# Patient Record
Sex: MALE | Race: WHITE | Employment: OTHER | URBAN - METROPOLITAN AREA
[De-identification: names, ages, dates, MRNs, and addresses within clinical notes are randomized per-mention and may not be internally consistent; named-entity substitution may affect disease eponyms.]

---

## 2020-04-21 ENCOUNTER — TRANSFERRED RECORDS (OUTPATIENT)
Dept: HEALTH INFORMATION MANAGEMENT | Facility: CLINIC | Age: 70
End: 2020-04-21

## 2020-04-21 LAB — EJECTION FRACTION: 10 %

## 2020-04-28 ENCOUNTER — TRANSFERRED RECORDS (OUTPATIENT)
Dept: HEALTH INFORMATION MANAGEMENT | Facility: CLINIC | Age: 70
End: 2020-04-28

## 2020-05-05 ENCOUNTER — HOSPITAL ENCOUNTER (INPATIENT)
Facility: CLINIC | Age: 70
LOS: 14 days | Discharge: HOME HEALTH | DRG: 234 | End: 2020-05-19
Attending: THORACIC SURGERY (CARDIOTHORACIC VASCULAR SURGERY) | Admitting: INTERNAL MEDICINE
Payer: MEDICARE

## 2020-05-05 DIAGNOSIS — I25.10 CAD (CORONARY ARTERY DISEASE): ICD-10-CM

## 2020-05-05 DIAGNOSIS — I25.10 CORONARY ARTERY DISEASE INVOLVING NATIVE CORONARY ARTERY OF NATIVE HEART WITHOUT ANGINA PECTORIS: ICD-10-CM

## 2020-05-05 DIAGNOSIS — Z95.1 S/P THREE VESSEL CORONARY ARTERY BYPASS: Primary | ICD-10-CM

## 2020-05-05 DIAGNOSIS — I25.10 3-VESSEL CORONARY ARTERY DISEASE: ICD-10-CM

## 2020-05-05 LAB
ALBUMIN SERPL-MCNC: 2.9 G/DL (ref 3.4–5)
ALP SERPL-CCNC: 40 U/L (ref 40–150)
ALT SERPL W P-5'-P-CCNC: 27 U/L (ref 0–70)
ANION GAP SERPL CALCULATED.3IONS-SCNC: 5 MMOL/L (ref 3–14)
AST SERPL W P-5'-P-CCNC: 19 U/L (ref 0–45)
BASOPHILS # BLD AUTO: 0 10E9/L (ref 0–0.2)
BASOPHILS NFR BLD AUTO: 0.3 %
BILIRUB SERPL-MCNC: 0.6 MG/DL (ref 0.2–1.3)
BUN SERPL-MCNC: 15 MG/DL (ref 7–30)
CALCIUM SERPL-MCNC: 9.3 MG/DL (ref 8.5–10.1)
CHLORIDE SERPL-SCNC: 102 MMOL/L (ref 94–109)
CO2 SERPL-SCNC: 31 MMOL/L (ref 20–32)
CREAT SERPL-MCNC: 0.82 MG/DL (ref 0.66–1.25)
DIFFERENTIAL METHOD BLD: ABNORMAL
EOSINOPHIL # BLD AUTO: 0.8 10E9/L (ref 0–0.7)
EOSINOPHIL NFR BLD AUTO: 8.5 %
ERYTHROCYTE [DISTWIDTH] IN BLOOD BY AUTOMATED COUNT: 14.2 % (ref 10–15)
ERYTHROCYTE [DISTWIDTH] IN BLOOD BY AUTOMATED COUNT: 14.3 % (ref 10–15)
GFR SERPL CREATININE-BSD FRML MDRD: >90 ML/MIN/{1.73_M2}
GLUCOSE SERPL-MCNC: 121 MG/DL (ref 70–99)
HBA1C MFR BLD: 5.8 % (ref 0–5.6)
HCT VFR BLD AUTO: 42 % (ref 40–53)
HCT VFR BLD AUTO: 43.4 % (ref 40–53)
HGB BLD-MCNC: 12.8 G/DL (ref 13.3–17.7)
HGB BLD-MCNC: 12.8 G/DL (ref 13.3–17.7)
IMM GRANULOCYTES # BLD: 0 10E9/L (ref 0–0.4)
IMM GRANULOCYTES NFR BLD: 0.4 %
LYMPHOCYTES # BLD AUTO: 1.5 10E9/L (ref 0.8–5.3)
LYMPHOCYTES NFR BLD AUTO: 15.7 %
MAGNESIUM SERPL-MCNC: 1.9 MG/DL (ref 1.6–2.3)
MCH RBC QN AUTO: 28.5 PG (ref 26.5–33)
MCH RBC QN AUTO: 29.1 PG (ref 26.5–33)
MCHC RBC AUTO-ENTMCNC: 29.5 G/DL (ref 31.5–36.5)
MCHC RBC AUTO-ENTMCNC: 30.5 G/DL (ref 31.5–36.5)
MCV RBC AUTO: 96 FL (ref 78–100)
MCV RBC AUTO: 97 FL (ref 78–100)
MONOCYTES # BLD AUTO: 1 10E9/L (ref 0–1.3)
MONOCYTES NFR BLD AUTO: 10.6 %
NEUTROPHILS # BLD AUTO: 6.2 10E9/L (ref 1.6–8.3)
NEUTROPHILS NFR BLD AUTO: 64.5 %
NRBC # BLD AUTO: 0 10*3/UL
NRBC BLD AUTO-RTO: 0 /100
PLATELET # BLD AUTO: 244 10E9/L (ref 150–450)
PLATELET # BLD AUTO: 247 10E9/L (ref 150–450)
POTASSIUM SERPL-SCNC: 4.8 MMOL/L (ref 3.4–5.3)
PROT SERPL-MCNC: 7.2 G/DL (ref 6.8–8.8)
RBC # BLD AUTO: 4.4 10E12/L (ref 4.4–5.9)
RBC # BLD AUTO: 4.49 10E12/L (ref 4.4–5.9)
SODIUM SERPL-SCNC: 138 MMOL/L (ref 133–144)
WBC # BLD AUTO: 9.1 10E9/L (ref 4–11)
WBC # BLD AUTO: 9.6 10E9/L (ref 4–11)

## 2020-05-05 PROCEDURE — 25000128 H RX IP 250 OP 636: Performed by: STUDENT IN AN ORGANIZED HEALTH CARE EDUCATION/TRAINING PROGRAM

## 2020-05-05 PROCEDURE — 21400000 ZZH R&B CCU UMMC

## 2020-05-05 PROCEDURE — 36415 COLL VENOUS BLD VENIPUNCTURE: CPT | Performed by: STUDENT IN AN ORGANIZED HEALTH CARE EDUCATION/TRAINING PROGRAM

## 2020-05-05 PROCEDURE — 83036 HEMOGLOBIN GLYCOSYLATED A1C: CPT | Performed by: STUDENT IN AN ORGANIZED HEALTH CARE EDUCATION/TRAINING PROGRAM

## 2020-05-05 PROCEDURE — 99222 1ST HOSP IP/OBS MODERATE 55: CPT | Mod: AI | Performed by: INTERNAL MEDICINE

## 2020-05-05 PROCEDURE — 80053 COMPREHEN METABOLIC PANEL: CPT | Performed by: STUDENT IN AN ORGANIZED HEALTH CARE EDUCATION/TRAINING PROGRAM

## 2020-05-05 PROCEDURE — 85027 COMPLETE CBC AUTOMATED: CPT | Performed by: STUDENT IN AN ORGANIZED HEALTH CARE EDUCATION/TRAINING PROGRAM

## 2020-05-05 PROCEDURE — 83735 ASSAY OF MAGNESIUM: CPT | Performed by: STUDENT IN AN ORGANIZED HEALTH CARE EDUCATION/TRAINING PROGRAM

## 2020-05-05 PROCEDURE — 85025 COMPLETE CBC W/AUTO DIFF WBC: CPT | Performed by: STUDENT IN AN ORGANIZED HEALTH CARE EDUCATION/TRAINING PROGRAM

## 2020-05-05 PROCEDURE — 87040 BLOOD CULTURE FOR BACTERIA: CPT | Performed by: STUDENT IN AN ORGANIZED HEALTH CARE EDUCATION/TRAINING PROGRAM

## 2020-05-05 PROCEDURE — 25000132 ZZH RX MED GY IP 250 OP 250 PS 637: Mod: GY | Performed by: STUDENT IN AN ORGANIZED HEALTH CARE EDUCATION/TRAINING PROGRAM

## 2020-05-05 PROCEDURE — 80061 LIPID PANEL: CPT | Performed by: STUDENT IN AN ORGANIZED HEALTH CARE EDUCATION/TRAINING PROGRAM

## 2020-05-05 RX ORDER — ALUMINA, MAGNESIA, AND SIMETHICONE 2400; 2400; 240 MG/30ML; MG/30ML; MG/30ML
30 SUSPENSION ORAL EVERY 4 HOURS PRN
Status: DISCONTINUED | OUTPATIENT
Start: 2020-05-05 | End: 2020-05-19 | Stop reason: HOSPADM

## 2020-05-05 RX ORDER — NITROGLYCERIN 0.4 MG/1
0.4 TABLET SUBLINGUAL EVERY 5 MIN PRN
Status: DISCONTINUED | OUTPATIENT
Start: 2020-05-05 | End: 2020-05-19 | Stop reason: HOSPADM

## 2020-05-05 RX ORDER — ACETAMINOPHEN 650 MG/1
650 SUPPOSITORY RECTAL EVERY 4 HOURS PRN
Status: DISCONTINUED | OUTPATIENT
Start: 2020-05-05 | End: 2020-05-05

## 2020-05-05 RX ORDER — DIGOXIN 125 MCG
125 TABLET ORAL DAILY
Status: DISCONTINUED | OUTPATIENT
Start: 2020-05-06 | End: 2020-05-19 | Stop reason: HOSPADM

## 2020-05-05 RX ORDER — LIDOCAINE 40 MG/G
CREAM TOPICAL
Status: DISCONTINUED | OUTPATIENT
Start: 2020-05-05 | End: 2020-05-19

## 2020-05-05 RX ORDER — ACETAMINOPHEN 325 MG/1
650 TABLET ORAL EVERY 4 HOURS PRN
Status: DISCONTINUED | OUTPATIENT
Start: 2020-05-05 | End: 2020-05-08

## 2020-05-05 RX ORDER — ASPIRIN 81 MG/1
81 TABLET, CHEWABLE ORAL DAILY
Status: DISCONTINUED | OUTPATIENT
Start: 2020-05-06 | End: 2020-05-08

## 2020-05-05 RX ORDER — LOSARTAN POTASSIUM 100 MG/1
100 TABLET ORAL DAILY
Status: DISCONTINUED | OUTPATIENT
Start: 2020-05-06 | End: 2020-05-08

## 2020-05-05 RX ORDER — PANTOPRAZOLE SODIUM 40 MG/1
40 TABLET, DELAYED RELEASE ORAL
Status: DISCONTINUED | OUTPATIENT
Start: 2020-05-06 | End: 2020-05-08

## 2020-05-05 RX ORDER — ACETAMINOPHEN 325 MG/1
325 TABLET ORAL EVERY 4 HOURS PRN
Status: DISCONTINUED | OUTPATIENT
Start: 2020-05-05 | End: 2020-05-05

## 2020-05-05 RX ORDER — HEPARIN SODIUM 10000 [USP'U]/100ML
0-3500 INJECTION, SOLUTION INTRAVENOUS CONTINUOUS
Status: DISCONTINUED | OUTPATIENT
Start: 2020-05-05 | End: 2020-05-09 | Stop reason: CLARIF

## 2020-05-05 RX ORDER — SPIRONOLACTONE 25 MG/1
25 TABLET ORAL DAILY
Status: DISCONTINUED | OUTPATIENT
Start: 2020-05-06 | End: 2020-05-08

## 2020-05-05 RX ORDER — ASPIRIN 81 MG/1
324 TABLET, CHEWABLE ORAL ONCE
Status: DISCONTINUED | OUTPATIENT
Start: 2020-05-05 | End: 2020-05-05

## 2020-05-05 RX ORDER — ATORVASTATIN CALCIUM 40 MG/1
40 TABLET, FILM COATED ORAL EVERY EVENING
Status: DISCONTINUED | OUTPATIENT
Start: 2020-05-06 | End: 2020-05-08

## 2020-05-05 RX ORDER — CARVEDILOL 3.12 MG/1
6.25 TABLET ORAL 2 TIMES DAILY WITH MEALS
Status: DISCONTINUED | OUTPATIENT
Start: 2020-05-05 | End: 2020-05-08

## 2020-05-05 RX ADMIN — HEPARIN SODIUM 1200 UNITS/HR: 10000 INJECTION, SOLUTION INTRAVENOUS at 21:24

## 2020-05-05 RX ADMIN — CARVEDILOL 6.25 MG: 6.25 TABLET, FILM COATED ORAL at 20:27

## 2020-05-05 ASSESSMENT — ACTIVITIES OF DAILY LIVING (ADL)
FALL_HISTORY_WITHIN_LAST_SIX_MONTHS: NO
DRESS: 0-->INDEPENDENT
SWALLOWING: 0-->SWALLOWS FOODS/LIQUIDS WITHOUT DIFFICULTY
COGNITION: 0 - NO COGNITION ISSUES REPORTED
TOILETING: 0-->INDEPENDENT
ADLS_ACUITY_SCORE: 10
TRANSFERRING: 0-->INDEPENDENT
BATHING: 0-->INDEPENDENT
AMBULATION: 0-->INDEPENDENT
RETIRED_EATING: 0-->INDEPENDENT
RETIRED_COMMUNICATION: 0-->UNDERSTANDS/COMMUNICATES WITHOUT DIFFICULTY

## 2020-05-05 ASSESSMENT — MIFFLIN-ST. JEOR: SCORE: 2076.71

## 2020-05-05 NOTE — LETTER
Transition Communication Hand-off for Care Transitions to Next Level of Care Provider    Name: Rudi Schilling  : 1950  MRN #: [id number]  Primary Care Provider: Physician No Ref-Primary     Primary Clinic: No address on file     Reason for Hospitalization:  Multi vessel CAD, may need CABG  CAD (coronary artery disease)  CAD (coronary artery disease)  Admit Date/Time: 2020  4:45 PM  Discharge Date: 2020  Payor Source: Payor: MEDICARE / Plan: MEDICARE / Product Type: Medicare   Readmission Assessment Measure (MANUELA) Risk Score/category: average.   Reason for Communication Hand-off Referral: Multiple providers/specialties  Discharge Plan:  Discharge Needs Assessment:  Needs      Most Recent Value   Equipment Currently Used at Home  none   Other Resources  Other (see comment)   Other  -- [phone] [facility]]      Follow-up specialty is recommended: Yes    Follow-up plan:    Future Appointments   Date Time Provider Department Center   2020  2:00 PM [facility]       Any outstanding tests or procedures:        Referrals     Future Labs/Procedures    Cardiac Rehab Referral     Process Instructions:    Advance to Wellness and Exercise for Life (WEL) Program or to another maintenance exercise program upon completion of phase 2 cardiac rehab.    Weight mgt program is only offered at [facility].    *This therapy referral will be filtered to a centralized scheduling office at [facility] and the patient will receive a call to schedule an appointment at a [facility] location most convenient for them. *        Comments:    If you have not heard from the scheduling office within 2 business days, please call [phone] for all locations, with the exception of [place], please call [phone] and [place], please call [phone].    Please be aware that coverage of these services is subject to the terms and limitations of your health insurance plan.   Call member services at your health plan with any benefit or coverage questions.      Home Care OT Referral for Hospital Discharge     Comments:    [facility]   Phone: [phone]  Fax: [phone]      OT to eval and treat    Your provider has ordered home care - occupational therapy. If you have not been contacted within 2 days of your discharge please call the department phone number listed on the top of this document.    Home Care PT Referral for Hospital Discharge     Comments:    [facility]   Phone: [phone]   Fax: [phone]     Temporary Address: [place] [address]    For PT eval and treat for deconditioning, strengthening and endurance.       Your provider has ordered home care - physical therapy. If you have not been contacted within 2 days of your discharge please call the department phone number listed on the top of this document.    ______________________________________________________________  Comments: Documentation of Face to Face and Certification for Home Health Services     I certify that patient: Rudi Schilling is under my care and that I, or a nurse practitioner or physician's assistant working with me, had a face-to-face encounter that meets the physician face-to-face encounter requirements with this patient on: 5/15/2020.     This encounter with the patient was in whole, or in part, for the following medical condition, which is the primary reason for home health care: S/P CABG.     I certify that, based on my findings, the following services are medically necessary home health services: Occupational Therapy and Physical Therapy.     My clinical findings support the need for the above services because: Occupational Therapy Services are needed to assess and treat cognitive ability and address ADL safety due to impairment and Physical Therapy Services are needed to assess and treat the following functional impairments: deconditioning,  strengthening and endurance.     Further, I certify that my clinical findings support that this patient is homebound (i.e. absences from home require considerable and taxing effort and are for medical reasons or [facility] services or infrequently or of short duration when for other reasons) because: Requires assistance of another person or specialized equipment to access medical services because patient: Is unable to exit home safely on own.     Based on the above findings. I certify that this patient is confined to the home and needs intermittent skilled nursing care, physical therapy and/or speech therapy.  The patient is under my care, and I have initiated the establishment of the plan of care.  This patient will be followed by a physician who will periodically review the plan of care.   Physician/Provider to provide follow up care: No Ref-Primary, Physician     Attending hospital physician (the Medicare certified ERNESTO provider): Yobany Holloway MD   Physician Signature: See electronic signature associated with these discharge orders.   Date: 5/15/2020          Supplies     Future Labs/Procedures    Walker Order     Process Instructions:    By signing this order, the Authorizing Provider is attesting that they have completed a face-to-face evaluation on the patient to determine their need for this equipment in the last 60 days. A new face-to-face evaluation is required each time  A new prescription for on eo f the specified items is ordered.   If an additional provider completed the evaluation, please indicate their name below.     **As of 2018, an order requisition and face sheet will print for all DME orders. Please give printed order and face sheet to patient if not obtaining product from [facility] DME closet.     Comments:    DME Documentation:   Describe the reason for need to support medical necessity: gait instability.     I, the undersigned, certify that the above prescribed supplies are  medically necessary for this patient and is both reasonable and necessary in reference to accepted standards of medical and necessary in reference to accepted standards of medical practice in the treatment of this patient's condition and is not prescribed as a convenience.          Key Recommendations:  Post hospitalization follow up.     CHANDAN DE LA PAZ RN BSN  Care Coordinator Unit [address] [place] [address]  Phone: [phone]

## 2020-05-05 NOTE — Clinical Note
dry, intact, no bleeding and no hematoma. 7fr sheath RIJ sutured in place swan -harmony @62 cm, 9fr sheath RFA sutured in place. IABP placed in 9fr sheath

## 2020-05-06 ENCOUNTER — APPOINTMENT (OUTPATIENT)
Dept: CT IMAGING | Facility: CLINIC | Age: 70
DRG: 234 | End: 2020-05-06
Attending: PHYSICIAN ASSISTANT
Payer: MEDICARE

## 2020-05-06 ENCOUNTER — APPOINTMENT (OUTPATIENT)
Dept: ULTRASOUND IMAGING | Facility: CLINIC | Age: 70
DRG: 234 | End: 2020-05-06
Attending: PHYSICIAN ASSISTANT
Payer: MEDICARE

## 2020-05-06 ENCOUNTER — APPOINTMENT (OUTPATIENT)
Dept: CARDIOLOGY | Facility: CLINIC | Age: 70
DRG: 234 | End: 2020-05-06
Attending: STUDENT IN AN ORGANIZED HEALTH CARE EDUCATION/TRAINING PROGRAM
Payer: MEDICARE

## 2020-05-06 ENCOUNTER — PREP FOR PROCEDURE (OUTPATIENT)
Dept: CARDIOLOGY | Facility: CLINIC | Age: 70
End: 2020-05-06

## 2020-05-06 DIAGNOSIS — I25.10 CAD (CORONARY ARTERY DISEASE): Primary | ICD-10-CM

## 2020-05-06 LAB
ALBUMIN SERPL-MCNC: 2.8 G/DL (ref 3.4–5)
ALBUMIN UR-MCNC: NEGATIVE MG/DL
ALP SERPL-CCNC: 41 U/L (ref 40–150)
ALT SERPL W P-5'-P-CCNC: 28 U/L (ref 0–70)
ANION GAP SERPL CALCULATED.3IONS-SCNC: 3 MMOL/L (ref 3–14)
APPEARANCE UR: CLEAR
AST SERPL W P-5'-P-CCNC: 19 U/L (ref 0–45)
BILIRUB DIRECT SERPL-MCNC: 0.2 MG/DL (ref 0–0.2)
BILIRUB SERPL-MCNC: 0.6 MG/DL (ref 0.2–1.3)
BILIRUB UR QL STRIP: NEGATIVE
BUN SERPL-MCNC: 16 MG/DL (ref 7–30)
CALCIUM SERPL-MCNC: 9.3 MG/DL (ref 8.5–10.1)
CHLORIDE SERPL-SCNC: 103 MMOL/L (ref 94–109)
CHOLEST SERPL-MCNC: 133 MG/DL
CO2 SERPL-SCNC: 31 MMOL/L (ref 20–32)
COLOR UR AUTO: YELLOW
CREAT SERPL-MCNC: 1 MG/DL (ref 0.66–1.25)
ERYTHROCYTE [DISTWIDTH] IN BLOOD BY AUTOMATED COUNT: 14.3 % (ref 10–15)
GFR SERPL CREATININE-BSD FRML MDRD: 77 ML/MIN/{1.73_M2}
GLUCOSE SERPL-MCNC: 108 MG/DL (ref 70–99)
GLUCOSE UR STRIP-MCNC: NEGATIVE MG/DL
HCT VFR BLD AUTO: 41.5 % (ref 40–53)
HDLC SERPL-MCNC: 44 MG/DL
HGB BLD-MCNC: 12.2 G/DL (ref 13.3–17.7)
HGB UR QL STRIP: ABNORMAL
INR PPP: 1.13 (ref 0.86–1.14)
KETONES UR STRIP-MCNC: NEGATIVE MG/DL
LDLC SERPL CALC-MCNC: 72 MG/DL
LEUKOCYTE ESTERASE UR QL STRIP: ABNORMAL
LMWH PPP CHRO-ACNC: 0.45 IU/ML
LMWH PPP CHRO-ACNC: <0.1 IU/ML
LMWH PPP CHRO-ACNC: <0.1 IU/ML
MAGNESIUM SERPL-MCNC: 2.1 MG/DL (ref 1.6–2.3)
MCH RBC QN AUTO: 28.7 PG (ref 26.5–33)
MCHC RBC AUTO-ENTMCNC: 29.4 G/DL (ref 31.5–36.5)
MCV RBC AUTO: 98 FL (ref 78–100)
NITRATE UR QL: NEGATIVE
NONHDLC SERPL-MCNC: 89 MG/DL
PH UR STRIP: 6.5 PH (ref 5–7)
PHOSPHATE SERPL-MCNC: 3.6 MG/DL (ref 2.5–4.5)
PLATELET # BLD AUTO: 278 10E9/L (ref 150–450)
POTASSIUM SERPL-SCNC: 4.8 MMOL/L (ref 3.4–5.3)
PROT SERPL-MCNC: 7.2 G/DL (ref 6.8–8.8)
RBC # BLD AUTO: 4.25 10E12/L (ref 4.4–5.9)
RBC #/AREA URNS AUTO: 18 /HPF (ref 0–2)
SARS-COV-2 PCR COMMENT: NORMAL
SARS-COV-2 RNA SPEC QL NAA+PROBE: NEGATIVE
SARS-COV-2 RNA SPEC QL NAA+PROBE: NORMAL
SODIUM SERPL-SCNC: 136 MMOL/L (ref 133–144)
SOURCE: ABNORMAL
SP GR UR STRIP: 1.01 (ref 1–1.03)
SPECIMEN SOURCE: NORMAL
SPECIMEN SOURCE: NORMAL
SQUAMOUS #/AREA URNS AUTO: <1 /HPF (ref 0–1)
TRIGL SERPL-MCNC: 88 MG/DL
UROBILINOGEN UR STRIP-MCNC: NORMAL MG/DL (ref 0–2)
WBC # BLD AUTO: 8.4 10E9/L (ref 4–11)
WBC #/AREA URNS AUTO: 4 /HPF (ref 0–5)

## 2020-05-06 PROCEDURE — 25000132 ZZH RX MED GY IP 250 OP 250 PS 637: Mod: GY | Performed by: STUDENT IN AN ORGANIZED HEALTH CARE EDUCATION/TRAINING PROGRAM

## 2020-05-06 PROCEDURE — 40000358 ZZHCL STATISTIC DRUG SCREEN MULTIPLE (METRO): Performed by: STUDENT IN AN ORGANIZED HEALTH CARE EDUCATION/TRAINING PROGRAM

## 2020-05-06 PROCEDURE — 25000125 ZZHC RX 250: Performed by: PHYSICIAN ASSISTANT

## 2020-05-06 PROCEDURE — 21400000 ZZH R&B CCU UMMC

## 2020-05-06 PROCEDURE — 93970 EXTREMITY STUDY: CPT

## 2020-05-06 PROCEDURE — 93880 EXTRACRANIAL BILAT STUDY: CPT

## 2020-05-06 PROCEDURE — 80076 HEPATIC FUNCTION PANEL: CPT | Performed by: STUDENT IN AN ORGANIZED HEALTH CARE EDUCATION/TRAINING PROGRAM

## 2020-05-06 PROCEDURE — 25500064 ZZH RX 255 OP 636: Performed by: INTERNAL MEDICINE

## 2020-05-06 PROCEDURE — 40000556 ZZH STATISTIC PERIPHERAL IV START W US GUIDANCE

## 2020-05-06 PROCEDURE — 80307 DRUG TEST PRSMV CHEM ANLYZR: CPT | Performed by: STUDENT IN AN ORGANIZED HEALTH CARE EDUCATION/TRAINING PROGRAM

## 2020-05-06 PROCEDURE — 25000128 H RX IP 250 OP 636: Performed by: STUDENT IN AN ORGANIZED HEALTH CARE EDUCATION/TRAINING PROGRAM

## 2020-05-06 PROCEDURE — 85027 COMPLETE CBC AUTOMATED: CPT | Performed by: STUDENT IN AN ORGANIZED HEALTH CARE EDUCATION/TRAINING PROGRAM

## 2020-05-06 PROCEDURE — 86901 BLOOD TYPING SEROLOGIC RH(D): CPT | Performed by: PHYSICIAN ASSISTANT

## 2020-05-06 PROCEDURE — 36415 COLL VENOUS BLD VENIPUNCTURE: CPT | Performed by: INTERNAL MEDICINE

## 2020-05-06 PROCEDURE — 87635 SARS-COV-2 COVID-19 AMP PRB: CPT | Performed by: STUDENT IN AN ORGANIZED HEALTH CARE EDUCATION/TRAINING PROGRAM

## 2020-05-06 PROCEDURE — 86850 RBC ANTIBODY SCREEN: CPT | Performed by: PHYSICIAN ASSISTANT

## 2020-05-06 PROCEDURE — 84100 ASSAY OF PHOSPHORUS: CPT | Performed by: STUDENT IN AN ORGANIZED HEALTH CARE EDUCATION/TRAINING PROGRAM

## 2020-05-06 PROCEDURE — 85520 HEPARIN ASSAY: CPT | Performed by: STUDENT IN AN ORGANIZED HEALTH CARE EDUCATION/TRAINING PROGRAM

## 2020-05-06 PROCEDURE — 85520 HEPARIN ASSAY: CPT | Performed by: INTERNAL MEDICINE

## 2020-05-06 PROCEDURE — 86923 COMPATIBILITY TEST ELECTRIC: CPT | Performed by: PHYSICIAN ASSISTANT

## 2020-05-06 PROCEDURE — 99233 SBSQ HOSP IP/OBS HIGH 50: CPT | Mod: 25 | Performed by: INTERNAL MEDICINE

## 2020-05-06 PROCEDURE — 83735 ASSAY OF MAGNESIUM: CPT | Performed by: STUDENT IN AN ORGANIZED HEALTH CARE EDUCATION/TRAINING PROGRAM

## 2020-05-06 PROCEDURE — 93306 TTE W/DOPPLER COMPLETE: CPT | Mod: 26 | Performed by: INTERNAL MEDICINE

## 2020-05-06 PROCEDURE — 36415 COLL VENOUS BLD VENIPUNCTURE: CPT | Performed by: STUDENT IN AN ORGANIZED HEALTH CARE EDUCATION/TRAINING PROGRAM

## 2020-05-06 PROCEDURE — 86900 BLOOD TYPING SEROLOGIC ABO: CPT | Performed by: PHYSICIAN ASSISTANT

## 2020-05-06 PROCEDURE — 80048 BASIC METABOLIC PNL TOTAL CA: CPT | Performed by: STUDENT IN AN ORGANIZED HEALTH CARE EDUCATION/TRAINING PROGRAM

## 2020-05-06 PROCEDURE — 85610 PROTHROMBIN TIME: CPT | Performed by: STUDENT IN AN ORGANIZED HEALTH CARE EDUCATION/TRAINING PROGRAM

## 2020-05-06 PROCEDURE — 81001 URINALYSIS AUTO W/SCOPE: CPT | Performed by: PHYSICIAN ASSISTANT

## 2020-05-06 PROCEDURE — 40000264 ECHOCARDIOGRAM COMPLETE

## 2020-05-06 PROCEDURE — 71250 CT THORAX DX C-: CPT

## 2020-05-06 RX ORDER — FUROSEMIDE 40 MG
40 TABLET ORAL DAILY
Status: DISCONTINUED | OUTPATIENT
Start: 2020-05-06 | End: 2020-05-12

## 2020-05-06 RX ORDER — MUPIROCIN 20 MG/G
1 OINTMENT TOPICAL 2 TIMES DAILY
Status: COMPLETED | OUTPATIENT
Start: 2020-05-06 | End: 2020-05-07

## 2020-05-06 RX ADMIN — MUPIROCIN 1 G: 20 OINTMENT TOPICAL at 20:54

## 2020-05-06 RX ADMIN — HUMAN ALBUMIN MICROSPHERES AND PERFLUTREN 9 ML: 10; .22 INJECTION, SOLUTION INTRAVENOUS at 15:30

## 2020-05-06 RX ADMIN — LOSARTAN POTASSIUM 100 MG: 100 TABLET, FILM COATED ORAL at 08:01

## 2020-05-06 RX ADMIN — HEPARIN SODIUM 1800 UNITS/HR: 10000 INJECTION, SOLUTION INTRAVENOUS at 20:37

## 2020-05-06 RX ADMIN — ATORVASTATIN CALCIUM 40 MG: 40 TABLET, FILM COATED ORAL at 20:54

## 2020-05-06 RX ADMIN — SPIRONOLACTONE 25 MG: 25 TABLET ORAL at 08:01

## 2020-05-06 RX ADMIN — PANTOPRAZOLE SODIUM 40 MG: 40 TABLET, DELAYED RELEASE ORAL at 08:01

## 2020-05-06 RX ADMIN — FUROSEMIDE 40 MG: 40 TABLET ORAL at 12:53

## 2020-05-06 RX ADMIN — HEPARIN SODIUM 1500 UNITS/HR: 10000 INJECTION, SOLUTION INTRAVENOUS at 09:27

## 2020-05-06 RX ADMIN — ASPIRIN 81 MG CHEWABLE TABLET 81 MG: 81 TABLET CHEWABLE at 08:01

## 2020-05-06 RX ADMIN — CARVEDILOL 6.25 MG: 6.25 TABLET, FILM COATED ORAL at 08:01

## 2020-05-06 RX ADMIN — DIGOXIN 125 MCG: 0.06 TABLET ORAL at 08:01

## 2020-05-06 RX ADMIN — CARVEDILOL 6.25 MG: 6.25 TABLET, FILM COATED ORAL at 18:26

## 2020-05-06 ASSESSMENT — ACTIVITIES OF DAILY LIVING (ADL)
ADLS_ACUITY_SCORE: 12

## 2020-05-06 ASSESSMENT — MIFFLIN-ST. JEOR: SCORE: 2071.27

## 2020-05-06 NOTE — PLAN OF CARE
D: Transferred from an OSH on 5/5/2020 for further management and evaluation for possible revascularization    PMHx morbid obesity and recently diagnosed atrial fibrillation, multivessel CAD and biventricular HFrEF (EF 10%)     I: Monitored vitals and assessed pt status.   Changed: - Heparin gtt started  - NPO since MN  Running: Heparin at 1500 u/hr Xa recheck at 1130  Tele: afib  O2: RA  Mobility: SBA/indep     A: A0x4. VSS. HR 's. Afebrile. Minimal edema. Some MIMS. Urinating adequately but need frequent reinforcement on use of urinal to measure output. Several BMs overnight. Denies loose stool and abdominal discomfort. Skin intact. Yeasty groin/pannus - nystatin requested. Denies pain/N/V/SOB/dizziness. R PIV. Able to make needs known.      P: Continue to monitor pt status and report changes to cards 2. CVTS consulted.

## 2020-05-06 NOTE — PROGRESS NOTES
Cardiology Progress Note  5/6/2020      ASSESSMENT/PLAN:  Rudi Schilling is a 70 year old male with a PMH significant for morbid obesity and recently diagnosed atrial fibrillation, multivessel CAD and biventricular HFrEF (EF 10% on 4/21/2020 TTE) who was transferred from an OSH on 5/5/2020 for further management and evaluation for possible revascularization.    Changes Today:  - CVTS consult  - Will attempt to get Coronary Angiogram pushed to our system  - Repeat Echo today  - Start maintenance lasix 40mg PO qday  - Continue heparin gtt  - Follow up blood cultures  - Asymptomatic COVID screening per pre-op requirements    # Severe biventricular HFrEF, NYHA CLASS II, CHF STAGE D  # ICM with multivessel CAD  Most recent echo on 4/21/2020 showed EF of 10%, and coronary angiogram on 4/28/2020 revealed multivessel CAD.  Transferred to [facility] for further management and evaluation for revascularization. Patient denies any complaints at this time. He reports mild symptoms of SOB with exertion but denies chest pain or palpitations. He appears euvolemic on exam.  - ACEI/ARB: losartan 100mg daily  - Beta Blocker: 6.25mg BID  - Aldosterone Antagonist: spironolactone 25mg daily  - Diuresis: Appears euvolemic, will start maintenance PO lasix 40mg daily  - Telemetry  - Strict Is/Os, daily weights, cardiac diet, fluid restriction (<1.5L daily)  - CVTS consulted, appreciate recs   - Plan for OR on Friday for CABG, will need IABP placed prior to procedure  - Plan to push coronary angiogram images to our system  - Repeat TTE     # atrial fibrillation  CHADSVASC of 3 (age, vascular disease, and CHF).  Rates of 90s on admission.  - telemetry  - continue carvedilol 6.25mg BID and digoxin 125mcg daily  - heparin gtt for anticoagulation, holding PTA elliquis     # Positive BCx w/ staph hominis, suspect contamination  Patient was noted to spike a fever on 4/29 at the OSH and was started on vancomycin/ceftriaxone after 1 of 2 blood cultures  "initially grew GPCs which eventually grew out staph hominis. Repeat BCx of  were negative for >24hours at time of discharge per chart review and antibiotics were discontinued   - repeat blood cultures at our facility NGTD   - will hold off on antibiotics at this time and monitor for fevers     CHRONIC MEDICAL PROBLEMS  # Chronic back pain: continue PTA cyclobenzaprine PRN  # GERD: continue PTA pantoprazole    FEN:Cardiac Diet  Ppx: Heparin gtt  Code: Full  Dispo: Admit to cardiology    Plan d/w Dr. Odin M.D., who is in agreement. Please, see staff addendum for changes/additions to the plan.    Will Calloway MD  Internal Medicine PGY3  [phone]      ===================================================================    SUBJECTIVE: CATHERINE o/n. VSS. RN notes reviewed. Feels well this morning. No complaints. Denies chest pain, shortness of breath, palpitations    Nursing notes reviewed.    OBJECTIVE:  BP (!) 122/96 (BP Location: Left arm)   Pulse 108   Temp 98.6  F (37  C) (Oral)   Resp 16   Ht 1.778 m (5' 10\")   Wt 129.5 kg (285 lb 8 oz)   SpO2 92%   BMI 40.96 kg/m    Temp (24hrs), Av.6  F (37  C), Min:98.2  F (36.8  C), Max:98.9  F (37.2  C)      I/O:    Intake/Output Summary (Last 24 hours) at 2020 0815  Last data filed at 2020 0700  Gross per 24 hour   Intake 720.05 ml   Output 550 ml   Net 170.05 ml       Wt:   Wt Readings from Last 5 Encounters:   20 129.5 kg (285 lb 8 oz)       GEN: pleasant, no acute distress  HEENT: no icterus  CV: RRR, normal s1/s2, no murmurs/rubs/s3/s4, no heave. Unable to assess JVP.   CHEST: clear to ausculation bilaterally, no rales or wheezing  ABD: soft, non-tender, normal active bowel sounds  EXTR: Trace edema bilaterally  NEURO: alert oriented, speech fluent/appropriate, motor grossly nonfocal    Labs: reviewed in EMR  CBC  Recent Labs   Lab 20  0407 20  2134 20  1823   WBC 8.4 9.1 9.6   RBC 4.25* 4.49 4.40   HGB 12.2* 12.8* 12.8* "   HCT 41.5 43.4 42.0   MCV 98 97 96   MCH 28.7 28.5 29.1   MCHC 29.4* 29.5* 30.5*   RDW 14.3 14.2 14.3    244 247     BMP  Recent Labs   Lab 05/06/20  0407 05/05/20  1823    138   POTASSIUM 4.8 4.8   CHLORIDE 103 102   CO2 31 31   ANIONGAP 3 5   * 121*   BUN 16 15   CR 1.00 0.82   GFRESTIMATED 77 >90   GFRESTBLACK 89 >90   SIDRA 9.3 9.3   MAG 2.1 1.9   PHOS 3.6  --      Troponins:   No results found for: TROPI, TROPONIN, TROPR, TROPN  INR  Recent Labs   Lab 05/06/20  0407   INR 1.13       Imaging: reviewed in EMR.      I have reviewed today's vital signs, notes, medications, labs and imaging.  I have also seen and examined the patient and agree with the findings and plan as outlined above.  Pt without complaints.  VSS with T 98.6,  and RR 16 with /96. Lungs clear and distant heart sounds. Trace pedal edema.  Labs wwith Cr 1 and WBC 8.4.  Assessment: Pt with 3V dz and viability in all regions with severely depressed LV fn.  Will continue aldactone, dig, coreg and ASA with losartan and check COVID-19 status and have LVAD education.     Tapan Newby MD, PhD  Professor, Heart Failure and Cardiac Transplantation  [facility]

## 2020-05-06 NOTE — H&P
[facility], [place]    History and Physical - Cardiology Night Float Service   Patient Name: Rudi Schilling   Date of Admission:  5/5/2020    Assessment & Plan   Rudi Schilling is a 70 year old male with a PMH significant for morbid obesity and recently diagnosed atrial fibrillation, multivessel CAD and biventricular HFrEF (EF 10% on 4/21/2020 TTE) who was transferred from an OSH on 5/5/2020 for further management and evaluation for possible revascularization.      # severe biventricular HFrEF, NYHA CLASS II, CHF STAGE D  # ICM with multivessel CAD  Most recent echo on 4/21/2020 showed EF of 10%, and coronary angiogram on 4/28/2020 revealed multivessel CAD.  Transferred to [facility] for further management and evaluation for revascularization. Patient denies any complaints at this time. He reports mild symptoms of SOB with exertion but denies chest pain or palpitations. He appears euvolemic on exam.  - ACEI/ARB: losartan 100mg daily  - Beta Blocker: 6.25mg BID  - Aldosterone Antagonist: spironolactone 25mg daily  - Diuresis: per chart review, appears patient was most recently on 60mg IV lasix daily with adequate response. He appears euvolemic on exam at this time. Will hold off on additional diuresis tonight and monitor I/Os with goal net negative 1-2L over 24 hours  - Telemetry  - Strict Is/Os, daily weights, cardiac diet, fluid restriction (<1.5L daily)  - ordered CMP, CBC, HgbA1c, lipid panel  - CVTS consulted, appreciate recs    # atrial fibrillation  CHADSVASC of 3 (age, vascular disease, and CHF).  Rates of 90s on admission.  - telemetry  - continue carvedilol 6.25mg BID and digoxin 125mcg daily  - heparin gtt for anticoagulation, holding PTA elliquis    # staph hominis bacteremia vs contamination  Patient was noted to spike a fever on 4/29 at the OSH and was started on vancomycin/ceftriaxone after 1 of 2 blood cultures initially grew GPCs which eventually grew out staph hominis.  Repeat BCx of 5/04 were negative for >24hours at time of discharge per chart review and antibiotics were discontinued   - repeat blood cultures  - will hold off on antibiotics at this time      CHRONIC MEDICAL PROBLEMS  # Chronic back pain: continue PTA cyclobenzaprine PRN  # GERD: continue PTA pantoprazole      Diet: 2 Gram Sodium Diet  Fluid restriction 2000 ML FLUID    Fluids: PO intake  DVT Prophylaxis: heparin gtt  Ag Catheter: not present  Code Status: Full Code      Disposition Plan   Admit to [facility] II service  Entered: Patrick Mena MD  05/05/2020, 8:15 PM       Patient to be formally staffed in the AM.    Patrick Mena MD  Internal Medicine, PGY-2  Cardiology Night Float Service  [facility] [place]  Pager: [phone]  Please see sticky note for cross cover information  ______________________________________________________________________    Chief Complaint   Transfer for CABG evaluation    History is obtained from the patient and paper chart    History of Present Illness   Rudi Schilling is a 70 year old male with a PMH significant for morbid obesity and recently diagnosed atrial fibrillation, multivessel CAD and biventricular HFrEF (EF 10% on 4/21/2020 TTE) who was transferred from an OSH on 5/5/2020 for further management and evaluation for possible revascularization.    OSH Course (4/21 - 5/05)  Patient initially presented to his PCP and was noted to be in atrial fibrillation and was started on metoprolol 25 mg, lisinopril 10 mg, Eliquis 5 mg twice daily.  Further work-up with an echocardiogram revealed EF of 10% with global hypokinesis and moderate TR with reduced RV function.  Patient was called at home and told to come to the hospital.  He reports that his only symptoms at that time were chronic mid/lower back pain.  He did endorse some shortness of breath with exertion but denied any chest pain, palpitations, lightheadedness, dizziness, syncopal episodes.    Upon  arrival to the hospital patient was noted to be in A. fib with RVR with heart rates in the 140s volume overload 2+ lower extremity edema, and labs suggestive of YAHIR, congestive hepatopathy and hyperkalemia.  Patient was started on IV diuretics, heparin drip, and digoxin for rate control (Eliquis and metoprolol were held at that time).  Patient was continued on IV diuresis and medical optimization with up titration of beta-blockers and afterload reduction.  YAHIR, congestive hepatopathy and hyperkalemia resolved with continued diuresis. He underwent diagnostic angiogram on 4/28/2020 which showed multivessel coronary artery disease.  He was evaluated by CT surgery who recommended that he be revascularized at a facility that could pursue LVAD.  Viability study was performed which showed that the left ventricle was viable.    Of note, patient was noted to spike a fever that spontaneously resolved without antipyretics or antibiotic therapy.  1 out of 2 blood cultures returned positive for staph hominis and patient was initially started on ceftriaxone and vancomycin.  Repeat blood cultures on 5/4/2020 were negative for greater than 24 hours at the time of discharge.  And antibiotics were discontinued.    4/28/2020 Coronary Angiogram  LAD:   - proximal LAD: 80% stenosis  LCx:  - Prox CX: 70% stenosis 5 mm length.  - Prox CX: 40% stenosis.  - Mid CX: 80% stenosis 4 mm length  RCA:  - Prox RCA: Ostial.  50% stenosis 8 mm length  - Prox RCA: 80% stenosis 9 mm length  - R PDA: Ostial. 100% stenosis 6 mm length  - R PAV: 8% stenosis 4 mm length  Cardiac collaterals  - Collateral flow from the second septal to the R PDA  - Collateral flow from the first obtuse marginal to the 3rd RPL    4/21/2020 TTE Findings  Left ventricle:  - Left ventricular cavity size is mildly enlarged  - Left ventricular systolic function is severely reduced with an estimated ejection fraction (EF) of 10%, though LVEF is less accurate given atrial  fibrillation with RVR  - Very low stroke volume.  - Very severe global wall hypokinesis  - Diastolic function cannot be accurately assessed given patient's atrial arrhythmia  - Definity contrast was used to improve image quality, very difficult images despite Definity  Right ventricle:  - RV TA PSE measures 1.00 cm, indicative of severely reduced RV function  - Mild to moderate right ventricular enlargement  Left atrium:  - Top-normal left atrial size.  Right atrium:  - Moderate right atrial enlargement  Aortic valve:  - The aortic valve is mildly thickened.  - Aortic valve cusps are not clearly seen.  - Aortic sclerosis without aortic stenosis.  Mitral valve:  - The mitral valve leaflets are not well seen.  - At least mild mitral regurgitation   Tricuspid valve:  - Moderate tricuspid regurgitation.  - RVSP cannot be accurately estimated due to insufficient tricuspid regurgitation.  Pulmonic valve  - Trace pulmonic regurgitation.  Pericardial effusion:  - Trace pericardial effusion without evidence of tamponade physiology.  Miscellaneous:  - Mild ascending aorta dilation, measuring 3.6 cm.  - IVC is dilated 2.7 cm and collapses less than 50% with inspiration suggestive of elevated right atrial pressure.    Review of Systems    The 10 point Review of Systems is negative other than noted in the HPI.    Past Medical History    - atrial fibrillation  - biventricular HFrEF  - multivessel CAD  - morbid obesity  - chronic mid/lower back pain    Past Surgical History   - denies any previous surgeries    Social History   - denies alcohol, tobacco, or illicit drug use    Family History   - hx of heart disease in mother and father    Prior to Admission Medications   - aspirin 81mg   - atorvastatin 40mg daily  - carvedilol 6.25mg BID  - digoxin 125mcg daily  - losartan 100mg daily  - pantoprazole 40mg daily  - spironolactone 25mg daily  - eliquis 5mg BID    Allergies    No Known Allergies    Physical Exam   Vital Signs: Temp:  98.2  F (36.8  C) Temp src: Oral BP: 103/70 Pulse: 108   Resp: 20 SpO2: 97 % O2 Device: None (Room air)    Weight: 286 lbs 11.2 oz    GENERAL: Alert, interactive, NAD  HEENT: AT/NC, sclera anicteric, EOMI, OP clear with MMM  RESP: clear to auscultation bilaterally, no crackles or wheezes  CARDIAC: irregularly irregular, no murmur appreciated  ABDOMEN: Soft, nontender, nondistended. +BS  EXTREMITIES: No LE edema, 2+ DP and radial pulses bialterally  SKIN: Warm and dry, no jaundice or rash  NEURO: alert, moving all 4 extremities equally, grossly nonfocal    Data     Results for orders placed or performed during the hospital encounter of 05/05/20 (from the past 24 hour(s))   CBC with platelets differential   Result Value Ref Range    WBC 9.6 4.0 - 11.0 10e9/L    RBC Count 4.40 4.4 - 5.9 10e12/L    Hemoglobin 12.8 (L) 13.3 - 17.7 g/dL    Hematocrit 42.0 40.0 - 53.0 %    MCV 96 78 - 100 fl    MCH 29.1 26.5 - 33.0 pg    MCHC 30.5 (L) 31.5 - 36.5 g/dL    RDW 14.3 10.0 - 15.0 %    Platelet Count 247 150 - 450 10e9/L    Diff Method Automated Method     % Neutrophils 64.5 %    % Lymphocytes 15.7 %    % Monocytes 10.6 %    % Eosinophils 8.5 %    % Basophils 0.3 %    % Immature Granulocytes 0.4 %    Nucleated RBCs 0 0 /100    Absolute Neutrophil 6.2 1.6 - 8.3 10e9/L    Absolute Lymphocytes 1.5 0.8 - 5.3 10e9/L    Absolute Monocytes 1.0 0.0 - 1.3 10e9/L    Absolute Eosinophils 0.8 (H) 0.0 - 0.7 10e9/L    Absolute Basophils 0.0 0.0 - 0.2 10e9/L    Abs Immature Granulocytes 0.0 0 - 0.4 10e9/L    Absolute Nucleated RBC 0.0    Comprehensive metabolic panel   Result Value Ref Range    Sodium 138 133 - 144 mmol/L    Potassium 4.8 3.4 - 5.3 mmol/L    Chloride 102 94 - 109 mmol/L    Carbon Dioxide 31 20 - 32 mmol/L    Anion Gap 5 3 - 14 mmol/L    Glucose 121 (H) 70 - 99 mg/dL    Urea Nitrogen 15 7 - 30 mg/dL    Creatinine 0.82 0.66 - 1.25 mg/dL    GFR Estimate >90 >60 mL/min/[1.73_m2]    GFR Estimate If Black >90 >60 mL/min/[1.73_m2]     Calcium 9.3 8.5 - 10.1 mg/dL    Bilirubin Total 0.6 0.2 - 1.3 mg/dL    Albumin 2.9 (L) 3.4 - 5.0 g/dL    Protein Total 7.2 6.8 - 8.8 g/dL    Alkaline Phosphatase 40 40 - 150 U/L    ALT 27 0 - 70 U/L    AST 19 0 - 45 U/L   Magnesium   Result Value Ref Range    Magnesium 1.9 1.6 - 2.3 mg/dL     I have reviewed today's vital signs, notes, medications, labs and imaging.  I have also seen and examined the patient and agree with the findings and plan as outlined above.  Pt is 69 yo obese male with severe LV dysfn (EF 10%) with 3V CAD transferred for high risk CABG and currently pain free.  Plan is to continue oral reverse remodeling agents, check COVID-19 status and have LVAD education.     Tapan Newby MD, PhD  Professor, Heart Failure and Cardiac Transplantation  [facility]

## 2020-05-06 NOTE — CONSULTS
CARDIOTHORACIC SURGERY CONSULT NOTE  5/6/2020      Reason for Consult: CABG      ASSESSMENT/PLAN: Patient is a 70 year old male with a history of morbid obesity with recent diagnosis of atrial fibrillation, multivessel coronary artery disease, biventricular HFrEF (EF 10%) who was transferred from [facility] ([place], [place]) to [facility] for further management and evaluation for high risk revascularization. Per reports, thallium viability study performed at OS showed viable myocardium in all territories.     - Plan for CABG this admission, tentatively planning for Friday 5/8  - Recommend IABP placement prior to OR, will discuss with cardiology team once OR date is finalized.   - Will obtain repeat echo, vein mapping, bilateral carotid duplex US, CT chest w/o contrast, UA, type and screen.   - Other cares per primary team  - Thank you for the opportunity to participate in the care of this patient.    Patient and plan discussed with attending, Dr. Yobany Holloway.      Mando Velazco PA-C  Cardiothoracic Surgery  May 6, 2020 9:29 AM   p: [phone]     ________________________________________________________________________________________________    HPI: Patient is a 70 year old male with a history of morbid obesity with recent diagnosis of atrial fibrillation, multivessel coronary artery disease, biventricular HFrEF (EF 10%) who was transferred from [facility] ([place], [place]) to [facility] for further management and evaluation for high risk revascularization. Per reports, thallium viability study performed at [facility] showed viable myocardium in all territories.     Patient reports only symptoms of shortness of breath and fluid retention which led to his hospitalization. He denied having any chest pain, lightheadedness, dizziness.     Patient currently has no acute complaints. He otherwise denies any chest pain, SOB, lightheadedness, dizziness, palpitations, LE edema.     PMH:  No past medical history on file.    PSH:  No past surgical history  on file.    FH:  No family history on file.    SH:  Social History     Socioeconomic History     Marital status: Single     Spouse name: Not on file     Number of children: Not on file     Years of education: Not on file     Highest education level: Not on file   Occupational History     Not on file   Social Needs     Financial resource strain: Not on file     Food insecurity     Worry: Not on file     Inability: Not on file     Transportation needs     Medical: Not on file     Non-medical: Not on file   Tobacco Use     Smoking status: Not on file   Substance and Sexual Activity     Alcohol use: Not on file     Drug use: Not on file     Sexual activity: Not on file   Lifestyle     Physical activity     Days per week: Not on file     Minutes per session: Not on file     Stress: Not on file   Relationships     Social connections     Talks on phone: Not on file     Gets together: Not on file     Attends Alevism service: Not on file     Active member of club or organization: Not on file     Attends meetings of clubs or organizations: Not on file     Relationship status: Not on file     Intimate partner violence     Fear of current or ex partner: Not on file     Emotionally abused: Not on file     Physically abused: Not on file     Forced sexual activity: Not on file   Other Topics Concern     Not on file   Social History Narrative     Not on file       Home Meds:  No medications prior to admission.       Allergies:  No Known Allergies    ROS: No fevers, chills, or night sweats. No recent weight changes.  No new visual or hearing complaints. No sore throat or nasal congestion. No SOB, cough, or wheezing.  No CP, palpitations, syncopal episodes, or dependent edema.  No new muscle or joint pain.  No weakness, numbness, or tingling of extremities. No new headaches. No changes in memory, mood, or affect.  No new rashes or bruises.     Physical Exam:  Temp:  [98.2  F (36.8  C)-98.9  F (37.2  C)] 98.6  F (37  C)  Pulse:   [101-108] 108  Heart Rate:  [] 86  Resp:  [16-20] 16  BP: (103-128)/(61-96) 122/96  SpO2:  [92 %-98 %] 92 %  Gen: NAD, resting comfortably in bed, conversational  HEENT: normocephalic, atraumatic cranium, EOMI, sclerae anicteric. Oral mucosa pink and moist, no tonsillar edema or erythema, midline trachea, nonpalpable thyroid  Lungs: CTA in all fields, no wheezing or rhonchi  CV: Irregular, irregular rate and rhythm, S1S2 normal, no murmur. Radial pulses and DP pulses symmetric. No dependent edema.   Abd: Positive normal pitched bowel sounds, overall soft and non distended, nontender, no hepatosplenomegaly, no masses/guarding/rebound tenderness.   Musculoskeletal: grossly intact, strength 5/5 upper and lower extremities  Neuro: AOx3, CN II-VII grossly intact, sensation/motor intact in upper and lower extremities  Mental: normal mood and affect, regular rate of speech    Labs:  ABG No lab results found in last 7 days.  CBC  Recent Labs   Lab 05/06/20 0407 05/05/20  2134 05/05/20  1823   WBC 8.4 9.1 9.6   HGB 12.2* 12.8* 12.8*    244 247     BMP  Recent Labs   Lab 05/06/20 0407 05/05/20  1823    138   POTASSIUM 4.8 4.8   CHLORIDE 103 102   CO2 31 31   BUN 16 15   CR 1.00 0.82   * 121*     LFT  Recent Labs   Lab 05/06/20  0407 05/05/20  1823   AST 19 19   ALT 28 27   ALKPHOS 41 40   BILITOTAL 0.6 0.6   ALBUMIN 2.8* 2.9*   INR 1.13  --      PancreasNo lab results found in last 7 days.    Imaging:  No results found for this or any previous visit (from the past 24 hour(s)).   From OSH: (imaging being uploaded)  4/28/2020 Coronary Angiogram  LAD:               - proximal LAD: 80% stenosis  LCx:  - Prox CX: 70% stenosis 5 mm length.  - Prox CX: 40% stenosis.  - Mid CX: 80% stenosis 4 mm length  RCA:  - Prox RCA: Ostial.  50% stenosis 8 mm length  - Prox RCA: 80% stenosis 9 mm length  - R PDA: Ostial. 100% stenosis 6 mm length  - R PAV: 8% stenosis 4 mm length  Cardiac collaterals  - Collateral  flow from the second septal to the R PDA  - Collateral flow from the first obtuse marginal to the 3rd RPL     4/21/2020 TTE Findings  Left ventricle:  - Left ventricular cavity size is mildly enlarged  - Left ventricular systolic function is severely reduced with an estimated ejection fraction (EF) of 10%, though LVEF is less accurate given atrial fibrillation with RVR  - Very low stroke volume.  - Very severe global wall hypokinesis  - Diastolic function cannot be accurately assessed given patient's atrial arrhythmia  - Definity contrast was used to improve image quality, very difficult images despite Definity  Right ventricle:  - RV TA PSE measures 1.00 cm, indicative of severely reduced RV function  - Mild to moderate right ventricular enlargement  Left atrium:  - Top-normal left atrial size.  Right atrium:  - Moderate right atrial enlargement  Aortic valve:  - The aortic valve is mildly thickened.  - Aortic valve cusps are not clearly seen.  - Aortic sclerosis without aortic stenosis.  Mitral valve:  - The mitral valve leaflets are not well seen.  - At least mild mitral regurgitation   Tricuspid valve:  - Moderate tricuspid regurgitation.  - RVSP cannot be accurately estimated due to insufficient tricuspid regurgitation.  Pulmonic valve  - Trace pulmonic regurgitation.  Pericardial effusion:  - Trace pericardial effusion without evidence of tamponade physiology.  Miscellaneous:  - Mild ascending aorta dilation, measuring 3.6 cm.  - IVC is dilated 2.7 cm and collapses less than 50% with inspiration suggestive of elevated right atrial pressure.

## 2020-05-06 NOTE — CONSULTS
Social Work Services Progress Note    Hospital Day: 2  Date of Initial Social Work Evaluation:  Not yet completed  Collaborated with:  Pt     Data:  SW consulted for advanced care planning.     Intervention:  SW met with pt at bedside to introduce self and role on treatment team. Pt confirmed he was interested in completing a healthcare directive. SW provided education on healthcare directives to pt. Pt reports he believes he would like his healthcare agents to be his sister and niece.   SW provided honoring choices education form and short health care directive. Informed pt that at this time, notary services are limited and if pt fills HCD out, it may be able to be notarized in the hospital or suggested pt go to his local bank to get HCD notarized. Pt understanding.     Assessment:  Pt stated he will fill out HCD, appreciative of SW visit.     Plan:    Anticipated Disposition:  TBD    Barriers to d/c plan:  TBD    Follow Up:  SW available as needed    ISACC Bond, ELIZABETH  6C/6D Adult Acute Care SW  Ph:[phone]  Pager [phone]  Unit [phone]

## 2020-05-06 NOTE — PROGRESS NOTES
Admission          5/5/2020   ~ 5 PM  -----------------------------------------------------------  Diagnosis: Multi-vessel CAD     Admitted from: [facility]  For: Possible CABG  Report given from: TRACY Pérez RN  Via: stretcher  Accompanied by: self  Family Aware of Admission: Yes. Writer talked to patient's sister (Isis) on phone about updates, family contacts and numbers to contact the unit/patient room.  Medications: None brought by patient  Chart: In cub  Belongings: In patient room  Admission Profile: Completed  Teaching: Orientation to unit, call don't fall, symptoms to report, I&O, plan of care, medications, diet  Access: R PIV  Telemetry: placed on patient  Ht./Wt.: Done  2 RN Skin Check: Completed with  and JOSE Gonsalez. Skin intact. Generalized scabs/bruising. Yeasty rash in groin/pannus.

## 2020-05-06 NOTE — PLAN OF CARE
Hx: Pt was transferred from [place] for A. Fib, RVR post coronary angiogram in need of a CABG. Recent hx of (+) BCx, though re-ordered and BC (-) since 5/4. Pt is being treated for severe biventricular HFrEF (LVEF 10% on a 4/21 TTE). Angiogram on 04/28 completed and revealed multivessel CAD.  Orientation: Disoriented to time, Ambulation: SBA.  V/S: VSS throughout 12 hr period. Pain: Denies pain.  Tele/CV: A. Fib borderline CVR / RVR. HR in 80's - 110's..  LS: Clear, equal bilaterally.  : Voids spontanously.  GI: Active & audible X4. Pt complained of loose stools overnight.  IV: Heparin infusing at 1800 U/Hr. Next Hep 10A lab is due at 2100.  Plan: Echocardiogram completed during shift c/ EF of 30 - 35%. Plan for a CABG tentatively set for Friday (5/8).  Other: SARS-CoV-2 PCR negative during shift. UA sent. Results pending.

## 2020-05-07 ENCOUNTER — APPOINTMENT (OUTPATIENT)
Dept: ULTRASOUND IMAGING | Facility: CLINIC | Age: 70
DRG: 234 | End: 2020-05-07
Attending: STUDENT IN AN ORGANIZED HEALTH CARE EDUCATION/TRAINING PROGRAM
Payer: MEDICARE

## 2020-05-07 ENCOUNTER — APPOINTMENT (OUTPATIENT)
Dept: CT IMAGING | Facility: CLINIC | Age: 70
DRG: 234 | End: 2020-05-07
Attending: STUDENT IN AN ORGANIZED HEALTH CARE EDUCATION/TRAINING PROGRAM
Payer: MEDICARE

## 2020-05-07 ENCOUNTER — APPOINTMENT (OUTPATIENT)
Dept: GENERAL RADIOLOGY | Facility: CLINIC | Age: 70
DRG: 234 | End: 2020-05-07
Attending: STUDENT IN AN ORGANIZED HEALTH CARE EDUCATION/TRAINING PROGRAM
Payer: MEDICARE

## 2020-05-07 ENCOUNTER — APPOINTMENT (OUTPATIENT)
Dept: CT IMAGING | Facility: CLINIC | Age: 70
DRG: 234 | End: 2020-05-07
Attending: THORACIC SURGERY (CARDIOTHORACIC VASCULAR SURGERY)
Payer: MEDICARE

## 2020-05-07 ENCOUNTER — ANESTHESIA EVENT (OUTPATIENT)
Dept: SURGERY | Facility: CLINIC | Age: 70
DRG: 234 | End: 2020-05-07
Payer: MEDICARE

## 2020-05-07 LAB
ALBUMIN SERPL-MCNC: 3.1 G/DL (ref 3.4–5)
ALP SERPL-CCNC: 42 U/L (ref 40–150)
ALT SERPL W P-5'-P-CCNC: 28 U/L (ref 0–70)
ANION GAP SERPL CALCULATED.3IONS-SCNC: 7 MMOL/L (ref 3–14)
APTT PPP: 70 SEC (ref 22–37)
AST SERPL W P-5'-P-CCNC: 19 U/L (ref 0–45)
BASE EXCESS BLDV CALC-SCNC: 6 MMOL/L
BASOPHILS # BLD AUTO: 0.1 10E9/L (ref 0–0.2)
BASOPHILS NFR BLD AUTO: 0.7 %
BILIRUB DIRECT SERPL-MCNC: 0.2 MG/DL (ref 0–0.2)
BILIRUB SERPL-MCNC: 0.6 MG/DL (ref 0.2–1.3)
BUN SERPL-MCNC: 16 MG/DL (ref 7–30)
CALCIUM SERPL-MCNC: 9.3 MG/DL (ref 8.5–10.1)
CHLORIDE SERPL-SCNC: 102 MMOL/L (ref 94–109)
CHOLEST SERPL-MCNC: 128 MG/DL
CO2 SERPL-SCNC: 29 MMOL/L (ref 20–32)
CREAT SERPL-MCNC: 0.86 MG/DL (ref 0.66–1.25)
DIFFERENTIAL METHOD BLD: ABNORMAL
EOSINOPHIL # BLD AUTO: 1 10E9/L (ref 0–0.7)
EOSINOPHIL NFR BLD AUTO: 11.3 %
ERYTHROCYTE [DISTWIDTH] IN BLOOD BY AUTOMATED COUNT: 14.3 % (ref 10–15)
FERRITIN SERPL-MCNC: 275 NG/ML (ref 26–388)
GFR SERPL CREATININE-BSD FRML MDRD: 89 ML/MIN/{1.73_M2}
GLUCOSE BLDC GLUCOMTR-MCNC: 100 MG/DL (ref 70–99)
GLUCOSE BLDC GLUCOMTR-MCNC: 162 MG/DL (ref 70–99)
GLUCOSE SERPL-MCNC: 114 MG/DL (ref 70–99)
HCO3 BLDV-SCNC: 33 MMOL/L (ref 21–28)
HCT VFR BLD AUTO: 41.6 % (ref 40–53)
HDLC SERPL-MCNC: 40 MG/DL
HGB BLD-MCNC: 12.6 G/DL (ref 13.3–17.7)
IMM GRANULOCYTES # BLD: 0 10E9/L (ref 0–0.4)
IMM GRANULOCYTES NFR BLD: 0.5 %
INR PPP: 1.18 (ref 0.86–1.14)
INR PPP: 1.2 (ref 0.86–1.14)
IRON SATN MFR SERPL: 19 % (ref 15–46)
IRON SERPL-MCNC: 52 UG/DL (ref 35–180)
LDLC SERPL CALC-MCNC: 69 MG/DL
LMWH PPP CHRO-ACNC: 0.24 IU/ML
LMWH PPP CHRO-ACNC: 0.32 IU/ML
LMWH PPP CHRO-ACNC: <0.1 IU/ML
LYMPHOCYTES # BLD AUTO: 1.6 10E9/L (ref 0.8–5.3)
LYMPHOCYTES NFR BLD AUTO: 18.6 %
MAGNESIUM SERPL-MCNC: 2.1 MG/DL (ref 1.6–2.3)
MCH RBC QN AUTO: 28.8 PG (ref 26.5–33)
MCHC RBC AUTO-ENTMCNC: 30.3 G/DL (ref 31.5–36.5)
MCV RBC AUTO: 95 FL (ref 78–100)
MONOCYTES # BLD AUTO: 0.9 10E9/L (ref 0–1.3)
MONOCYTES NFR BLD AUTO: 10 %
NEUTROPHILS # BLD AUTO: 5.1 10E9/L (ref 1.6–8.3)
NEUTROPHILS NFR BLD AUTO: 58.9 %
NONHDLC SERPL-MCNC: 88 MG/DL
NRBC # BLD AUTO: 0 10*3/UL
NRBC BLD AUTO-RTO: 0 /100
NT-PROBNP SERPL-MCNC: 1004 PG/ML (ref 0–900)
O2/TOTAL GAS SETTING VFR VENT: ABNORMAL %
OXYHGB MFR BLDV: 67 %
PCO2 BLDV: 58 MM HG (ref 40–50)
PH BLDV: 7.36 PH (ref 7.32–7.43)
PHOSPHATE SERPL-MCNC: 3.6 MG/DL (ref 2.5–4.5)
PLATELET # BLD AUTO: 289 10E9/L (ref 150–450)
PO2 BLDV: 38 MM HG (ref 25–47)
POTASSIUM SERPL-SCNC: 4.4 MMOL/L (ref 3.4–5.3)
PROT SERPL-MCNC: 7.2 G/DL (ref 6.8–8.8)
RBC # BLD AUTO: 4.37 10E12/L (ref 4.4–5.9)
SODIUM SERPL-SCNC: 138 MMOL/L (ref 133–144)
TIBC SERPL-MCNC: 280 UG/DL (ref 240–430)
TRANSFERRIN SERPL-MCNC: 222 MG/DL (ref 210–360)
TRIGL SERPL-MCNC: 93 MG/DL
TSH SERPL DL<=0.005 MIU/L-ACNC: 5.12 MU/L (ref 0.4–4)
WBC # BLD AUTO: 8.7 10E9/L (ref 4–11)

## 2020-05-07 PROCEDURE — 20000004 ZZH R&B ICU UMMC

## 2020-05-07 PROCEDURE — 94150 VITAL CAPACITY TEST: CPT

## 2020-05-07 PROCEDURE — 99233 SBSQ HOSP IP/OBS HIGH 50: CPT | Mod: 25 | Performed by: INTERNAL MEDICINE

## 2020-05-07 PROCEDURE — C1894 INTRO/SHEATH, NON-LASER: HCPCS | Performed by: INTERNAL MEDICINE

## 2020-05-07 PROCEDURE — 25000128 H RX IP 250 OP 636: Performed by: INTERNAL MEDICINE

## 2020-05-07 PROCEDURE — 85610 PROTHROMBIN TIME: CPT | Performed by: STUDENT IN AN ORGANIZED HEALTH CARE EDUCATION/TRAINING PROGRAM

## 2020-05-07 PROCEDURE — 4A023N6 MEASUREMENT OF CARDIAC SAMPLING AND PRESSURE, RIGHT HEART, PERCUTANEOUS APPROACH: ICD-10-PCS | Performed by: INTERNAL MEDICINE

## 2020-05-07 PROCEDURE — 71045 X-RAY EXAM CHEST 1 VIEW: CPT

## 2020-05-07 PROCEDURE — 85520 HEPARIN ASSAY: CPT | Performed by: STUDENT IN AN ORGANIZED HEALTH CARE EDUCATION/TRAINING PROGRAM

## 2020-05-07 PROCEDURE — 83735 ASSAY OF MAGNESIUM: CPT | Performed by: STUDENT IN AN ORGANIZED HEALTH CARE EDUCATION/TRAINING PROGRAM

## 2020-05-07 PROCEDURE — 99152 MOD SED SAME PHYS/QHP 5/>YRS: CPT | Performed by: INTERNAL MEDICINE

## 2020-05-07 PROCEDURE — 02HQ32Z INSERTION OF MONITORING DEVICE INTO RIGHT PULMONARY ARTERY, PERCUTANEOUS APPROACH: ICD-10-PCS | Performed by: INTERNAL MEDICINE

## 2020-05-07 PROCEDURE — 99153 MOD SED SAME PHYS/QHP EA: CPT | Performed by: INTERNAL MEDICINE

## 2020-05-07 PROCEDURE — 25000132 ZZH RX MED GY IP 250 OP 250 PS 637: Mod: GY | Performed by: STUDENT IN AN ORGANIZED HEALTH CARE EDUCATION/TRAINING PROGRAM

## 2020-05-07 PROCEDURE — 83880 ASSAY OF NATRIURETIC PEPTIDE: CPT | Performed by: STUDENT IN AN ORGANIZED HEALTH CARE EDUCATION/TRAINING PROGRAM

## 2020-05-07 PROCEDURE — 94010 BREATHING CAPACITY TEST: CPT

## 2020-05-07 PROCEDURE — 80061 LIPID PANEL: CPT | Performed by: STUDENT IN AN ORGANIZED HEALTH CARE EDUCATION/TRAINING PROGRAM

## 2020-05-07 PROCEDURE — 36415 COLL VENOUS BLD VENIPUNCTURE: CPT | Performed by: STUDENT IN AN ORGANIZED HEALTH CARE EDUCATION/TRAINING PROGRAM

## 2020-05-07 PROCEDURE — 85525 HEPARIN NEUTRALIZATION: CPT | Performed by: STUDENT IN AN ORGANIZED HEALTH CARE EDUCATION/TRAINING PROGRAM

## 2020-05-07 PROCEDURE — 85730 THROMBOPLASTIN TIME PARTIAL: CPT | Performed by: STUDENT IN AN ORGANIZED HEALTH CARE EDUCATION/TRAINING PROGRAM

## 2020-05-07 PROCEDURE — 80321 ALCOHOLS BIOMARKERS 1OR 2: CPT | Performed by: STUDENT IN AN ORGANIZED HEALTH CARE EDUCATION/TRAINING PROGRAM

## 2020-05-07 PROCEDURE — 84466 ASSAY OF TRANSFERRIN: CPT | Performed by: STUDENT IN AN ORGANIZED HEALTH CARE EDUCATION/TRAINING PROGRAM

## 2020-05-07 PROCEDURE — 85613 RUSSELL VIPER VENOM DILUTED: CPT | Performed by: STUDENT IN AN ORGANIZED HEALTH CARE EDUCATION/TRAINING PROGRAM

## 2020-05-07 PROCEDURE — 00000146 ZZHCL STATISTIC GLUCOSE BY METER IP

## 2020-05-07 PROCEDURE — 82805 BLOOD GASES W/O2 SATURATION: CPT | Performed by: STUDENT IN AN ORGANIZED HEALTH CARE EDUCATION/TRAINING PROGRAM

## 2020-05-07 PROCEDURE — 83540 ASSAY OF IRON: CPT | Performed by: STUDENT IN AN ORGANIZED HEALTH CARE EDUCATION/TRAINING PROGRAM

## 2020-05-07 PROCEDURE — 36415 COLL VENOUS BLD VENIPUNCTURE: CPT | Performed by: INTERNAL MEDICINE

## 2020-05-07 PROCEDURE — 85027 COMPLETE CBC AUTOMATED: CPT | Performed by: STUDENT IN AN ORGANIZED HEALTH CARE EDUCATION/TRAINING PROGRAM

## 2020-05-07 PROCEDURE — 76700 US EXAM ABDOM COMPLETE: CPT

## 2020-05-07 PROCEDURE — 70486 CT MAXILLOFACIAL W/O DYE: CPT

## 2020-05-07 PROCEDURE — 4A133B3 MONITORING OF ARTERIAL PRESSURE, PULMONARY, PERCUTANEOUS APPROACH: ICD-10-PCS | Performed by: INTERNAL MEDICINE

## 2020-05-07 PROCEDURE — 84134 ASSAY OF PREALBUMIN: CPT | Performed by: STUDENT IN AN ORGANIZED HEALTH CARE EDUCATION/TRAINING PROGRAM

## 2020-05-07 PROCEDURE — 93451 RIGHT HEART CATH: CPT | Performed by: INTERNAL MEDICINE

## 2020-05-07 PROCEDURE — 85004 AUTOMATED DIFF WBC COUNT: CPT | Performed by: STUDENT IN AN ORGANIZED HEALTH CARE EDUCATION/TRAINING PROGRAM

## 2020-05-07 PROCEDURE — 93925 LOWER EXTREMITY STUDY: CPT

## 2020-05-07 PROCEDURE — 82728 ASSAY OF FERRITIN: CPT | Performed by: STUDENT IN AN ORGANIZED HEALTH CARE EDUCATION/TRAINING PROGRAM

## 2020-05-07 PROCEDURE — 25000128 H RX IP 250 OP 636: Performed by: STUDENT IN AN ORGANIZED HEALTH CARE EDUCATION/TRAINING PROGRAM

## 2020-05-07 PROCEDURE — 70450 CT HEAD/BRAIN W/O DYE: CPT

## 2020-05-07 PROCEDURE — 40000275 ZZH STATISTIC RCP TIME EA 10 MIN

## 2020-05-07 PROCEDURE — 84100 ASSAY OF PHOSPHORUS: CPT | Performed by: STUDENT IN AN ORGANIZED HEALTH CARE EDUCATION/TRAINING PROGRAM

## 2020-05-07 PROCEDURE — 00000401 ZZHCL STATISTIC THROMBIN TIME NC: Performed by: STUDENT IN AN ORGANIZED HEALTH CARE EDUCATION/TRAINING PROGRAM

## 2020-05-07 PROCEDURE — 71250 CT THORAX DX C-: CPT

## 2020-05-07 PROCEDURE — 40000281 ZZH STATISTIC TRANSPORT TIME EA 15 MIN

## 2020-05-07 PROCEDURE — 25000125 ZZHC RX 250: Performed by: INTERNAL MEDICINE

## 2020-05-07 PROCEDURE — 5A02210 ASSISTANCE WITH CARDIAC OUTPUT USING BALLOON PUMP, CONTINUOUS: ICD-10-PCS | Performed by: INTERNAL MEDICINE

## 2020-05-07 PROCEDURE — 80048 BASIC METABOLIC PNL TOTAL CA: CPT | Performed by: STUDENT IN AN ORGANIZED HEALTH CARE EDUCATION/TRAINING PROGRAM

## 2020-05-07 PROCEDURE — 84443 ASSAY THYROID STIM HORMONE: CPT | Performed by: STUDENT IN AN ORGANIZED HEALTH CARE EDUCATION/TRAINING PROGRAM

## 2020-05-07 PROCEDURE — 27210794 ZZH OR GENERAL SUPPLY STERILE: Performed by: INTERNAL MEDICINE

## 2020-05-07 PROCEDURE — 4A1239Z MONITORING OF CARDIAC OUTPUT, PERCUTANEOUS APPROACH: ICD-10-PCS | Performed by: INTERNAL MEDICINE

## 2020-05-07 PROCEDURE — 83550 IRON BINDING TEST: CPT | Performed by: STUDENT IN AN ORGANIZED HEALTH CARE EDUCATION/TRAINING PROGRAM

## 2020-05-07 PROCEDURE — 33967 INSERT I-AORT PERCUT DEVICE: CPT | Performed by: INTERNAL MEDICINE

## 2020-05-07 PROCEDURE — 93922 UPR/L XTREMITY ART 2 LEVELS: CPT

## 2020-05-07 PROCEDURE — 85520 HEPARIN ASSAY: CPT | Performed by: INTERNAL MEDICINE

## 2020-05-07 PROCEDURE — 80076 HEPATIC FUNCTION PANEL: CPT | Performed by: STUDENT IN AN ORGANIZED HEALTH CARE EDUCATION/TRAINING PROGRAM

## 2020-05-07 RX ORDER — NALOXONE HYDROCHLORIDE 0.4 MG/ML
.1-.4 INJECTION, SOLUTION INTRAMUSCULAR; INTRAVENOUS; SUBCUTANEOUS
Status: DISCONTINUED | OUTPATIENT
Start: 2020-05-07 | End: 2020-05-08

## 2020-05-07 RX ORDER — FENTANYL CITRATE 50 UG/ML
INJECTION, SOLUTION INTRAMUSCULAR; INTRAVENOUS
Status: DISCONTINUED | OUTPATIENT
Start: 2020-05-07 | End: 2020-05-07 | Stop reason: HOSPADM

## 2020-05-07 RX ORDER — HYDRALAZINE HYDROCHLORIDE 20 MG/ML
10 INJECTION INTRAMUSCULAR; INTRAVENOUS EVERY 6 HOURS PRN
Status: DISCONTINUED | OUTPATIENT
Start: 2020-05-07 | End: 2020-05-09

## 2020-05-07 RX ORDER — SODIUM CHLORIDE 9 MG/ML
INJECTION, SOLUTION INTRAVENOUS CONTINUOUS
Status: DISCONTINUED | OUTPATIENT
Start: 2020-05-07 | End: 2020-05-09

## 2020-05-07 RX ORDER — LANOLIN ALCOHOL/MO/W.PET/CERES
3 CREAM (GRAM) TOPICAL
Status: DISCONTINUED | OUTPATIENT
Start: 2020-05-07 | End: 2020-05-08

## 2020-05-07 RX ADMIN — ATORVASTATIN CALCIUM 40 MG: 40 TABLET, FILM COATED ORAL at 19:26

## 2020-05-07 RX ADMIN — ASPIRIN 81 MG CHEWABLE TABLET 81 MG: 81 TABLET CHEWABLE at 10:23

## 2020-05-07 RX ADMIN — HEPARIN SODIUM 1950 UNITS/HR: 10000 INJECTION, SOLUTION INTRAVENOUS at 12:02

## 2020-05-07 RX ADMIN — HEPARIN SODIUM 1950 UNITS/HR: 10000 INJECTION, SOLUTION INTRAVENOUS at 19:22

## 2020-05-07 RX ADMIN — PANTOPRAZOLE SODIUM 40 MG: 40 TABLET, DELAYED RELEASE ORAL at 10:23

## 2020-05-07 RX ADMIN — FUROSEMIDE 40 MG: 40 TABLET ORAL at 10:24

## 2020-05-07 RX ADMIN — DIGOXIN 125 MCG: 0.06 TABLET ORAL at 10:23

## 2020-05-07 RX ADMIN — MELATONIN TAB 3 MG 3 MG: 3 TAB at 03:05

## 2020-05-07 RX ADMIN — CARVEDILOL 6.25 MG: 6.25 TABLET, FILM COATED ORAL at 10:24

## 2020-05-07 RX ADMIN — MUPIROCIN 1 G: 20 OINTMENT TOPICAL at 10:31

## 2020-05-07 RX ADMIN — CARVEDILOL 6.25 MG: 6.25 TABLET, FILM COATED ORAL at 19:27

## 2020-05-07 RX ADMIN — LOSARTAN POTASSIUM 100 MG: 100 TABLET, FILM COATED ORAL at 10:23

## 2020-05-07 RX ADMIN — SPIRONOLACTONE 25 MG: 25 TABLET ORAL at 10:24

## 2020-05-07 ASSESSMENT — ACTIVITIES OF DAILY LIVING (ADL)
ADLS_ACUITY_SCORE: 12
ADLS_ACUITY_SCORE: 14
ADLS_ACUITY_SCORE: 12
ADLS_ACUITY_SCORE: 12

## 2020-05-07 ASSESSMENT — ENCOUNTER SYMPTOMS: DYSRHYTHMIAS: 1

## 2020-05-07 ASSESSMENT — MIFFLIN-ST. JEOR: SCORE: 2069.25

## 2020-05-07 NOTE — PLAN OF CARE
Admitted/transferred from: Cath lab      Reason for admission/transfer: IABP and swan placed, CAB scheduled for tomorrow  2 RN skin assessment: Meka Milner A  Result of skin assessment and interventions/actions: Generalized bruising and scabs, no significant skin issues noted.  Height, weight, drug calc weight: Done  Patient belongings (see Flowsheet)  MDRO education added to care planN/A  ?

## 2020-05-07 NOTE — PROGRESS NOTES
[facility], [place]  Procedure Note          Intra-Aortic Balloon Pump Insertion:       Rudi Schilling  MRN# [id number]   May 7, 2020, 6:19 PM Indication: Unstable angina           Procedure performed: May 7, 2020, 6:19 PM   Location: [facility]   Catheter size: 8.0     Inserted:    Catheter placed: Right femoral artery   Complications:: None     Assist initiated: 1:1 ratio     Percent augmentation: 100   Timing adjusted: Adjusted to achieve optimal assist   Verification of position: Fluoroscopy   Comments: None      Recorded by Ioana Hopkins

## 2020-05-07 NOTE — CONSULTS
Patient of Dr. Newby seen in the inpatient setting for psychosocial assessment.   60 minutes spent with patient. 100% of visit consisted of counseling related to issues surrounding LVAD as potential backup to CABG..    Psychosocial Assessment   Name: Rudi Schilling     MRN:  [id number]        Patient Name / Age / Race: Rudi Schilling 70 year old    Source of Information: Patient and niece, Bea, via phone [phone]   : [facility]   Interview Date: May 7, 2020   Reason for Referral: Mechanical Circulatory Support     Current Living Situation   Location (City/State): [address] [place] [id number]   With Whom: Living arrangements - the patient lives alone.   Type of Home: single family   Working Phone? Yes  cell phone: [phone]   Three Pronged Outlet? No   Distance from Hospital: 2hrs 45 minutes   Need to Relocate Post Surgery? Yes    Discussed? 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 No   Income: [place] jail and other: some income from raising horses    Insurance: Medicare   Name of Private Insurance: N/a    Medication Coverage: Prior to hospitalization pt was not on medications     In current situation, pt. can afford medication and supply costs:  Unknown-pt currently does not have a Medicare D plan and has never been on medication.   Other Financial Concerns/Issues: Pt reports he has applied for MANNY RUIZ and this is currently pending      Family/Social Support   Marital Status: single (never )   Partner Name: n/a   Length of Time : n/a   Partner s Employment: n/a   Relationship: other: n/a   Children: none   Parents:    Siblings: 4 siblings: 3 brothers and 1 sister  Shanell and Reuben live near pt and Mannie lives in [place]  Pt's sister, Isis, lives in [place], [place]    Other Family or Friends Close by: None    Support System: Pt identifies  his siblings as all supportive.   Primary Support Person: Sister: Isis    Issues: Writer had planned to contact pt's sister to discuss caregiver role and expectations. However, Isis informed LVAD coordinator that she preferred not to be called at this time and deferred SW phone call to pt's niece Bea. In speaking with Bea, she indicates that it will be difficult for any family to provide short and long-term caregiver support.      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   Advanced Directive? No    Details: Pt was given copy of HCD yesterday and has not completed. Pt identifies his sister Isis as primary decision maker. Due to limited time unlikely HCD will be completed prior to surgery tomorrow. Pt's four siblings will have equal decision making capacity in the absence of a HCD. Pt has no children and is not .    Adherence History: Pt reports he has not been to a doctor for 20yrs    Ability to Adhere to Complex Medical Regime: Pt overwhelmed and voicing some uncertainty with managing an LVAD.      Behavioral   Chemical Dependency Issues: No: Denies hx of drug or alcohol abuse. Has never had legal consequences related to drinking or drugs. Last drank 20 yrs ago.    Smoker? No   Psychiatric impairment: No: Denies hx of psychiatric hospitalizations or suicide attempts.    Coping Style/Strategies: Enjoys horses    Recent Legal History: No   Teaching Completed During Assessment Related To Mechanical Circulatory Support: 1. Housing and relocation needs post surgery.  2. Caregiver needs post surgery.  3. Financial issues related to surgery.  4. Risks of alcohol use post surgery.  5. Common psychosocial stressors pre/post  surgery.  6. Support group availability.   Psychosocial Risks Reviewed Related To Mechanical Circulatory Support: 1. Increased stress related to your emotional, family, social, employment, or financial situation.  2. Affect on work and/or disability benefits.  3. Affect on future health and life insurance.  4. Outcome expectations may not be met.  5. Mental Health risks: anxiety, depression, PTSD, guilt, grief and chronic fatigue.     Observed Behavior   Well informed? Yes  Angry? No   Process info well? overwhelmed  Hostile? No   Evasive? No Oriented X3? Yes    Cautious/Suspicious? No Motivated? Pt does not think he would want an LVAD   Appropriate Behavior? Yes  Depressed? No   Appropriate Affect? No Anxious? Yes    Interview Observations: Pt expressed feeling anxious with anticipated CABG procedure tomorrow and even more overwhelmed with discussions around LVAD. Pt appeared withdrawn and did not ask questions.      Selection Criteria Met   Plan for Support No   Chemical Dependence Yes    Smoking Yes    Mental Health Yes    Adequate Finances No     Risk Issues:    - Pt does not have an established caregiver support plan.   -Pt currently only has Medicare insurance.   -Pt is unsure if he would want an LVAD.      Final Evaluation/Assessment:     Pt receptive to meeting with writer to discuss LVAD but was very overwhelmed with information received. Pt is leaning towards not wanting an LVAD, but wanted to discuss with the rest of his family. Writer was able to speak to pt's niece, Bea, via phone ([phone]), to provide education on caregiver role and expectations short term and long term, temporary relocation and risk factors.     Pt nor niece able to identify a caregiver plan at this time. Both expressed further concerns with caregiver role and expectations around temporary relocation.     There are no chemical dependency concerns. Pt denies hx of alcohol or drug use. Pt reports he used to drink socially, but  has not drank for 20yrs. Pt denies smoking or using smokeless tobacco.     Pt denies hx of mental health concerns.     Pt and niece expressed financial concerns as pt currently does not have a supplemental insurance policy.     From a psychosocial perspective, pt is a conditional candidate to proceed with LVAD as he is very unsure he wants this, pt nor family can identify a caregiver plan and pt does not currently have a secondary insurance policy.     Patient understands risks and benefits of Mechanical Circulatory Support: Yes     Recommendation:    conditional    Signature: [facility]     Title: Licensed Independent Clinical                Interview Date: May 7, 2020

## 2020-05-07 NOTE — PLAN OF CARE
6MWT orders received. Pt busy with multiple tests and procedures today, 6MWT unable to be completed. Will reschedule.

## 2020-05-07 NOTE — PROGRESS NOTES
CLINICAL NUTRITION SERVICES - ASSESSMENT NOTE     Nutrition Prescription    RECOMMENDATIONS FOR MDs/PROVIDERS TO ORDER:  If unable to extubate and advance diet within 24-48 hours, place feeding tube and start TFs. See future/additional rec #4 below.     Malnutrition Status:    Unable to determine due to unable to complete NFPA to limit exposure and to minimize use of PPE during COVID-19 pandemic    Recommendations already ordered by Registered Dietitian (RD):  None at this time.     Future/Additional Recommendations:  1. Diet advancement as per team once post-op. Rec chocolate Boost Plus twice daily post-op, when appropriate, if decreased oral intake post-op. If oral intake is decreased initially, rec keep diet as liberalized as medically able (such as a 3 g sodium diet order with a 2 L fluid restriction).   2. Order a multivitamin with minerals to help ensure micronutrient needs are met.   3. Monitor BG control. Hgb A1c of 5.8 on 5/5/20.   4. If TFs are needed, would rec place feeding tube (FT) and initiate TFs, Impact Peptide 1.5 (immune modulating TF, high protein). Initiate TFs at 15 mL/hr, advancing rate by 10 mL Q 8 hrs (or per provider discretion, pending hemodynamic stability) to goal rate of 65 mL/hr. Impact Peptide at goal 65 ml/hr (1560 ml/day) to provide 2340 kcals (27 kcal/kg/day), 147 g PRO (1.7 g/kg/day), 1201 ml free H2O, 100 g Fat (50% from MCTs), 218 g CHO and no Fiber daily.      If begin tube feeds:    - Flush FT with 30 mL water Q 4 hrs for patency. Rec provider adjust free water flushes as needed, pending fluid status.   - Ensure K+/Mg++/Phos labs are ordered daily until TFs advance to goal infusion to evaluate for sx of refeeding with nutrition received. If lytes trend low, aggressively replace lytes.       - If not already ordered, order a multivitamin/mineral (certavite 15 mL/day via FT) to help ensure micronutrient needs being met with suspected hypermetabolic demands and potential  "interruptions to TF infusions.   - Monitor TF and possible need to adjust nutrition support regimen if necessary, pending medical course and nutrition status.       - If Impact Peptide 1.5 TF is started, order a baseline CRP lab and then CRP labs for the subsequent two Mondays.     - Send a nutrition consult for \"Registered Dietitian to Order TF per Medical Nutrition Therapy Guidelines\" if desire RD to order TFs.       REASON FOR ASSESSMENT  Rudi Schilling is a/an 70 year old male assessed by the dietitian for Provider order for \"Heart Failure pre Ventricular Assist Device (VAD) planning, Frailty assessment,\" Anticipate will receive provider consult for nutrition education with comments post op cardiovascular surgery (automatic consult on post-op order set) with CABG planned for 5/8 am.    NUTRITION HISTORY  Pt not known to this service PTA.   Per H & P, \"PMH significant for morbid obesity and recently diagnosed atrial fibrillation, multivessel CAD and biventricular HFrEF (EF 10% on 4/21/2020 TTE) who was transferred from an OSH on 5/5/2020 for further management and evaluation for possible revascularization.\" GERD hx.   Talked to pt over the phone briefly this morning (5/8) as he was busy with RN, providers, and going to surgery soon. His appetite was good PTA. He has not tried oral supplements in the past.     CURRENT NUTRITION ORDERS  Diet: NPO today (5/8) for possible CABG. Previously had been on a 2 g sodium diet or NPO (for tests) since admit on 5/5  Intake/Tolerance: Tolerating diet. Per chart, pt consuming 100% with a good appetite 5/5-5/6 and 75% with a good appetite 5/7. Factors affecting oral intake this admission include upcoming surgery and unknown period of intubation and previously restrictive diet order.     LABS  Labs reviewed    MEDICATIONS  Medications reviewed    ANTHROPOMETRICS  Height: 177.8 cm (5' 10\")  Most Recent Weight: 126.6 kg (279 lb 1.6 oz)    IBW: 75.5 kg   BMI: Obesity Grade III BMI " >40  Weight History: Admit wt of 130 kg (5/5/20). Wt loss this admission due to diuresis. Unable to obtain wt hx PTA.   Dosing Weight: 88 kg (adjusted, based on lowest wt of 126.6 kg on 5/8)    ASSESSED NUTRITION NEEDS  Estimated Energy Needs: 4125-9411 kcals/day (20 - 25 kcals/kg)  Justification: Decreased needs with wt status. Rec the high end of this range  Estimated Protein Needs: 132-176 grams protein/day (1.5-2 grams of pro/kg)  Justification: Increased needs after surgery and with body wt status  Estimated Fluid Needs: 2000 mL/day    Justification: On a fluid restriction    PHYSICAL FINDINGS  See malnutrition section below.  Frailty assessment limited by COVID-19 pandemic    MALNUTRITION  % Intake: No decreased intake noted  % Weight Loss: Unable to assess due to lack of data.  Subcutaneous Fat Loss: Unable to determine due to unable to complete NFPA to limit exposure and to minimize use of PPE during COVID-19 pandemic   Muscle Loss: Unable to determine due to unable to complete NFPA to limit exposure and to minimize use of PPE during COVID-19 pandemic   Fluid Accumulation/Edema: Trace, per chart  Malnutrition Diagnosis:  Unable to determine due to unable to complete NFPA to limit exposure and to minimize use of PPE during COVID-19 pandemic     NUTRITION DIAGNOSIS  Predicted inadequate nutrient intake (protein-energy) related to upcoming surgery and unknown period of intubation and potentially restrictive diet order.    INTERVENTIONS  Implementation  Nutrition Education: Nutrition education to be completed as able/appropriate (as to be s/p CABG). Unable to provide LVAD work-up education as pt was busy with RN, providers, and going to surgery shortly.     Goals  Diet adv v nutrition support within 2-3 days.  Patient to consume % of nutritionally adequate meal trays TID, or the equivalent with supplements/snacks.     Monitoring/Evaluation  Progress toward goals will be monitored and evaluated per  protocol.     Nutrition will continue to follow.      Allison Stover, MS, RD, LD, [facility]   6C Pgr: [phone]    4E RD pgr: [phone]

## 2020-05-07 NOTE — PROGRESS NOTES
Patient able to perform three attempts with good technique.Here are the results for best of three.     FEV1  1.73L  % predicated 52%   FVC   2.34L    52%   FVC1/FVC  74%   100%   PEF   5.15l/s    60%

## 2020-05-07 NOTE — PLAN OF CARE
D: Transferred from an OSH on 5/5/2020 for further management and evaluation for possible revascularization  Hx: morbid obesity and recently diagnosed atrial fibrillation, multivessel CAD and biventricular HFrEF (EF 10%)     I: Monitored vitals and assessed pt status.   Changed:   - NPO since MN  - pt completed pre-procedure shower  Running: Heparin at 1650 units/hr into R PIV  PRN: melatonin x1   Mobility: SBA/indep     A: A0x4. VSS on RA. Tele shows a-fib rate 's. Afebrile. Minimal edema. Some MIMS. Inaccurate output charting overnight as pt missed toilet and urinal several times. BM x2 overnight, has urgency fecal incontinence. Denies pain/N/V/SOB/dizziness. Pt's sister called overnight and was updated on plan of care. Able to make needs known. Slept poorly overnight.      P: Continue to monitor pt status and report changes to Cards 2. CABG tentatively planned for Friday with IABP placement prior.      [id number]  Sunshine Mercer RN on 5/7/2020 at 6:14 AM

## 2020-05-07 NOTE — PROGRESS NOTES
Cardiology Progress Note  5/6/2020      ASSESSMENT/PLAN:  Rudi Schilling is a 70 year old male with a PMH significant for morbid obesity and recently diagnosed atrial fibrillation, multivessel CAD and biventricular HFrEF (EF 10% on 4/21/2020 TTE) who was transferred from an OSH on 5/5/2020 for further management and evaluation for possible revascularization.    Changes Today:  - IABP placement today   - Continue rest of LVAD evaluation   - Continue heparin gtt    # Severe biventricular HFrEF, NYHA CLASS II, CHF STAGE D  # ICM with multivessel CAD  Most recent echo on 4/21/2020 showed EF of 10%, and coronary angiogram on 4/28/2020 revealed multivessel CAD.  Transferred to [facility] for further management and evaluation for revascularization. Patient denies any complaints at this time. He reports mild symptoms of SOB with exertion but denies chest pain or palpitations. He appears euvolemic on exam. TTE from 5/6 showed moderate diffuse hypokinesis LVEF 30-35%  - ACEI/ARB: losartan 100mg daily  - Beta Blocker: 6.25mg BID  - Aldosterone Antagonist: spironolactone 25mg daily  - Diuresis: Appears euvolemic,continue lasix 40mg po daily   - Telemetry  - Strict Is/Os, daily weights, cardiac diet, fluid restriction (<1.5L daily)  - CVTS consulted, appreciate recs   - Plan for OR on Friday for CABG, IABP placement today      # atrial fibrillation  CHADSVASC of 3 (age, vascular disease, and CHF).  Rates of 90s on admission.  - telemetry  - continue carvedilol 6.25mg BID and digoxin 125mcg daily  - heparin gtt for anticoagulation, holding PTA michelleis     # Positive BCx w/ staph hominis, suspect contamination  Patient was noted to spike a fever on 4/29 at the OSH and was started on vancomycin/ceftriaxone after 1 of 2 blood cultures initially grew GPCs which eventually grew out staph hominis. Repeat BCx of 5/04 were negative for >24hours at time of discharge per chart review and antibiotics were discontinued. COVID negative  - repeat  "blood cultures at our facility NGTD   - will hold off on antibiotics at this time and monitor for fevers     CHRONIC MEDICAL PROBLEMS  # Chronic back pain: continue PTA cyclobenzaprine PRN  # GERD: continue PTA pantoprazole    FEN:Cardiac Diet  Ppx: Heparin gtt  Code: Full  Dispo: Admit to cardiology    Plan d/w Dr. Odin M.D., who is in agreement. Please, see staff addendum for changes/additions to the plan.    Radha Carpio  Cardiology PGY4        ===================================================================    SUBJECTIVE: CATHERINE o/n. VSS. RN notes reviewed. Feels well this morning. No issues reported today     Nursing notes reviewed.    OBJECTIVE:  BP (!) 127/93 (BP Location: Right arm)   Pulse 90   Temp 99.1  F (37.3  C) (Oral)   Resp 16   Ht 1.778 m (5' 10\")   Wt 129.3 kg (285 lb 0.9 oz)   SpO2 93%   BMI 40.90 kg/m    Temp (24hrs), Av.6  F (37  C), Min:98.2  F (36.8  C), Max:98.9  F (37.2  C)      I/O:    Intake/Output Summary (Last 24 hours) at 2020 1206  Last data filed at 2020 0659  Gross per 24 hour   Intake 1222.65 ml   Output 1800 ml   Net -577.35 ml       Wt:   Wt Readings from Last 5 Encounters:   20 129.3 kg (285 lb 0.9 oz)       GEN: pleasant, no acute distress  HEENT: no icterus  CV: RRR, normal s1/s2, no murmurs/rubs/s3/s4, no heave. Unable to assess JVP.   CHEST: clear to ausculation bilaterally, no rales or wheezing  ABD: soft, obese, non-tender, normal active bowel sounds  EXTR: Trace edema bilaterally  NEURO: alert oriented, speech fluent/appropriate, motor grossly nonfocal    Labs: reviewed in EMR  CBC  Recent Labs   Lab 20  0407 20  2134 20  1823   WBC 8.7 8.4 9.1 9.6   RBC 4.37* 4.25* 4.49 4.40   HGB 12.6* 12.2* 12.8* 12.8*   HCT 41.6 41.5 43.4 42.0   MCV 95 98 97 96   MCH 28.8 28.7 28.5 29.1   MCHC 30.3* 29.4* 29.5* 30.5*   RDW 14.3 14.3 14.2 14.3    278 244 247     BMP  Recent Labs   Lab 20 " 05/05/20  1823    136 138   POTASSIUM 4.4 4.8 4.8   CHLORIDE 102 103 102   CO2 29 31 31   ANIONGAP 7 3 5   * 108* 121*   BUN 16 16 15   CR 0.86 1.00 0.82   GFRESTIMATED 89 77 >90   GFRESTBLACK >90 89 >90   SIDRA 9.3 9.3 9.3   MAG 2.1 2.1 1.9   PHOS 3.6 3.6  --      Troponins:   No results found for: TROPI, TROPONIN, TROPR, TROPN  INR  Recent Labs   Lab 05/07/20  0612 05/06/20  0407   INR 1.20* 1.13       Imaging: reviewed in EMR.    I have reviewed today's vital signs, notes, medications, labs and imaging.  I have also seen and examined the patient and agree with the findings and plan as outlined above.  Pt without complaints.  98.2, HR 93, RR 16 and /71 with a. Fib.  UO 1550; + hemeturia.  Lungs clear and distant heart sounds.  Labs with Cr 1.0 and WBC 8.4.  EF 35% on losartan, coreg, digoxin, aldactone, ASA and lasix.  Assessment: Pt with 3V dz and moderate LV dysfn.  Will continue with LVAD education, IABP later today and surgical revascularization tomorrow.     Tapan Newby MD, PhD  Professor, Heart Failure and Cardiac Transplantation  [facility]

## 2020-05-07 NOTE — PROGRESS NOTES
Met with patient and talked to sister, Isis, via phone (due to COVID 19 visitor restrictions) to present the [facility] and HM3 VAD(s) as possible treatment options.     Discussed VAD evaluation, explained what a VAD is, discussed caregiver responsibilities, discussed staying local for 30 days post discharge, discussed the surgery.  Patient had stated several times that he was not interested in a VAD during our conversation. Writer explained that we want to educate him about his options so he can make an informed decision. Writer continued discussing VADs, but patient stated again that it's not something he would want and he also would not want to temporarily stay local after discharge. Patient's sister, Isis, stated that patient is not interested and requested that writer stops talking.  Writer updated Charmaine Newby and Jewel. Dr. Holloway stated that he will talk to patient and family and will update VAD team about patient's decision to stop or continue with eval.

## 2020-05-08 ENCOUNTER — APPOINTMENT (OUTPATIENT)
Dept: GENERAL RADIOLOGY | Facility: CLINIC | Age: 70
DRG: 234 | End: 2020-05-08
Attending: ANESTHESIOLOGY
Payer: MEDICARE

## 2020-05-08 ENCOUNTER — APPOINTMENT (OUTPATIENT)
Dept: GENERAL RADIOLOGY | Facility: CLINIC | Age: 70
DRG: 234 | End: 2020-05-08
Attending: SURGERY
Payer: MEDICARE

## 2020-05-08 ENCOUNTER — ANESTHESIA (OUTPATIENT)
Dept: SURGERY | Facility: CLINIC | Age: 70
DRG: 234 | End: 2020-05-08
Payer: MEDICARE

## 2020-05-08 ENCOUNTER — APPOINTMENT (OUTPATIENT)
Dept: GENERAL RADIOLOGY | Facility: CLINIC | Age: 70
DRG: 234 | End: 2020-05-08
Attending: THORACIC SURGERY (CARDIOTHORACIC VASCULAR SURGERY)
Payer: MEDICARE

## 2020-05-08 LAB
ABO + RH BLD: NORMAL
ABO + RH BLD: NORMAL
ACETAMINOPHEN QUAL: NEGATIVE
ALBUMIN SERPL-MCNC: 2.7 G/DL (ref 3.4–5)
ALBUMIN SERPL-MCNC: 3.1 G/DL (ref 3.4–5)
ALBUMIN UR-MCNC: 10 MG/DL
ALP SERPL-CCNC: 40 U/L (ref 40–150)
ALP SERPL-CCNC: 46 U/L (ref 40–150)
ALT SERPL W P-5'-P-CCNC: 27 U/L (ref 0–70)
ALT SERPL W P-5'-P-CCNC: 32 U/L (ref 0–70)
AMANTADINE: NEGATIVE
AMITRIPTYLINE QUAL: NEGATIVE
AMOXAPINE: NEGATIVE
AMPHETAMINES QUAL: NEGATIVE
ANGLE RATE OF CLOT GROWTH: 71 DEG (ref 59–74)
ANGLE RATE OF CLOT GROWTH: 77.9 DEG (ref 59–74)
ANION GAP SERPL CALCULATED.3IONS-SCNC: 5 MMOL/L (ref 3–14)
ANION GAP SERPL CALCULATED.3IONS-SCNC: 8 MMOL/L (ref 3–14)
APPEARANCE UR: CLEAR
APTT PPP: 32 SEC (ref 22–37)
APTT PPP: 34 SEC (ref 22–37)
AST SERPL W P-5'-P-CCNC: 27 U/L (ref 0–45)
AST SERPL W P-5'-P-CCNC: 42 U/L (ref 0–45)
ATROPINE: NEGATIVE
BASE EXCESS BLDA CALC-SCNC: 0.8 MMOL/L
BASE EXCESS BLDA CALC-SCNC: 1.6 MMOL/L
BASE EXCESS BLDA CALC-SCNC: 1.6 MMOL/L
BASE EXCESS BLDA CALC-SCNC: 3 MMOL/L
BASE EXCESS BLDA CALC-SCNC: 4 MMOL/L
BASE EXCESS BLDA CALC-SCNC: 4.5 MMOL/L
BASE EXCESS BLDV CALC-SCNC: 1.3 MMOL/L
BASE EXCESS BLDV CALC-SCNC: 2.9 MMOL/L
BASE EXCESS BLDV CALC-SCNC: 3 MMOL/L
BASE EXCESS BLDV CALC-SCNC: 4.3 MMOL/L
BASE EXCESS BLDV CALC-SCNC: 5.3 MMOL/L
BASE EXCESS BLDV CALC-SCNC: 6.1 MMOL/L
BASE EXCESS BLDV CALC-SCNC: 6.3 MMOL/L
BENZODIAZ UR QL: NEGATIVE
BILIRUB SERPL-MCNC: 0.7 MG/DL (ref 0.2–1.3)
BILIRUB SERPL-MCNC: 1 MG/DL (ref 0.2–1.3)
BILIRUB UR QL STRIP: NEGATIVE
BLD GP AB SCN SERPL QL: NORMAL
BLD PROD TYP BPU: NORMAL
BLD UNIT ID BPU: 0
BLOOD BANK CMNT PATIENT-IMP: NORMAL
BLOOD PRODUCT CODE: NORMAL
BPU ID: NORMAL
BUN SERPL-MCNC: 16 MG/DL (ref 7–30)
BUN SERPL-MCNC: 20 MG/DL (ref 7–30)
BUPROPION QUAL: NEGATIVE
CA-I BLD-MCNC: 4.2 MG/DL (ref 4.4–5.2)
CA-I BLD-MCNC: 4.3 MG/DL (ref 4.4–5.2)
CA-I BLD-MCNC: 4.3 MG/DL (ref 4.4–5.2)
CA-I BLD-MCNC: 4.4 MG/DL (ref 4.4–5.2)
CA-I BLD-MCNC: 4.7 MG/DL (ref 4.4–5.2)
CA-I BLD-MCNC: 4.8 MG/DL (ref 4.4–5.2)
CA-I BLD-MCNC: 4.9 MG/DL (ref 4.4–5.2)
CA-I BLD-MCNC: 5.1 MG/DL (ref 4.4–5.2)
CAFFEINE QUAL: NEGATIVE
CALCIUM SERPL-MCNC: 8.6 MG/DL (ref 8.5–10.1)
CALCIUM SERPL-MCNC: 9.2 MG/DL (ref 8.5–10.1)
CANNABINOIDS UR QL SCN: NEGATIVE
CARBAMAZEPINE QUAL: NEGATIVE
CHLORIDE SERPL-SCNC: 102 MMOL/L (ref 94–109)
CHLORIDE SERPL-SCNC: 106 MMOL/L (ref 94–109)
CHLORPHENIRAMINE: NEGATIVE
CHLORPROMAZINE: NEGATIVE
CI HYPERCOAGULATION INDEX: 0.6 RATIO (ref 0–3)
CI HYPERCOAGULATION INDEX: 4.5 RATIO (ref 0–3)
CITALOPRAM QUAL: NEGATIVE
CLOMIPRAMINE QUAL: NEGATIVE
CLOT LYSIS 30M P MA LENFR BLD TEG: 0 % (ref 0–8)
CLOT LYSIS 30M P MA LENFR BLD TEG: 1 % (ref 0–8)
CLOT STRENGTH BLD TEG: 15.3 KD/SC (ref 5.3–13.2)
CLOT STRENGTH BLD TEG: 21.9 KD/SC (ref 5.3–13.2)
CO2 SERPL-SCNC: 27 MMOL/L (ref 20–32)
CO2 SERPL-SCNC: 30 MMOL/L (ref 20–32)
COCAINE QUAL: NEGATIVE
COCAINE UR QL: NEGATIVE
CODEINE QUAL: NEGATIVE
COLOR UR AUTO: YELLOW
CREAT SERPL-MCNC: 0.94 MG/DL (ref 0.66–1.25)
CREAT SERPL-MCNC: 1.04 MG/DL (ref 0.66–1.25)
DESIPRAMINE QUAL: NEGATIVE
DEXTROMETHORPHAN: NEGATIVE
DIPHENHYDRAMINE: NEGATIVE
DOXEPIN/METABOLITE: NEGATIVE
DOXYLAMINE: NEGATIVE
EPHEDRINE OR PSEUDO: NEGATIVE
ERYTHROCYTE [DISTWIDTH] IN BLOOD BY AUTOMATED COUNT: 14.1 % (ref 10–15)
ERYTHROCYTE [DISTWIDTH] IN BLOOD BY AUTOMATED COUNT: 14.1 % (ref 10–15)
FENTANYL QUAL: NEGATIVE
FIBRINOGEN PPP-MCNC: 507 MG/DL (ref 200–420)
FLUOXETINE AND METAB: NEGATIVE
GFR SERPL CREATININE-BSD FRML MDRD: 73 ML/MIN/{1.73_M2}
GFR SERPL CREATININE-BSD FRML MDRD: 83 ML/MIN/{1.73_M2}
GLUCOSE BLD-MCNC: 134 MG/DL (ref 70–99)
GLUCOSE BLD-MCNC: 148 MG/DL (ref 70–99)
GLUCOSE BLD-MCNC: 164 MG/DL (ref 70–99)
GLUCOSE BLD-MCNC: 187 MG/DL (ref 70–99)
GLUCOSE BLD-MCNC: 190 MG/DL (ref 70–99)
GLUCOSE BLD-MCNC: 197 MG/DL (ref 70–99)
GLUCOSE BLD-MCNC: 207 MG/DL (ref 70–99)
GLUCOSE BLDC GLUCOMTR-MCNC: 131 MG/DL (ref 70–99)
GLUCOSE BLDC GLUCOMTR-MCNC: 134 MG/DL (ref 70–99)
GLUCOSE BLDC GLUCOMTR-MCNC: 138 MG/DL (ref 70–99)
GLUCOSE BLDC GLUCOMTR-MCNC: 158 MG/DL (ref 70–99)
GLUCOSE BLDC GLUCOMTR-MCNC: 181 MG/DL (ref 70–99)
GLUCOSE SERPL-MCNC: 122 MG/DL (ref 70–99)
GLUCOSE SERPL-MCNC: 190 MG/DL (ref 70–99)
GLUCOSE UR STRIP-MCNC: NEGATIVE MG/DL
HCO3 BLD-SCNC: 27 MMOL/L (ref 21–28)
HCO3 BLD-SCNC: 29 MMOL/L (ref 21–28)
HCO3 BLD-SCNC: 29 MMOL/L (ref 21–28)
HCO3 BLD-SCNC: 30 MMOL/L (ref 21–28)
HCO3 BLDV-SCNC: 28 MMOL/L (ref 21–28)
HCO3 BLDV-SCNC: 29 MMOL/L (ref 21–28)
HCO3 BLDV-SCNC: 29 MMOL/L (ref 21–28)
HCO3 BLDV-SCNC: 31 MMOL/L (ref 21–28)
HCO3 BLDV-SCNC: 32 MMOL/L (ref 21–28)
HCO3 BLDV-SCNC: 33 MMOL/L (ref 21–28)
HCO3 BLDV-SCNC: 34 MMOL/L (ref 21–28)
HCT VFR BLD AUTO: 40.8 % (ref 40–53)
HCT VFR BLD AUTO: 43.1 % (ref 40–53)
HGB BLD-MCNC: 10.5 G/DL (ref 13.3–17.7)
HGB BLD-MCNC: 10.6 G/DL (ref 13.3–17.7)
HGB BLD-MCNC: 10.9 G/DL (ref 13.3–17.7)
HGB BLD-MCNC: 11.1 G/DL (ref 13.3–17.7)
HGB BLD-MCNC: 11.3 G/DL (ref 13.3–17.7)
HGB BLD-MCNC: 12.1 G/DL (ref 13.3–17.7)
HGB BLD-MCNC: 12.7 G/DL (ref 13.3–17.7)
HGB BLD-MCNC: 13.1 G/DL (ref 13.3–17.7)
HGB BLD-MCNC: 13.3 G/DL (ref 13.3–17.7)
HGB UR QL STRIP: ABNORMAL
HYDROCODONE QUAL: NEGATIVE
HYDROMORPHONE QUAL: NEGATIVE
IBUPROFEN QUAL: NEGATIVE
IMIPRAMINE QUAL: NEGATIVE
INR PPP: 1.41 (ref 0.86–1.14)
INR PPP: 1.85 (ref 0.86–1.14)
K TIME TO SPEC CLOT STRENGTH: 0.9 MIN (ref 1–3)
K TIME TO SPEC CLOT STRENGTH: 1.4 MIN (ref 1–3)
KETAMINE QUAL: NEGATIVE
KETONES UR STRIP-MCNC: NEGATIVE MG/DL
LA PPP-IMP: NEGATIVE
LACTATE BLD-SCNC: 0.6 MMOL/L (ref 0.7–2)
LACTATE BLD-SCNC: 0.8 MMOL/L (ref 0.7–2)
LACTATE BLD-SCNC: 1 MMOL/L (ref 0.7–2)
LACTATE BLD-SCNC: 1.4 MMOL/L (ref 0.7–2)
LACTATE BLD-SCNC: 1.6 MMOL/L (ref 0.7–2)
LACTATE BLD-SCNC: 2 MMOL/L (ref 0.7–2)
LAMOTRIGINE QUAL: NEGATIVE
LEUKOCYTE ESTERASE UR QL STRIP: NEGATIVE
LIDOCAIN SPEC QL: NEGATIVE
LMWH PPP CHRO-ACNC: 0.25 IU/ML
LMWH PPP CHRO-ACNC: <0.1 IU/ML
LOXAPINE: NEGATIVE
LY60 LYSIS AT 60 MINUTES: 1.9 % (ref 0–15)
LY60 LYSIS AT 60 MINUTES: 3.3 % (ref 0–15)
MA MAXIMUM CLOT STRENGTH: 75.4 MM (ref 55–74)
MA MAXIMUM CLOT STRENGTH: 81.4 MM (ref 55–74)
MAGNESIUM SERPL-MCNC: 2.7 MG/DL (ref 1.6–2.3)
MAPROTYLINE: NEGATIVE
MCH RBC QN AUTO: 28.7 PG (ref 26.5–33)
MCH RBC QN AUTO: 28.8 PG (ref 26.5–33)
MCHC RBC AUTO-ENTMCNC: 30.4 G/DL (ref 31.5–36.5)
MCHC RBC AUTO-ENTMCNC: 31.1 G/DL (ref 31.5–36.5)
MCV RBC AUTO: 93 FL (ref 78–100)
MCV RBC AUTO: 94 FL (ref 78–100)
MDMA QUAL: NEGATIVE
MEPERIDINE QUAL: NEGATIVE
METHAMPHETAMINE: NEGATIVE
METHODONE QUAL: NEGATIVE
MIRTAZAPINE QUAL: NEGATIVE
MORPHINE QUAL: NEGATIVE
MUCOUS THREADS #/AREA URNS LPF: PRESENT /LPF
NICOTINE: NEGATIVE
NITRATE UR QL: NEGATIVE
NORTRIPTYLINE QUAL: NEGATIVE
NUM BPU REQUESTED: 2
NUM BPU REQUESTED: 4
O2/TOTAL GAS SETTING VFR VENT: 100 %
O2/TOTAL GAS SETTING VFR VENT: 21 %
O2/TOTAL GAS SETTING VFR VENT: 40 %
O2/TOTAL GAS SETTING VFR VENT: 50 %
O2/TOTAL GAS SETTING VFR VENT: 80 %
O2/TOTAL GAS SETTING VFR VENT: ABNORMAL %
OLANZAPINE QUAL: NEGATIVE
OPIATES UR QL SCN: NEGATIVE
OXYCODONE QUAL: NEGATIVE
OXYHGB MFR BLD: 95 % (ref 92–100)
OXYHGB MFR BLDV: 57 %
OXYHGB MFR BLDV: 63 %
OXYHGB MFR BLDV: 63 %
OXYHGB MFR BLDV: 65 %
OXYHGB MFR BLDV: 70 %
PCO2 BLD: 43 MM HG (ref 35–45)
PCO2 BLD: 43 MM HG (ref 35–45)
PCO2 BLD: 44 MM HG (ref 35–45)
PCO2 BLD: 45 MM HG (ref 35–45)
PCO2 BLD: 51 MM HG (ref 35–45)
PCO2 BLD: 52 MM HG (ref 35–45)
PCO2 BLDV: 49 MM HG (ref 40–50)
PCO2 BLDV: 49 MM HG (ref 40–50)
PCO2 BLDV: 50 MM HG (ref 40–50)
PCO2 BLDV: 53 MM HG (ref 40–50)
PCO2 BLDV: 54 MM HG (ref 40–50)
PCO2 BLDV: 59 MM HG (ref 40–50)
PCO2 BLDV: 60 MM HG (ref 40–50)
PENTAZOCINE: NEGATIVE
PH BLD: 7.36 PH (ref 7.35–7.45)
PH BLD: 7.38 PH (ref 7.35–7.45)
PH BLD: 7.38 PH (ref 7.35–7.45)
PH BLD: 7.4 PH (ref 7.35–7.45)
PH BLD: 7.4 PH (ref 7.35–7.45)
PH BLD: 7.43 PH (ref 7.35–7.45)
PH BLDV: 7.35 PH (ref 7.32–7.43)
PH BLDV: 7.36 PH (ref 7.32–7.43)
PH BLDV: 7.37 PH (ref 7.32–7.43)
PH BLDV: 7.38 PH (ref 7.32–7.43)
PH BLDV: 7.39 PH (ref 7.32–7.43)
PH UR STRIP: 5 PH (ref 5–7)
PHENCYCLIDINE QUAL: NEGATIVE
PHENTERMINE: NEGATIVE
PHOSPHATE SERPL-MCNC: 3.1 MG/DL (ref 2.5–4.5)
PLATELET # BLD AUTO: 169 10E9/L (ref 150–450)
PLATELET # BLD AUTO: 219 10E9/L (ref 150–450)
PLATELET # BLD AUTO: 264 10E9/L (ref 150–450)
PO2 BLD: 103 MM HG (ref 80–105)
PO2 BLD: 188 MM HG (ref 80–105)
PO2 BLD: 239 MM HG (ref 80–105)
PO2 BLD: 279 MM HG (ref 80–105)
PO2 BLD: 360 MM HG (ref 80–105)
PO2 BLD: 82 MM HG (ref 80–105)
PO2 BLDV: 33 MM HG (ref 25–47)
PO2 BLDV: 36 MM HG (ref 25–47)
PO2 BLDV: 36 MM HG (ref 25–47)
PO2 BLDV: 37 MM HG (ref 25–47)
PO2 BLDV: 40 MM HG (ref 25–47)
PO2 BLDV: 42 MM HG (ref 25–47)
PO2 BLDV: 52 MM HG (ref 25–47)
POTASSIUM BLD-SCNC: 4.2 MMOL/L (ref 3.4–5.3)
POTASSIUM BLD-SCNC: 5 MMOL/L (ref 3.4–5.3)
POTASSIUM BLD-SCNC: 5.2 MMOL/L (ref 3.4–5.3)
POTASSIUM BLD-SCNC: 5.3 MMOL/L (ref 3.4–5.3)
POTASSIUM BLD-SCNC: 5.8 MMOL/L (ref 3.4–5.3)
POTASSIUM BLD-SCNC: 5.8 MMOL/L (ref 3.4–5.3)
POTASSIUM BLD-SCNC: 5.9 MMOL/L (ref 3.4–5.3)
POTASSIUM SERPL-SCNC: 4.6 MMOL/L (ref 3.4–5.3)
POTASSIUM SERPL-SCNC: 4.6 MMOL/L (ref 3.4–5.3)
POTASSIUM SERPL-SCNC: 4.7 MMOL/L (ref 3.4–5.3)
PREALB SERPL IA-MCNC: 16 MG/DL (ref 15–45)
PROPOFOL QUAL: NEGATIVE
PROPOXPHENE QUAL: NEGATIVE
PROPRANOLOL QUAL: NEGATIVE
PROT SERPL-MCNC: 6.2 G/DL (ref 6.8–8.8)
PROT SERPL-MCNC: 7.3 G/DL (ref 6.8–8.8)
PYRILAMINE: NEGATIVE
QUETIAPINE METAB QUAL: NEGATIVE
R TIME UNTIL CLOT FORMS: 4.9 MIN (ref 4–9)
R TIME UNTIL CLOT FORMS: 8.7 MIN (ref 4–9)
RBC # BLD AUTO: 4.41 10E12/L (ref 4.4–5.9)
RBC # BLD AUTO: 4.57 10E12/L (ref 4.4–5.9)
RBC #/AREA URNS AUTO: 3 /HPF (ref 0–2)
SALICYLATE QUAL: NEGATIVE
SERTRALINE QUAL: NEGATIVE
SODIUM BLD-SCNC: 138 MMOL/L (ref 133–144)
SODIUM BLD-SCNC: 139 MMOL/L (ref 133–144)
SODIUM BLD-SCNC: 140 MMOL/L (ref 133–144)
SODIUM BLD-SCNC: 140 MMOL/L (ref 133–144)
SODIUM BLD-SCNC: 141 MMOL/L (ref 133–144)
SODIUM SERPL-SCNC: 137 MMOL/L (ref 133–144)
SODIUM SERPL-SCNC: 140 MMOL/L (ref 133–144)
SOURCE: ABNORMAL
SP GR UR STRIP: 1.03 (ref 1–1.03)
SPECIMEN EXP DATE BLD: NORMAL
THEOBROMINE: POSITIVE
TOPIRAMATE QUAL: NEGATIVE
TRAMADOL QUAL: NEGATIVE
TRANSFUSION STATUS PATIENT QL: NORMAL
TRIMIPRAMINE QUAL: NEGATIVE
UROBILINOGEN UR STRIP-MCNC: NORMAL MG/DL (ref 0–2)
VENLAFAXINE QUAL: NEGATIVE
WBC # BLD AUTO: 19.5 10E9/L (ref 4–11)
WBC # BLD AUTO: 9.5 10E9/L (ref 4–11)
WBC #/AREA URNS AUTO: 4 /HPF (ref 0–5)

## 2020-05-08 PROCEDURE — 80053 COMPREHEN METABOLIC PANEL: CPT | Performed by: SURGERY

## 2020-05-08 PROCEDURE — 85027 COMPLETE CBC AUTOMATED: CPT | Performed by: INTERNAL MEDICINE

## 2020-05-08 PROCEDURE — 84132 ASSAY OF SERUM POTASSIUM: CPT | Performed by: SURGERY

## 2020-05-08 PROCEDURE — 82947 ASSAY GLUCOSE BLOOD QUANT: CPT

## 2020-05-08 PROCEDURE — 25800030 ZZH RX IP 258 OP 636: Performed by: NURSE ANESTHETIST, CERTIFIED REGISTERED

## 2020-05-08 PROCEDURE — 40000048 ZZH STATISTIC DAILY SWAN MONITORING

## 2020-05-08 PROCEDURE — 85730 THROMBOPLASTIN TIME PARTIAL: CPT | Performed by: INTERNAL MEDICINE

## 2020-05-08 PROCEDURE — 93005 ELECTROCARDIOGRAM TRACING: CPT

## 2020-05-08 PROCEDURE — 25000565 ZZH ISOFLURANE, EA 15 MIN: Performed by: THORACIC SURGERY (CARDIOTHORACIC VASCULAR SURGERY)

## 2020-05-08 PROCEDURE — 83735 ASSAY OF MAGNESIUM: CPT | Performed by: SURGERY

## 2020-05-08 PROCEDURE — 82805 BLOOD GASES W/O2 SATURATION: CPT | Performed by: STUDENT IN AN ORGANIZED HEALTH CARE EDUCATION/TRAINING PROGRAM

## 2020-05-08 PROCEDURE — 84132 ASSAY OF SERUM POTASSIUM: CPT

## 2020-05-08 PROCEDURE — 83605 ASSAY OF LACTIC ACID: CPT | Performed by: SURGERY

## 2020-05-08 PROCEDURE — 25000128 H RX IP 250 OP 636: Performed by: SURGERY

## 2020-05-08 PROCEDURE — 25000128 H RX IP 250 OP 636: Performed by: NURSE ANESTHETIST, CERTIFIED REGISTERED

## 2020-05-08 PROCEDURE — 25000132 ZZH RX MED GY IP 250 OP 250 PS 637: Mod: GY | Performed by: STUDENT IN AN ORGANIZED HEALTH CARE EDUCATION/TRAINING PROGRAM

## 2020-05-08 PROCEDURE — 41000019 ZZH PERA-PERFUSION EACH ADDTL 15 MIN: Performed by: THORACIC SURGERY (CARDIOTHORACIC VASCULAR SURGERY)

## 2020-05-08 PROCEDURE — 93010 ELECTROCARDIOGRAM REPORT: CPT | Mod: 59 | Performed by: INTERNAL MEDICINE

## 2020-05-08 PROCEDURE — 021109W BYPASS CORONARY ARTERY, TWO ARTERIES FROM AORTA WITH AUTOLOGOUS VENOUS TISSUE, OPEN APPROACH: ICD-10-PCS | Performed by: THORACIC SURGERY (CARDIOTHORACIC VASCULAR SURGERY)

## 2020-05-08 PROCEDURE — 25000125 ZZHC RX 250: Performed by: NURSE ANESTHETIST, CERTIFIED REGISTERED

## 2020-05-08 PROCEDURE — 80053 COMPREHEN METABOLIC PANEL: CPT | Performed by: INTERNAL MEDICINE

## 2020-05-08 PROCEDURE — 00000146 ZZHCL STATISTIC GLUCOSE BY METER IP

## 2020-05-08 PROCEDURE — 5A1221Z PERFORMANCE OF CARDIAC OUTPUT, CONTINUOUS: ICD-10-PCS | Performed by: THORACIC SURGERY (CARDIOTHORACIC VASCULAR SURGERY)

## 2020-05-08 PROCEDURE — 82330 ASSAY OF CALCIUM: CPT | Performed by: SURGERY

## 2020-05-08 PROCEDURE — 25000128 H RX IP 250 OP 636: Performed by: ANESTHESIOLOGY

## 2020-05-08 PROCEDURE — 36000074 ZZH SURGERY LEVEL 6 1ST 30 MIN - UMMC: Performed by: THORACIC SURGERY (CARDIOTHORACIC VASCULAR SURGERY)

## 2020-05-08 PROCEDURE — 99291 CRITICAL CARE FIRST HOUR: CPT | Mod: 25 | Performed by: INTERNAL MEDICINE

## 2020-05-08 PROCEDURE — 27210794 ZZH OR GENERAL SUPPLY STERILE: Performed by: THORACIC SURGERY (CARDIOTHORACIC VASCULAR SURGERY)

## 2020-05-08 PROCEDURE — 82803 BLOOD GASES ANY COMBINATION: CPT

## 2020-05-08 PROCEDURE — 82330 ASSAY OF CALCIUM: CPT

## 2020-05-08 PROCEDURE — 25800030 ZZH RX IP 258 OP 636: Performed by: ANESTHESIOLOGY

## 2020-05-08 PROCEDURE — 25000128 H RX IP 250 OP 636

## 2020-05-08 PROCEDURE — 85384 FIBRINOGEN ACTIVITY: CPT | Performed by: INTERNAL MEDICINE

## 2020-05-08 PROCEDURE — 85027 COMPLETE CBC AUTOMATED: CPT | Performed by: SURGERY

## 2020-05-08 PROCEDURE — 85396 CLOTTING ASSAY WHOLE BLOOD: CPT | Performed by: INTERNAL MEDICINE

## 2020-05-08 PROCEDURE — 40000281 ZZH STATISTIC TRANSPORT TIME EA 15 MIN

## 2020-05-08 PROCEDURE — 85730 THROMBOPLASTIN TIME PARTIAL: CPT | Performed by: SURGERY

## 2020-05-08 PROCEDURE — 37000009 ZZH ANESTHESIA TECHNICAL FEE, EACH ADDTL 15 MIN: Performed by: THORACIC SURGERY (CARDIOTHORACIC VASCULAR SURGERY)

## 2020-05-08 PROCEDURE — 41000018 ZZH PER-PERFUSION 1ST 30 MIN: Performed by: THORACIC SURGERY (CARDIOTHORACIC VASCULAR SURGERY)

## 2020-05-08 PROCEDURE — 25000125 ZZHC RX 250: Performed by: THORACIC SURGERY (CARDIOTHORACIC VASCULAR SURGERY)

## 2020-05-08 PROCEDURE — 25000125 ZZHC RX 250: Performed by: ANESTHESIOLOGY

## 2020-05-08 PROCEDURE — 27210995 ZZH RX 272: Performed by: THORACIC SURGERY (CARDIOTHORACIC VASCULAR SURGERY)

## 2020-05-08 PROCEDURE — 25000131 ZZH RX MED GY IP 250 OP 636 PS 637: Mod: GY | Performed by: THORACIC SURGERY (CARDIOTHORACIC VASCULAR SURGERY)

## 2020-05-08 PROCEDURE — 40000196 ZZH STATISTIC RAPCV CVP MONITORING

## 2020-05-08 PROCEDURE — 27810169 ZZH OR IMPLANT GENERAL: Performed by: THORACIC SURGERY (CARDIOTHORACIC VASCULAR SURGERY)

## 2020-05-08 PROCEDURE — 40000344 ZZHCL STATISTIC THAWING COMPONENT: Performed by: INTERNAL MEDICINE

## 2020-05-08 PROCEDURE — 25800030 ZZH RX IP 258 OP 636: Performed by: THORACIC SURGERY (CARDIOTHORACIC VASCULAR SURGERY)

## 2020-05-08 PROCEDURE — 25000125 ZZHC RX 250

## 2020-05-08 PROCEDURE — 27210460 ZZH PUMP APP ADULT PERFUSION: Performed by: THORACIC SURGERY (CARDIOTHORACIC VASCULAR SURGERY)

## 2020-05-08 PROCEDURE — 94002 VENT MGMT INPAT INIT DAY: CPT

## 2020-05-08 PROCEDURE — 71045 X-RAY EXAM CHEST 1 VIEW: CPT

## 2020-05-08 PROCEDURE — C9113 INJ PANTOPRAZOLE SODIUM, VIA: HCPCS | Performed by: SURGERY

## 2020-05-08 PROCEDURE — 25000132 ZZH RX MED GY IP 250 OP 250 PS 637: Mod: GY | Performed by: SURGERY

## 2020-05-08 PROCEDURE — 84295 ASSAY OF SERUM SODIUM: CPT

## 2020-05-08 PROCEDURE — 83605 ASSAY OF LACTIC ACID: CPT

## 2020-05-08 PROCEDURE — 85610 PROTHROMBIN TIME: CPT | Performed by: SURGERY

## 2020-05-08 PROCEDURE — 40000986 XR ABDOMEN PORT 1 VW

## 2020-05-08 PROCEDURE — 40000014 ZZH STATISTIC ARTERIAL MONITORING DAILY

## 2020-05-08 PROCEDURE — 27210447 ZZH PACK CELL SAVER CSP: Performed by: THORACIC SURGERY (CARDIOTHORACIC VASCULAR SURGERY)

## 2020-05-08 PROCEDURE — 81001 URINALYSIS AUTO W/SCOPE: CPT | Performed by: STUDENT IN AN ORGANIZED HEALTH CARE EDUCATION/TRAINING PROGRAM

## 2020-05-08 PROCEDURE — 02L70CK OCCLUSION OF LEFT ATRIAL APPENDAGE WITH EXTRALUMINAL DEVICE, OPEN APPROACH: ICD-10-PCS | Performed by: THORACIC SURGERY (CARDIOTHORACIC VASCULAR SURGERY)

## 2020-05-08 PROCEDURE — 40000076 ZZH STATISTIC IABP MONITORING

## 2020-05-08 PROCEDURE — 85520 HEPARIN ASSAY: CPT | Performed by: INTERNAL MEDICINE

## 2020-05-08 PROCEDURE — 85049 AUTOMATED PLATELET COUNT: CPT | Performed by: INTERNAL MEDICINE

## 2020-05-08 PROCEDURE — P9016 RBC LEUKOCYTES REDUCED: HCPCS | Performed by: PHYSICIAN ASSISTANT

## 2020-05-08 PROCEDURE — 84100 ASSAY OF PHOSPHORUS: CPT | Performed by: SURGERY

## 2020-05-08 PROCEDURE — 33968 REMOVE AORTIC ASSIST DEVICE: CPT

## 2020-05-08 PROCEDURE — 25000128 H RX IP 250 OP 636: Performed by: PHYSICIAN ASSISTANT

## 2020-05-08 PROCEDURE — 02100Z9 BYPASS CORONARY ARTERY, ONE ARTERY FROM LEFT INTERNAL MAMMARY, OPEN APPROACH: ICD-10-PCS | Performed by: THORACIC SURGERY (CARDIOTHORACIC VASCULAR SURGERY)

## 2020-05-08 PROCEDURE — 82805 BLOOD GASES W/O2 SATURATION: CPT | Performed by: SURGERY

## 2020-05-08 PROCEDURE — 20000004 ZZH R&B ICU UMMC

## 2020-05-08 PROCEDURE — 85610 PROTHROMBIN TIME: CPT | Performed by: INTERNAL MEDICINE

## 2020-05-08 PROCEDURE — 40000275 ZZH STATISTIC RCP TIME EA 10 MIN

## 2020-05-08 PROCEDURE — 36000076 ZZH SURGERY LEVEL 6 EA 15 ADDTL MIN - UMMC: Performed by: THORACIC SURGERY (CARDIOTHORACIC VASCULAR SURGERY)

## 2020-05-08 PROCEDURE — 06BQ4ZZ EXCISION OF LEFT SAPHENOUS VEIN, PERCUTANEOUS ENDOSCOPIC APPROACH: ICD-10-PCS | Performed by: THORACIC SURGERY (CARDIOTHORACIC VASCULAR SURGERY)

## 2020-05-08 PROCEDURE — P9041 ALBUMIN (HUMAN),5%, 50ML: HCPCS | Performed by: SURGERY

## 2020-05-08 PROCEDURE — 25800030 ZZH RX IP 258 OP 636: Performed by: SURGERY

## 2020-05-08 PROCEDURE — 37000008 ZZH ANESTHESIA TECHNICAL FEE, 1ST 30 MIN: Performed by: THORACIC SURGERY (CARDIOTHORACIC VASCULAR SURGERY)

## 2020-05-08 PROCEDURE — 25000128 H RX IP 250 OP 636: Performed by: THORACIC SURGERY (CARDIOTHORACIC VASCULAR SURGERY)

## 2020-05-08 DEVICE — IMP ATRICLIP FLEX V DEVICE LAA EXLUSION 45MM ACHV45: Type: IMPLANTABLE DEVICE | Site: HEART | Status: FUNCTIONAL

## 2020-05-08 RX ORDER — ONDANSETRON 4 MG/1
4 TABLET, ORALLY DISINTEGRATING ORAL EVERY 6 HOURS PRN
Status: DISCONTINUED | OUTPATIENT
Start: 2020-05-08 | End: 2020-05-19 | Stop reason: HOSPADM

## 2020-05-08 RX ORDER — CEFAZOLIN SODIUM IN 0.9 % NACL 3 G/100 ML
3 INTRAVENOUS SOLUTION, PIGGYBACK (ML) INTRAVENOUS
Status: COMPLETED | OUTPATIENT
Start: 2020-05-08 | End: 2020-05-08

## 2020-05-08 RX ORDER — SODIUM CHLORIDE, SODIUM GLUCONATE, SODIUM ACETATE, POTASSIUM CHLORIDE AND MAGNESIUM CHLORIDE 526; 502; 368; 37; 30 MG/100ML; MG/100ML; MG/100ML; MG/100ML; MG/100ML
INJECTION, SOLUTION INTRAVENOUS CONTINUOUS PRN
Status: DISCONTINUED | OUTPATIENT
Start: 2020-05-08 | End: 2020-05-08

## 2020-05-08 RX ORDER — PROPOFOL 10 MG/ML
INJECTION, EMULSION INTRAVENOUS CONTINUOUS PRN
Status: DISCONTINUED | OUTPATIENT
Start: 2020-05-08 | End: 2020-05-08

## 2020-05-08 RX ORDER — AMOXICILLIN 250 MG
1 CAPSULE ORAL 2 TIMES DAILY
Status: DISCONTINUED | OUTPATIENT
Start: 2020-05-08 | End: 2020-05-12

## 2020-05-08 RX ORDER — HEPARIN SODIUM 5000 [USP'U]/.5ML
5000 INJECTION, SOLUTION INTRAVENOUS; SUBCUTANEOUS EVERY 8 HOURS
Status: DISCONTINUED | OUTPATIENT
Start: 2020-05-09 | End: 2020-05-10

## 2020-05-08 RX ORDER — METOCLOPRAMIDE HYDROCHLORIDE 5 MG/ML
5 INJECTION INTRAMUSCULAR; INTRAVENOUS EVERY 6 HOURS PRN
Status: DISCONTINUED | OUTPATIENT
Start: 2020-05-08 | End: 2020-05-19 | Stop reason: HOSPADM

## 2020-05-08 RX ORDER — NITROGLYCERIN 20 MG/100ML
.07-1.58 INJECTION INTRAVENOUS CONTINUOUS
Status: DISCONTINUED | OUTPATIENT
Start: 2020-05-08 | End: 2020-05-09

## 2020-05-08 RX ORDER — MUPIROCIN 20 MG/G
0.5 OINTMENT TOPICAL 2 TIMES DAILY
Status: DISPENSED | OUTPATIENT
Start: 2020-05-08 | End: 2020-05-13

## 2020-05-08 RX ORDER — NITROGLYCERIN 10 MG/100ML
INJECTION INTRAVENOUS PRN
Status: DISCONTINUED | OUTPATIENT
Start: 2020-05-08 | End: 2020-05-08

## 2020-05-08 RX ORDER — ASPIRIN 325 MG
325 TABLET ORAL DAILY
Status: DISCONTINUED | OUTPATIENT
Start: 2020-05-09 | End: 2020-05-11

## 2020-05-08 RX ORDER — ATORVASTATIN CALCIUM 40 MG/1
40 TABLET, FILM COATED ORAL AT BEDTIME
Status: DISCONTINUED | OUTPATIENT
Start: 2020-05-08 | End: 2020-05-19 | Stop reason: HOSPADM

## 2020-05-08 RX ORDER — DEXMEDETOMIDINE HYDROCHLORIDE 4 UG/ML
0.2-0.7 INJECTION, SOLUTION INTRAVENOUS CONTINUOUS
Status: DISCONTINUED | OUTPATIENT
Start: 2020-05-08 | End: 2020-05-09

## 2020-05-08 RX ORDER — MAGNESIUM SULFATE HEPTAHYDRATE 40 MG/ML
4 INJECTION, SOLUTION INTRAVENOUS EVERY 4 HOURS PRN
Status: DISCONTINUED | OUTPATIENT
Start: 2020-05-08 | End: 2020-05-19 | Stop reason: HOSPADM

## 2020-05-08 RX ORDER — LOSARTAN POTASSIUM 50 MG/1
50 TABLET ORAL ONCE
Status: DISCONTINUED | OUTPATIENT
Start: 2020-05-08 | End: 2020-05-08

## 2020-05-08 RX ORDER — NALOXONE HYDROCHLORIDE 0.4 MG/ML
.1-.4 INJECTION, SOLUTION INTRAMUSCULAR; INTRAVENOUS; SUBCUTANEOUS
Status: DISCONTINUED | OUTPATIENT
Start: 2020-05-08 | End: 2020-05-19 | Stop reason: HOSPADM

## 2020-05-08 RX ORDER — NICOTINE POLACRILEX 4 MG
15-30 LOZENGE BUCCAL
Status: DISCONTINUED | OUTPATIENT
Start: 2020-05-08 | End: 2020-05-19 | Stop reason: HOSPADM

## 2020-05-08 RX ORDER — DEXTROSE MONOHYDRATE 25 G/50ML
25-50 INJECTION, SOLUTION INTRAVENOUS
Status: DISCONTINUED | OUTPATIENT
Start: 2020-05-08 | End: 2020-05-19 | Stop reason: HOSPADM

## 2020-05-08 RX ORDER — POTASSIUM CL/LIDO/0.9 % NACL 10MEQ/0.1L
10 INTRAVENOUS SOLUTION, PIGGYBACK (ML) INTRAVENOUS
Status: DISCONTINUED | OUTPATIENT
Start: 2020-05-08 | End: 2020-05-19 | Stop reason: HOSPADM

## 2020-05-08 RX ORDER — HEPARIN SODIUM 1000 [USP'U]/ML
INJECTION, SOLUTION INTRAVENOUS; SUBCUTANEOUS PRN
Status: DISCONTINUED | OUTPATIENT
Start: 2020-05-08 | End: 2020-05-08

## 2020-05-08 RX ORDER — ACETAMINOPHEN 325 MG/1
975 TABLET ORAL EVERY 8 HOURS
Status: DISPENSED | OUTPATIENT
Start: 2020-05-08 | End: 2020-05-11

## 2020-05-08 RX ORDER — POTASSIUM CHLORIDE 29.8 MG/ML
20 INJECTION INTRAVENOUS
Status: DISCONTINUED | OUTPATIENT
Start: 2020-05-08 | End: 2020-05-19 | Stop reason: HOSPADM

## 2020-05-08 RX ORDER — MAGNESIUM SULFATE HEPTAHYDRATE 40 MG/ML
2 INJECTION, SOLUTION INTRAVENOUS DAILY PRN
Status: DISCONTINUED | OUTPATIENT
Start: 2020-05-08 | End: 2020-05-19 | Stop reason: HOSPADM

## 2020-05-08 RX ORDER — PROCHLORPERAZINE MALEATE 5 MG
5 TABLET ORAL EVERY 6 HOURS PRN
Status: DISCONTINUED | OUTPATIENT
Start: 2020-05-08 | End: 2020-05-19 | Stop reason: HOSPADM

## 2020-05-08 RX ORDER — OXYCODONE HYDROCHLORIDE 5 MG/1
5-10 TABLET ORAL EVERY 4 HOURS PRN
Status: DISCONTINUED | OUTPATIENT
Start: 2020-05-08 | End: 2020-05-19 | Stop reason: HOSPADM

## 2020-05-08 RX ORDER — POTASSIUM CHLORIDE 750 MG/1
20-40 TABLET, EXTENDED RELEASE ORAL
Status: DISCONTINUED | OUTPATIENT
Start: 2020-05-08 | End: 2020-05-19 | Stop reason: HOSPADM

## 2020-05-08 RX ORDER — CEFAZOLIN SODIUM 1 G/3ML
1 INJECTION, POWDER, FOR SOLUTION INTRAMUSCULAR; INTRAVENOUS SEE ADMIN INSTRUCTIONS
Status: DISCONTINUED | OUTPATIENT
Start: 2020-05-08 | End: 2020-05-08 | Stop reason: HOSPADM

## 2020-05-08 RX ORDER — PROPOFOL 10 MG/ML
INJECTION, EMULSION INTRAVENOUS PRN
Status: DISCONTINUED | OUTPATIENT
Start: 2020-05-08 | End: 2020-05-08

## 2020-05-08 RX ORDER — AMOXICILLIN 250 MG
2 CAPSULE ORAL 2 TIMES DAILY
Status: DISCONTINUED | OUTPATIENT
Start: 2020-05-08 | End: 2020-05-12

## 2020-05-08 RX ORDER — ACETAMINOPHEN 325 MG/1
650 TABLET ORAL EVERY 4 HOURS PRN
Status: DISCONTINUED | OUTPATIENT
Start: 2020-05-11 | End: 2020-05-08

## 2020-05-08 RX ORDER — METHOCARBAMOL 500 MG/1
500 TABLET, FILM COATED ORAL 4 TIMES DAILY PRN
Status: DISCONTINUED | OUTPATIENT
Start: 2020-05-08 | End: 2020-05-19 | Stop reason: HOSPADM

## 2020-05-08 RX ORDER — PROTAMINE SULFATE 10 MG/ML
INJECTION, SOLUTION INTRAVENOUS PRN
Status: DISCONTINUED | OUTPATIENT
Start: 2020-05-08 | End: 2020-05-08

## 2020-05-08 RX ORDER — POTASSIUM CHLORIDE 1.5 G/1.58G
20-40 POWDER, FOR SOLUTION ORAL
Status: DISCONTINUED | OUTPATIENT
Start: 2020-05-08 | End: 2020-05-19 | Stop reason: HOSPADM

## 2020-05-08 RX ORDER — ONDANSETRON 2 MG/ML
4 INJECTION INTRAMUSCULAR; INTRAVENOUS EVERY 6 HOURS PRN
Status: DISCONTINUED | OUTPATIENT
Start: 2020-05-08 | End: 2020-05-19 | Stop reason: HOSPADM

## 2020-05-08 RX ORDER — POTASSIUM CHLORIDE 7.45 MG/ML
10 INJECTION INTRAVENOUS
Status: DISCONTINUED | OUTPATIENT
Start: 2020-05-08 | End: 2020-05-19 | Stop reason: HOSPADM

## 2020-05-08 RX ORDER — GABAPENTIN 100 MG/1
100 CAPSULE ORAL 3 TIMES DAILY
Status: DISPENSED | OUTPATIENT
Start: 2020-05-08 | End: 2020-05-11

## 2020-05-08 RX ORDER — DEXTROSE MONOHYDRATE, SODIUM CHLORIDE, AND POTASSIUM CHLORIDE 50; 1.49; 4.5 G/1000ML; G/1000ML; G/1000ML
INJECTION, SOLUTION INTRAVENOUS CONTINUOUS
Status: DISCONTINUED | OUTPATIENT
Start: 2020-05-08 | End: 2020-05-09

## 2020-05-08 RX ORDER — MEPERIDINE HYDROCHLORIDE 25 MG/ML
12.5-25 INJECTION INTRAMUSCULAR; INTRAVENOUS; SUBCUTANEOUS
Status: DISCONTINUED | OUTPATIENT
Start: 2020-05-08 | End: 2020-05-09

## 2020-05-08 RX ORDER — HYDRALAZINE HYDROCHLORIDE 20 MG/ML
10 INJECTION INTRAMUSCULAR; INTRAVENOUS EVERY 30 MIN PRN
Status: DISCONTINUED | OUTPATIENT
Start: 2020-05-08 | End: 2020-05-19 | Stop reason: HOSPADM

## 2020-05-08 RX ORDER — HYDROMORPHONE HYDROCHLORIDE 1 MG/ML
.3-.5 INJECTION, SOLUTION INTRAMUSCULAR; INTRAVENOUS; SUBCUTANEOUS
Status: DISCONTINUED | OUTPATIENT
Start: 2020-05-08 | End: 2020-05-19 | Stop reason: HOSPADM

## 2020-05-08 RX ORDER — FENTANYL CITRATE 50 UG/ML
INJECTION, SOLUTION INTRAMUSCULAR; INTRAVENOUS PRN
Status: DISCONTINUED | OUTPATIENT
Start: 2020-05-08 | End: 2020-05-08

## 2020-05-08 RX ORDER — PROPOFOL 10 MG/ML
5-75 INJECTION, EMULSION INTRAVENOUS CONTINUOUS
Status: DISCONTINUED | OUTPATIENT
Start: 2020-05-08 | End: 2020-05-09

## 2020-05-08 RX ORDER — LIDOCAINE 40 MG/G
CREAM TOPICAL
Status: DISCONTINUED | OUTPATIENT
Start: 2020-05-08 | End: 2020-05-19

## 2020-05-08 RX ORDER — ALBUMIN, HUMAN INJ 5% 5 %
500-1000 SOLUTION INTRAVENOUS
Status: COMPLETED | OUTPATIENT
Start: 2020-05-08 | End: 2020-05-09

## 2020-05-08 RX ORDER — CEFAZOLIN SODIUM 2 G/100ML
2 INJECTION, SOLUTION INTRAVENOUS EVERY 8 HOURS
Status: COMPLETED | OUTPATIENT
Start: 2020-05-08 | End: 2020-05-09

## 2020-05-08 RX ORDER — METOCLOPRAMIDE 5 MG/1
5 TABLET ORAL EVERY 6 HOURS PRN
Status: DISCONTINUED | OUTPATIENT
Start: 2020-05-08 | End: 2020-05-19 | Stop reason: HOSPADM

## 2020-05-08 RX ADMIN — EPINEPHRINE 0.04 MCG/KG/MIN: 1 INJECTION PARENTERAL at 20:58

## 2020-05-08 RX ADMIN — MELATONIN TAB 3 MG 3 MG: 3 TAB at 02:58

## 2020-05-08 RX ADMIN — AMINOCAPROIC ACID 1.25 G/HR: 250 INJECTION, SOLUTION INTRAVENOUS at 10:06

## 2020-05-08 RX ADMIN — NOREPINEPHRINE BITARTRATE 6.4 MCG: 1 INJECTION, SOLUTION, CONCENTRATE INTRAVENOUS at 08:42

## 2020-05-08 RX ADMIN — MIDAZOLAM HYDROCHLORIDE 1 MG: 1 INJECTION, SOLUTION INTRAMUSCULAR; INTRAVENOUS at 08:08

## 2020-05-08 RX ADMIN — ATORVASTATIN CALCIUM 40 MG: 40 TABLET, FILM COATED ORAL at 22:09

## 2020-05-08 RX ADMIN — FENTANYL CITRATE 250 MCG: 50 INJECTION, SOLUTION INTRAMUSCULAR; INTRAVENOUS at 13:18

## 2020-05-08 RX ADMIN — NOREPINEPHRINE BITARTRATE 6.4 MCG: 1 INJECTION, SOLUTION, CONCENTRATE INTRAVENOUS at 09:30

## 2020-05-08 RX ADMIN — Medication 1 G: at 13:30

## 2020-05-08 RX ADMIN — HUMAN INSULIN 2 UNITS/HR: 100 INJECTION, SOLUTION SUBCUTANEOUS at 12:47

## 2020-05-08 RX ADMIN — CEFAZOLIN SODIUM 2 G: 2 INJECTION, SOLUTION INTRAVENOUS at 20:58

## 2020-05-08 RX ADMIN — FENTANYL CITRATE 350 MCG: 50 INJECTION, SOLUTION INTRAMUSCULAR; INTRAVENOUS at 09:29

## 2020-05-08 RX ADMIN — ROCURONIUM BROMIDE 100 MG: 10 INJECTION INTRAVENOUS at 09:30

## 2020-05-08 RX ADMIN — ROCURONIUM BROMIDE 100 MG: 10 INJECTION INTRAVENOUS at 08:18

## 2020-05-08 RX ADMIN — HYDROMORPHONE HYDROCHLORIDE 1 MG: 1 INJECTION, SOLUTION INTRAMUSCULAR; INTRAVENOUS; SUBCUTANEOUS at 11:30

## 2020-05-08 RX ADMIN — ALBUMIN HUMAN 500 ML: 0.05 INJECTION, SOLUTION INTRAVENOUS at 18:00

## 2020-05-08 RX ADMIN — PHENYLEPHRINE HYDROCHLORIDE 0.5 MCG/KG/MIN: 10 INJECTION INTRAVENOUS at 19:32

## 2020-05-08 RX ADMIN — NOREPINEPHRINE BITARTRATE 6.4 MCG: 1 INJECTION, SOLUTION, CONCENTRATE INTRAVENOUS at 08:33

## 2020-05-08 RX ADMIN — NITROGLYCERIN 100 MCG: 10 INJECTION INTRAVENOUS at 13:36

## 2020-05-08 RX ADMIN — SODIUM CHLORIDE, SODIUM GLUCONATE, SODIUM ACETATE, POTASSIUM CHLORIDE AND MAGNESIUM CHLORIDE: 526; 502; 368; 37; 30 INJECTION, SOLUTION INTRAVENOUS at 10:15

## 2020-05-08 RX ADMIN — NOREPINEPHRINE BITARTRATE 6.4 MCG: 1 INJECTION, SOLUTION, CONCENTRATE INTRAVENOUS at 08:20

## 2020-05-08 RX ADMIN — HEPARIN SODIUM 3000 UNITS: 1000 INJECTION INTRAVENOUS; SUBCUTANEOUS at 10:07

## 2020-05-08 RX ADMIN — ACETAMINOPHEN 975 MG: 325 TABLET, FILM COATED ORAL at 23:07

## 2020-05-08 RX ADMIN — HUMAN INSULIN 3 UNITS/HR: 100 INJECTION, SOLUTION SUBCUTANEOUS at 22:09

## 2020-05-08 RX ADMIN — Medication 1 UNITS: at 15:04

## 2020-05-08 RX ADMIN — MIDAZOLAM HYDROCHLORIDE 4 MG: 1 INJECTION, SOLUTION INTRAMUSCULAR; INTRAVENOUS at 08:34

## 2020-05-08 RX ADMIN — PROTAMINE SULFATE 300 MG: 10 INJECTION, SOLUTION INTRAVENOUS at 13:37

## 2020-05-08 RX ADMIN — ROCURONIUM BROMIDE 50 MG: 10 INJECTION INTRAVENOUS at 11:23

## 2020-05-08 RX ADMIN — SUGAMMADEX 200 MG: 100 INJECTION, SOLUTION INTRAVENOUS at 15:14

## 2020-05-08 RX ADMIN — FENTANYL CITRATE 150 MCG: 50 INJECTION, SOLUTION INTRAMUSCULAR; INTRAVENOUS at 08:18

## 2020-05-08 RX ADMIN — Medication 1 G: at 09:30

## 2020-05-08 RX ADMIN — AMINOCAPROIC ACID 7.5 G: 250 INJECTION, SOLUTION INTRAVENOUS at 08:58

## 2020-05-08 RX ADMIN — HEPARIN SODIUM 10000 UNITS: 1000 INJECTION INTRAVENOUS; SUBCUTANEOUS at 11:15

## 2020-05-08 RX ADMIN — PROPOFOL 20 MCG/KG/MIN: 10 INJECTION, EMULSION INTRAVENOUS at 21:01

## 2020-05-08 RX ADMIN — NITROGLYCERIN 50 MCG: 10 INJECTION INTRAVENOUS at 13:32

## 2020-05-08 RX ADMIN — SODIUM CHLORIDE, SODIUM GLUCONATE, SODIUM ACETATE, POTASSIUM CHLORIDE AND MAGNESIUM CHLORIDE: 526; 502; 368; 37; 30 INJECTION, SOLUTION INTRAVENOUS at 07:57

## 2020-05-08 RX ADMIN — PANTOPRAZOLE SODIUM 40 MG: 40 INJECTION, POWDER, FOR SOLUTION INTRAVENOUS at 17:25

## 2020-05-08 RX ADMIN — PROPOFOL 70 MG: 10 INJECTION, EMULSION INTRAVENOUS at 08:18

## 2020-05-08 RX ADMIN — ROCURONIUM BROMIDE 50 MG: 10 INJECTION INTRAVENOUS at 13:12

## 2020-05-08 RX ADMIN — NOREPINEPHRINE BITARTRATE 0.03 MCG/KG/MIN: 1 INJECTION, SOLUTION, CONCENTRATE INTRAVENOUS at 08:11

## 2020-05-08 RX ADMIN — SODIUM CHLORIDE, SODIUM GLUCONATE, SODIUM ACETATE, POTASSIUM CHLORIDE AND MAGNESIUM CHLORIDE: 526; 502; 368; 37; 30 INJECTION, SOLUTION INTRAVENOUS at 08:28

## 2020-05-08 RX ADMIN — EPINEPHRINE 10 MCG: 1 INJECTION PARENTERAL at 08:18

## 2020-05-08 RX ADMIN — HYDROMORPHONE HYDROCHLORIDE 1 MG: 1 INJECTION, SOLUTION INTRAMUSCULAR; INTRAVENOUS; SUBCUTANEOUS at 09:12

## 2020-05-08 RX ADMIN — Medication 1 UNITS: at 13:27

## 2020-05-08 RX ADMIN — Medication 100 MCG/HR: at 19:00

## 2020-05-08 RX ADMIN — EPINEPHRINE 0.03 MCG/KG/MIN: 1 INJECTION PARENTERAL at 13:15

## 2020-05-08 RX ADMIN — PROPOFOL 40 MCG/KG/MIN: 10 INJECTION, EMULSION INTRAVENOUS at 17:00

## 2020-05-08 RX ADMIN — POTASSIUM CHLORIDE, DEXTROSE MONOHYDRATE AND SODIUM CHLORIDE: 150; 5; 450 INJECTION, SOLUTION INTRAVENOUS at 21:04

## 2020-05-08 RX ADMIN — FENTANYL CITRATE 250 MCG: 50 INJECTION, SOLUTION INTRAMUSCULAR; INTRAVENOUS at 14:19

## 2020-05-08 RX ADMIN — NOREPINEPHRINE BITARTRATE 6.4 MCG: 1 INJECTION, SOLUTION, CONCENTRATE INTRAVENOUS at 09:34

## 2020-05-08 RX ADMIN — HEPARIN SODIUM 47000 UNITS: 1000 INJECTION INTRAVENOUS; SUBCUTANEOUS at 10:38

## 2020-05-08 RX ADMIN — Medication 3 G: at 08:55

## 2020-05-08 RX ADMIN — PROPOFOL 25 MCG/KG/MIN: 10 INJECTION, EMULSION INTRAVENOUS at 13:50

## 2020-05-08 ASSESSMENT — ACTIVITIES OF DAILY LIVING (ADL)
ADLS_ACUITY_SCORE: 14
ADLS_ACUITY_SCORE: 15

## 2020-05-08 ASSESSMENT — MIFFLIN-ST. JEOR: SCORE: 2042.25

## 2020-05-08 NOTE — OP NOTE
OPERATIVE DATE: 5/8/2020    PRE-OPERATIVE DIAGNOSIS:  1) Coronary artery disease  2) Ischemic cardiomyopathy with reduced ejection fraction  3) Heart failure  4) Paroxysmal atrial fibrillation  Patient Active Problem List   Diagnosis     CAD (coronary artery disease)     Coronary artery disease involving native coronary artery of native heart without angina pectoris     POST-OPERATIVE DIAGNOSIS:  1) Coronary artery disease  2)  Ischemic cardiomyopathy with reduced ejection fraction  3) Heart failure  4) Paroxysmal atrial fibrillation  Patient Active Problem List   Diagnosis     CAD (coronary artery disease)     Coronary artery disease involving native coronary artery of native heart without angina pectoris       PROCEDURE:  1) Coronary artery bypass grafting x 3.   - Left internal mammary artery to left anterior descending artery   - Reversed saphenous vein graft to right posterior descending artery   - Reversed saphenous vein graft to obtuse marginal artery  2) Endoscopic vein harvest left lower extremity  3) Bilateral pulmonary vein isolation  4) Left atrial appendage ligation - 45mm AtriClip  5) Transesophageal echocardiogram    SURGEON: Yobany Holloway MD    ASSISTANT: Aiyana Nolasco MD; Kayli Chery PA-C    ANESTHESIA: GETA    ESTIMATED BLOOD LOSS: 1000cc    OPERATIVE FINDINGS:  1) Ejection fraction: 35%  2) Left internal mammary artery 3mm and pulsatile flow.  3) Left greater saphenous vein 4-5mm and suitable for bypass.  4) Left anterior descending artery 2mm and free of disease at anastomosis.  5) Right posterior descending artery 1.25mm and free of disease at anastomosis.  6) Obtuse marginal artery 2mm and free of disease at anastomosis.  7) Diagonal artery less than 1mm and not suitable for bypass.    CARDIOPULMONARY BYPASS TIME: 115 minutes    AORTIC CROSS CLAMP TIME: 93 minutes    INDICATIONS:  Mr. ANJALI PABLO is a 70 year old male admitted to [facility] with new onset atrial  fibrillation and symptomatic heart failure.  He had coronary angiogram showing 3V CAD.  Due to his extremely reduced EF he was transferred here for CABG with VAD backup.  Risks and benefits of the operation were explained to the patient and their family including, but not limited to, bleeding, infection, stroke and even death.  They understood these risks and agreed to proceed electively.    OPERATIVE REPORT:  The patient was transferred to the operating room and positioned supine on the OR table.  General anesthesia was initiated by the anesthesia team.  Endotracheal intubation and central venous access was performed by anesthesia.  The patients neck, chest, abdomen and bilateral lower extremities were clipped, prepped and draped in sterile fashion.  A pre-procedure time-out was performed confirming the correct patient, correct site and correct procedure.    Simultaneous median sternotomy and left lower extremity skin incisions were made.  Endoscopic vein harvest of the left greater saphenous vein was performed.  The vein was ligated at the proximal and distal ends, harvested from the leg, cannulated in reverse orientation, and flushed with heparinized saline.  Branches of the vein were triply clipped with metal clips.  The vein was then stored in heparinized saline.    Median sternotomy was made with reciprocating saw.  The bone was made hemostatic with cautery and bone wax.  A mammary retractor was placed.  The left pleural space was opened.  The left internal mammary artery was mobilized.  Branches of the artery were clipped with metal clips and divided with cautery.      The patient was given 300 mg/kg IV heparin.  The mammary pedicle was clamped with a tonsil clamp.  The mammary artery was divided with metzenbaum scissors.  There was excellent pulsatile flow from the mammary artery.  This was controlled with a bulldog clamp.  The distal aspect was tied with 0-ethibond tie and double clipped.  The proximal end  was spatulated in preparation for anastomosis and flushed with papavarine.      Pledgeted 2-0 ethibond pursestring was made in the ascending aorta.  A 20F EOPA arterial cannula was placed here and connected to the bypass circuit.  A 2-0 ethibond pursestring was made in the right atrial appendage.  A 32/40F two stage venous cannula was placed here and connected to the cardiopulmonary bypass circuit.  A 4-0 prolene pursestring was made in the right atrium.  A stab incision was made here.  A retrograde cardioplegia catheter was placed and connected to the cardioplegia circuit.  Next the aorto-pulmonary window was developed.  A 4-0 prolene pursestring was made in the ascending aorta.  A DLP root vent / antegrade cardioplegia catheter was placed here and connected to the cardioplegia circuit.    Cardiopulmonary bypass was initiated with good flows.      Right sided pulmonary vein isolation was performed using the AtriCure clamp.    Flow on the circuit was decreased.  A large aortic cross clamp was applied.  Flow on the circuit was resumed.  1000cc of antegrade cold blood cardioplegia was delivered with good diastolic arrest.  An additional 300cc of retrograde cold blood cardioplegia was used to test the retrograde.    Left sided pulmonary vein isolation was performed using the AtriCure clamp.  Left atrial appendage ligation was performed using the 45mm AtriCure clip.    We focused our attention on the distal bypass anastomoses next.    The following bypasses were constructed using reversed saphenous vein in end-to-side fashion with running 7-0 prolene:  1) Reversed saphenous vein graft to right posterior descending artery.  2) Reversed saphenous vein graft to obtuse marginal artery.  The anastomoses were tested by flushing with cold blood cardioplegia to ensure hemostasis.  An additional dose of retrograde cold blood cardioplegia was delivered between completion of each anastomosis.    Next a rent was made in the left  pericardium.  The left internal mammary artery was then anastomosed to the left anterior descending artery in end-to-side fashion using running 8-0 prolene.  The mammary pedicle clamp was removed.  The anastomosis was hemostatic.  The clamp was replaced.  The pedicle was tacked to the epicardium using 6-0 prolene.    We focused our attention on the proximal vein graft anastomoses next.  The vein grafts were sized to fit to the ascending aorta.  Two additional aortotomies were made with 11-blade knife.  The aortotomies were extended with 4mm punch.  The vein grafts were anastomosed to the ascending aorta in end-to-side fashion using running 6-0 prolene.    A retrograde hot shot of warm blood cardiplegia was delivered.  The clamp on the mammary pedicle was removed.  A bulldog clamp was placed on the proximal vein grafts.  After completion of the hot shot, flow on the bypass circuit was decreased and the aortic cross clamp was removed.  Flow on the bypass circuit was resumed.  The proximal vein grafts were de-aired using an insulin needle.  The bulldog clamp on the vein grafts was removed.  The retrograde cardioplegia catheter was removed.  The pursestring was tied.  The site was over-sewn with a 4-0 prolene.  The distal anastomoses were inspected and hemostatic.  Ventricular pacing wires were placed and delivered through the anterior abdominal wall and secured to the skin with 0-ethibond stitches.  The patient had normal sinus rhythm and was paced at 90 bpm.      The left pleural space was suctioned dry.  The lungs were ventilated bilaterally.  MICHAEL showed no intra-cardiac air.  The DLP root vent / antegrade cardioplegia catheter was removed.  Intravenous calcium was administered.  Flow on the bypass circuit was weaned to off.  The patient was stable on low dose epinephrine and nitroglycerin.  The venous cannula was removed.  Pursestring at this site was tied.  This was over-sewed with 0-ethibond.  The remaining pump  blood volume was returned via the arterial cannula.  A test dose of protamine was administered.  The arterial cannula was removed.  The pursestrings at this site were tied.  This was oversewn with pledgeted 4-0 prolene.     The sternal retractor was removed.  Two 32F straight argyle mediastinal chest tubes were placed and one 28F left pleural chest tube was placed.  These were delivered through the anterior abdominal wall and secured to the skin with 0-ethibond stitches.      The wound was made hemostatic with cautery.  The subcutaneous tissue, sternal edges, thymic fat, pericardial edges and all anastomotic and cannulation sites were inspected and hemostatic.    The wound was irrigated with warm antibiotic saline.  The sternum was reapproximated with sternal wires.    The wound was made hemostatic then closed in layers using vicryl stitches.  The subcutaneous tissue was closed with 2-0 vicryl stitches.  The skin was closed with 3-0 vicryl.  Dermabond skin glue was applied.  A sterile dressing was applied.      The patient was then transferred from the operating bed to an ICU bed and transferred to the ICU in critical, but stable, condition.    All needle, sponge and instrument counts were correct at the end of the case.    Yobany Holloway  Cardiothoracic Surgery  Pager: [phone]

## 2020-05-08 NOTE — ANESTHESIA CARE TRANSFER NOTE
Patient: Rudi Schilling    Procedure(s):  Median sternotomy.  Coronary artery bypass grafts x3 using harvested left internal mammary artery and left endoscopically harvested greater saphenous vein. Bilateral  Pulmonary vein isolation using atricure.  Left atrial appendage ligation using Atricure clip size 45.  Cardiopulmonary bypass.  Intraoperative transesophageal echocardiogram per anesthsia    Diagnosis: CAD (coronary artery disease) [I25.10]  Diagnosis Additional Information: No value filed.    Anesthesia Type:   General     Note:  Airway :ETT and Ventilator  Patient transferred to:ICU  Comments: Pt tx'd to 4E. VSS en route. Report to RN.ICU Handoff: Call for PAUSE to initiate/utilize ICU HANDOFF, Identified Patient, Identified Responsible Provider, Reviewed the Pertinent Medical History, Discussed Surgical Course, Reviewed Intra-OP Anesthesia Management and Issues during Anesthesia, Set Expectations for Post Procedure Period and Allowed Opportunity for Questions and Acknowledgement of Understanding      Vitals: (Last set prior to Anesthesia Care Transfer)    CRNA VITALS  5/8/2020 1434 - 5/8/2020 1534      5/8/2020             Ht Rate:  (!) 0                Electronically Signed By: JONAS Trevino CRNA  May 8, 2020  3:40 PM

## 2020-05-08 NOTE — ANESTHESIA PREPROCEDURE EVALUATION
"Anesthesia Pre-Procedure Evaluation    Patient: Rudi Schilling   MRN:     [id number] Gender:   male   Age:    70 year old :      1950        Preoperative Diagnosis: CAD (coronary artery disease) [I25.10]   Procedure(s):  CORONARY ARTERY BYPASS GRAFT (CABG)     LABS:  CBC:   Lab Results   Component Value Date    WBC 8.7 2020    WBC 8.4 2020    HGB 12.6 (L) 2020    HGB 12.2 (L) 2020    HCT 41.6 2020    HCT 41.5 2020     2020     2020     BMP:   Lab Results   Component Value Date     2020     2020    POTASSIUM 4.4 2020    POTASSIUM 4.8 2020    CHLORIDE 102 2020    CHLORIDE 103 2020    CO2 29 2020    CO2 31 2020    BUN 16 2020    BUN 16 2020    CR 0.86 2020    CR 1.00 2020     (H) 2020     (H) 2020     COAGS:   Lab Results   Component Value Date    PTT 70 (H) 2020    INR 1.18 (H) 2020     POC:   Lab Results   Component Value Date     (H) 2020     OTHER:   Lab Results   Component Value Date    A1C 5.8 (H) 2020    SIDRA 9.3 2020    PHOS 3.6 2020    MAG 2.1 2020    ALBUMIN 3.1 (L) 2020    PROTTOTAL 7.2 2020    ALT 28 2020    AST 19 2020    ALKPHOS 42 2020    BILITOTAL 0.6 2020    TSH 5.12 (H) 2020        Preop Vitals    BP Readings from Last 3 Encounters:   20 (!) 145/93    Pulse Readings from Last 3 Encounters:   20 90      Resp Readings from Last 3 Encounters:   20 16    SpO2 Readings from Last 3 Encounters:   20 94%      Temp Readings from Last 1 Encounters:   20 36.9  C (98.4  F) (Oral)    Ht Readings from Last 1 Encounters:   20 1.778 m (5' 10\")      Wt Readings from Last 1 Encounters:   20 129.3 kg (285 lb 0.9 oz)    Estimated body mass index is 40.9 kg/m  as calculated from the following:    Height as of this " "encounter: 1.778 m (5' 10\").    Weight as of this encounter: 129.3 kg (285 lb 0.9 oz).     LDA:  Peripheral IV 05/06/20 Right Lower forearm (Active)   Site Assessment WDL 05/07/20 1515   Line Status Infusing 05/07/20 1515   Phlebitis Scale 0-->no symptoms 05/07/20 1515   Infiltration Scale 0 05/07/20 1515   Extravasation? No 05/07/20 1515   Number of days: 1       Arterial Sheath  (Active)   Number of days: 0       Venous Sheath (Active)   Number of days: 0       Pulmonary Artery Catheter - Triple Lumen 05/07/20 1701 Right internal jugular vein monitoring catheter (Active)   Number of days: 0        No past medical history on file.   No past surgical history on file.   No Known Allergies     Anesthesia Evaluation     . Pt has had prior anesthetic.            ROS/MED HX    ENT/Pulmonary:  - neg pulmonary ROS   (+)NOEMI risk factors hypertension, obese, , . .    Neurologic:  - neg neurologic ROS     Cardiovascular:     (+) hypertension--CAD, --. Taking blood thinners : . . MIMS, . :. dysrhythmias a-fib, valvular problems/murmurs type: MR . Previous cardiac testing Echodate:5/6/20results:Interpretation Summary  Technically difficult study.Extremely difficult acoustic windows despite the  use of contrast for endcardial border definition.  Moderately (EF 30-35%) reduced left ventricular function is present. Moderate  diffuse hypokinesis is present.  Moderate to severe biatrial enlargement is present.  Global right ventricular function is mildly to moderately reduced.  No pericardial effusion is present.  The inferior vena cava cannot be assessed.  There is no prior study for direct comparison.     Left Ventricle  Left ventricular wall thickness is normal. Moderate left ventricular dilation  is present. Moderately (EF 30-35%) reduced left ventricular function is  present. Diastolic function not assessed due to atrial fibrillation. Moderate  diffuse hypokinesis is present.     Right Ventricle  Moderate right ventricular " dilation is present. Global right ventricular  function is mildly to moderately reduced.     Atria  Moderate to severe biatrial enlargement is present.     Mitral Valve  The mitral valve is normal.        Aortic Valve  Aortic valve is normal in structure and function.     Tricuspid Valve  The tricuspid valve is normal. The right ventricular systolic pressure is  approximated at 16.8 mmHg plus the right atrial pressure.     Pulmonic Valve  The pulmonic valve is normal.     Vessels  The aorta root is normal. The inferior vena cava cannot be assessed.     Pericardium  No pericardial effusion is present.       MMode/2D Measurements & Calculations     IVSd: 0.99 cm  LVIDd: 6.2 cm  LVIDs: 5.1 cm  LVPWd: 1.2 cm  FS: 16.7 %  LV mass(C)d: 286.3 grams  LV mass(C)dI: 118.0 grams/m2  Ao root diam: 2.9 cm  asc Aorta Diam: 3.2 cm  LVOT diam: 2.5 cm  LVOT area: 4.9 cm2  LA Volume (BP): 123.0 ml  LA Volume Index (BP): 50.6 ml/m2  RWT: 0.38           Doppler Measurements & Calculations  MV E max radha: 88.6 cm/sec  MV A max radha: 30.1 cm/sec  MV E/A: 2.9  MV dec slope: 629.0 cm/sec2  PA acc time: 0.09 sec  TR max radha: 205.0 cm/sec  TR max P.8 mmHgdate: results:ECG reviewed date: [facility]:2020 Coronary Angiogram  LAD:               - proximal LAD: 80% stenosis  LCx:  - Prox CX: 70% stenosis 5 mm length.  - Prox CX: 40% stenosis.  - Mid CX: 80% stenosis 4 mm length  RCA:  - Prox RCA: Ostial.  50% stenosis 8 mm length  - Prox RCA: 80% stenosis 9 mm length  - R PDA: Ostial. 100% stenosis 6 mm length  - R PAV: 8% stenosis 4 mm length  Cardiac collaterals  - Collateral flow from the second septal to the R PDA  - Collateral flow from the first obtuse marginal to the 3rd RPL          METS/Exercise Tolerance:     Hematologic:  - neg hematologic  ROS   (+) Anemia, -      Musculoskeletal: Comment: Chronic back pain.         GI/Hepatic:     (+) GERD       Renal/Genitourinary:         Endo:     (+) Obesity,  .      Psychiatric:  - neg psychiatric ROS       Infectious Disease:  - neg infectious disease ROS       Malignancy:      - no malignancy   Other:                         PHYSICAL EXAM:   Mental Status/Neuro: A/A/O   Airway: Facies: Feasible  Mallampati: III  Mouth/Opening: Full  TM distance: > 6 cm  Neck ROM: Full   Respiratory: Auscultation: CTAB     Resp. Rate: Normal     Resp. Effort: Normal      CV: Rhythm: Regular  Rate: Age appropriate  Heart: Murmur (Systolic)  Edema: None   Comments:      Dental: Normal Dentition                Assessment:   ASA SCORE: 4    H&P: History and physical reviewed and following examination; no interval change.   Smoking Status:  Non-Smoker/Unknown   NPO Status: NPO Appropriate     Plan:   Anes. Type:  General   Pre-Medication: None   Induction:  IV (Standard)   Airway: ETT; Oral   Access/Monitoring: PIV; 2nd PIV; A-Line; FloTrac; MAC-Line; PAC   Maintenance: Balanced     Blood products: Blood in Room; PRBC; FFP     Drips/Meds: Norepi; Epinephrine     Postop Plan:   Postop Pain: Opioids  Postop Sedation/Airway: SECURED AIRWAY likely  Disposition: ICU     PONV Management:   Adult Risk Factors:, Non-Smoker, Postop Opioids   Prevention: Ondansetron, Dexamethasone     CONSENT: Direct conversation   Plan and risks discussed with: Patient   Blood Products: Consented (ALL Blood Products)                   Denis Liu MD

## 2020-05-08 NOTE — BRIEF OP NOTE
[facility] [place]    Brief Operative Note    Pre-operative diagnosis: CAD (coronary artery disease) [I25.10]  Post-operative diagnosis Same as pre-operative diagnosis    Procedure: Procedure(s):  Median sternotomy.  Coronary artery bypass grafts x3 using harvested left internal mammary artery and left endoscopically harvested greater saphenous vein. Bilateral  Pulmonary vein isolation using atricure.  Left atrial appendage ligation using Atricure clip size 45.  Cardiopulmonary bypass.  Intraoperative transesophageal echocardiogram per anesthsia  Surgeon: Surgeon(s) and Role:     * Yobany Holloway MD - Primary     * Kayli Webster PA-C - Assisting     * Aiyana Nolasco MD - Fellow - Assisting  Anesthesia: General   Estimated blood loss: 1000 ml  Drains: Med x 2; left pleural x 1  Specimens: * No specimens in log *  Findings:   CAD.  Complications: None.  Implants:   Implant Name Type Inv. Item Serial No.  Lot No. LRB No. Used Action   IMP ATRICLIP FLEX V DEVICE TORIBIO EXLUSION 45MM [id number] Clip IMP ATRICLIP FLEX V DEVICE TORIBIO EXLUSION 45MM [id number]  [employer] [id number] N/A 1 Implanted

## 2020-05-08 NOTE — PROGRESS NOTES
CLINICAL NUTRITION SERVICES - BRIEF NOTE    Received provider consult for nutrition education with comments post op cardiovascular surgery (automatic consult on post-op order set). S/p CABG on 5/8. Nutrition education to be completed as able/appropriate (as pt s/p CABG and/or initial VAD).    RD will follow per protocol

## 2020-05-08 NOTE — PLAN OF CARE
Patient alert and orient, makes needs known, denies pain overnight. A.fib 's, BP stable. IABP numbers stable, plan for CAB this morning. Has been NPO since 0000, both CHG baths given. LS coarse/dim, SOB on exertion. 2L O2 while asleep to maintain sats >92%. Urinal voiding adequate amounts. Bmx2, soft.     Problem: Adult Inpatient Plan of Care  Goal: Plan of Care Review  Outcome: No Change  Goal: Patient-Specific Goal (Individualization)  Outcome: No Change  Goal: Absence of Hospital-Acquired Illness or Injury  Outcome: No Change  Goal: Optimal Comfort and Wellbeing  Outcome: No Change  Goal: Readiness for Transition of Care  Outcome: No Change  Goal: Rounds/Family Conference  Outcome: No Change     Problem: Adjustment to Illness (Heart Failure)  Goal: Optimal Coping  Outcome: No Change     Problem: Arrhythmia/Dysrhythmia (Heart Failure)  Goal: Stable Heart Rate and Rhythm  Outcome: No Change     Problem: Cardiac Output Decreased (Heart Failure)  Goal: Optimal Cardiac Output  Outcome: No Change     Problem: Fluid Imbalance (Heart Failure)  Goal: Fluid Balance  Outcome: No Change     Problem: Functional Ability Impaired (Heart Failure)  Goal: Optimal Functional Ability  Outcome: No Change     Problem: Oral Intake Inadequate (Heart Failure)  Goal: Optimal Nutrition Intake  Outcome: No Change     Problem: Respiratory Compromise (Heart Failure)  Goal: Effective Oxygenation and Ventilation  Outcome: No Change     Problem: Sleep Disordered Breathing (Heart Failure)  Goal: Effective Breathing Pattern During Sleep  Outcome: No Change

## 2020-05-08 NOTE — ANESTHESIA PROCEDURE NOTES
Perioperative MICHAEL Report  Anesthesia Information  MICHAEL probe placed and report generated by:   Denis Liu MD in the presence of a teaching physician :  Will Garber MD    I personally reviewed the images and the fellow/resident interpretation.  I agree with the fellow/resident interpretation as amended by myself. Will Garber MD  Images for this study have been archived.   Surgeon:  Yobany Holloway MD      Preanesthesia Checklist:  Patient identified, IV assessed, risks and benefits discussed, monitors and equipment assessed, procedure being performed at surgeon's request and anesthesia consent obtained.  General Procedure Information  Modalities:  2D, 3D, CW Doppler and PW Doppler    Ischemic cardiomyopathy. IABP in place.   Echocardiographic and Doppler Measurements  Right Ventricle:  Cavity size dilated.   Diastolic dimension 4.5 cm.   Hypertrophy not present.   Thrombus not present.    Global function moderately impaired.     Left Ventricle:  Cavity size normal.   Diastolic dimension 5.9 cm.   Hypertrophy not present.   Thrombus not present.   Global Function severely impaired.   Ejection Fraction 38%.      Ventricular Regional Function:  1- Basal Anteroseptal:  hypokinetic  2- Basal Anterior:  hypokinetic  3- Basal Anterolateral:  hypokinetic  4- Basal Inferolateral:  hypokinetic  5- Basal Inferior:  hypokinetic  6- Basal Inferoseptal:  hypokinetic  7- Mid Anteroseptal:  hypokinetic  8- Mid Anterior:  hypokinetic  9- Mid Anterolateral:  hypokinetic  10- Mid Inferolateral:  hypokinetic  11- Mid Inferior:  hypokinetic  12- Mid Inferoseptal:  hypokinetic  13- Apical Anterior:  hypokinetic  14- Apical Lateral:  hypokinetic  15- Apical Inferior:  hypokinetic  16- Apical Septal:  hypokinetic  17- Valdez:  hypokinetic    Valves  Aortic Valve: Annulus normal.  Stenosis not present.  Area: 1.91 cm .  Mean Gradient: 4 mmHg.  Regurgitation +2.  Leaflets normal.  Leaflet motions  normal.    Mitral Valve: Annulus normal.  Stenosis not present.  Area: 4.37 cm .  Mean Gradient: 1 mmHg.  Regurgitation +1.  Leaflets normal.  Leaflet motions normal.    Tricuspid Valve: Annulus normal.  Stenosis not present.  Regurgitation +1.  Leaflets normal.  Leaflet motions normal.    Pulmonic Valve: Annulus normal.  Stenosis not present.  Regurgitation absent.      Aorta: Ascending Aorta: Size normal.  Diameter 3.4 cm.  Dissection not present.  Plaque thickness less than 3 mm.  Mobile plaque not present.    Aortic Arch: Size normal.   Dissection not present.   Plaque thickness less than 3 mm.   Mobile plaque not present.    Descending Aorta: Size normal.   Dissection not present.   Plaque thickness less than 3 mm.   Mobile plaque not present.        Right Atrium:  Size normal.   Spontaneous echo contrast not present.   Thrombus not present.   Tumor not present.   Device not present.     Left Atrium: Size normal.  Spontaneous echo contrast not present.  Thrombus not present.  Tumor not present.  Device not present.    Left atrial appendage normal.     Atrial Septum: Intra-atrial septal morphology normal.     Ventricular Septum: Intra-ventricular septum morphology normal.     Diastolic Function Measurements:  Diastolic Dysfunction Grade=. E= ms. A= ms. E/A Ratio= . DT=  ms.  S/D=  0.6.  IVRT= ms.  Other Findings:   Pericardium:  normal.  Pleural Effusion:  none. Pulmonary Arteries:  normal.  Pulmonary Venous Flow:  blunted (decreased) systolic flow.  Cornoary sinus catheter present.  Coronary sinus size (mm):  1.2.  .  Post Intervention Findings  Procedure(s) performed:  CABG (PVI/TORIBIO clip. ). Global function:  Improved.   Regional wall motion:  Improved   Surgeon(s) notified of all postintervention findings:  Yes  .  .  .   .  .  .  .  .  .  .    3 vessel CABG to LAD, OM, PDA. PVI/TORIBIO clip placement:  LVEF 45% on inotropic support. RV global function remains moderately reduced. No new RWMA. Inferior and lateral  wall contraction slightly improved. No Aortic dissection. IABP advanced 5cm and now 2cm distal to Left subclavian.  AI remains mild with no stenosis.   MR remains trace with no MS.  TR remains trace with no TS.   LA remains trains around swan. No PS.   No PFO.   TORIBIO no longer visualized after clip placed.   All findings and information relayed to surgeon.      Echocardiogram Comments

## 2020-05-08 NOTE — CONSULTS
Dental Service Consultation        Rudi Schilling MRN# [id number]   YOB: 1950 Age: 70 year old   Date of Admission: 5/5/2020     Reason for consult: I was asked by Fer Banuelos MD to evaluate this patient for dental clearance.           Assessment and Plan:   Assessment: Pt had a CT taken on 5/5/2020 and evaluated for dental infection. Pt has periapical radiolucencies on First premolars bilaterally, maxillary canines bilaterally, root tip of maxillary left later incisor and mandibular right first premolar There is a remaining root tip of maxillary central incisor. Heavy periodontal disease and bone loss on remaining lower 5 or 6 teeth. Generalized gross caries.    Pt has remaining dentition: #5 root tip, #6 root tip, #8 root tip, #10 root tip, #11 root tip, #12 root tip, #20-22, possible root tip #23, root tip #27, #28    Plan:    - Schedule pt for OR spot with [facility] in order to extract remaining dentition due to dental infection.  - Could do it as soon as this Thursday if pt in the right condition..               Chief Complaint:   No chief complaint     History obtained from H&P                    History of Present Illness:   This patient is a 70 year old male who presents to [facility] with PMH morbid obesity (BMI 40) and recently diagnosed atrial fibrillation and multivessel CAD with biventricular HFrEF (EF 10%). Transferred from OSH on 5/5/20 for management and revascularization. Underwent CABG with Dr. Holloway on 5/8.              Past Medical History:   No past medical history on file.          Past Surgical History:   No past surgical history on file.            Social History:     Social History     Tobacco Use     Smoking status: Not on file   Substance Use Topics     Alcohol use: Not on file             Family History:   No family history on file.          Immunizations:     There is no immunization history on file for this patient.          Allergies:   No Known  Allergies          Medications:     Current Facility-Administered Medications Ordered in Epic   Medication Dose Route Frequency Last Rate Last Dose     [Auto Hold] acetaminophen (TYLENOL) tablet 650 mg  650 mg Oral Q4H PRN         [Auto Hold] alum & mag hydroxide-simethicone (MAALOX  ES) suspension 30 mL  30 mL Oral Q4H PRN         aminocaproic acid (AMICAR) 7.5 g in sodium chloride 0.9 % 150 mL infusion  1.25 g/hr Intravenous Continuous 25 mL/hr at 05/08/20 1006 1.25 g/hr at 05/08/20 1006     [Auto Hold] aspirin (ASA) chewable tablet 81 mg  81 mg Oral Daily   Stopped at 05/08/20 0800     [Auto Hold] atorvastatin (LIPITOR) tablet 40 mg  40 mg Oral QPM   40 mg at 05/07/20 1926     [Auto Hold] carvedilol (COREG) tablet 6.25 mg  6.25 mg Oral BID w/meals   Stopped at 05/08/20 0800     ceFAZolin (ANCEF) 1 g vial to attach to  ml bag for ADULT or 50 ml bag for PEDS  1 g Intravenous See Admin Instructions         [Auto Hold] digoxin (LANOXIN) tablet 125 mcg  125 mcg Oral Daily   Stopped at 05/08/20 0800     EPINEPHrine (ADRENALIN) 5 mg in sodium chloride 0.9 % 250 mL infusion  0.03-0.3 mcg/kg/min Intravenous Continuous         EPINEPHrine 100 mcg/10 mL (1:100,000) in NS (Pharmacy Prepared for OR)    Continuous PRN   10 mcg at 05/08/20 0818     fentaNYL (PF) (SUBLIMAZE) injection   Intravenous PRN   350 mcg at 05/08/20 0929     [Auto Hold] furosemide (LASIX) tablet 40 mg  40 mg Oral Daily   Stopped at 05/08/20 0800     [Held by provider] heparin  drip 25,000 units in 0.45% NaCl 250 mL  ANTICOAGULANT  (see additional administration details for dose)  0-3,500 Units/hr Intravenous Continuous   Stopped at 05/08/20 0429     heparin (porcine) 10,000 Units, magnesium sulfate 1 g, mannitol 25 % 12.5 g, sodium bicarbonate 50 mEq in sodium chloride 0.9 % PUMP PRIME solution   PERFUSION Once         heparin (porcine) 30,000 Units in sodium chloride 0.9 % PERFUSION solution   PERFUSION Once         heparin (porcine) injection    Intravenous PRN   10,000 Units at 05/08/20 1115     [Auto Hold] heparin bolus from infusion pump   Intravenous Q6H PRN   5,000 Units at 05/07/20 0740     [Auto Hold] hydrALAZINE (APRESOLINE) injection 10 mg  10 mg Intravenous Q6H PRN         HYDROmorphone (DILAUDID) injection    PRN   1 mg at 05/08/20 0912     [Auto Hold] lidocaine (LMX4) cream   Topical Q1H PRN         [Auto Hold] lidocaine 1 % 0.1-1 mL  0.1-1 mL Other Q1H PRN         lidocaine 2 % 200 mg, magnesium sulfate 1 g, mannitol 25 % 12.5 g, sodium bicarbonate 20 mEq CARDIOPLEGIA (Hot Shot) solution   PERFUSION Once         [Auto Hold] losartan (COZAAR) tablet 100 mg  100 mg Oral Daily   Stopped at 05/08/20 0800     medication instruction   Does not apply Continuous PRN         [Auto Hold] melatonin tablet 3 mg  3 mg Oral At Bedtime PRN   3 mg at 05/08/20 0258     metoprolol tartrate (LOPRESSOR) half-tab 12.5 mg  12.5 mg Oral Pre-Op/Pre-procedure x 1 dose         midazolam (VERSED) injection   Intravenous PRN   4 mg at 05/08/20 0834     naloxone (NARCAN) injection 0.1-0.4 mg  0.1-0.4 mg Intravenous Q2 Min PRN         [Auto Hold] nitroGLYcerin (NITROSTAT) sublingual tablet 0.4 mg  0.4 mg Sublingual Q5 Min PRN         [Auto Hold] No anticoagulation (IABP 1:1)   Does not apply DOES NOT GO TO MAR         norepinephrine  bolus 6.4 mcg/mL    Continuous PRN   6.4 mcg at 05/08/20 0934     norepinephrine (LEVOPHED) 16 mg in sodium chloride 0.9 % 250 mL infusion  0.03-0.4 mcg/kg/min Intravenous Continuous         norepinephrine (LEVOPHED) 3.2 mg in sodium chloride 0.9 % 50 mL infusion   Intravenous Continuous PRN 6.1 mL/hr at 05/08/20 1147 0.05 mcg/kg/min at 05/08/20 1147     [Auto Hold] pantoprazole (PROTONIX) EC tablet 40 mg  40 mg Oral QAM AC   Stopped at 05/08/20 0730     Plasma-Lyte A (electrolyte A) solution    Continuous PRN         Plasma-Lyte A (electrolyte A) solution    Continuous PRN 25 mL/hr at 05/08/20 1136       potassium chloride 11.5 mEq, mannitol  25 % 7.25 g, magnesium sulfate 1.16 g, sodium bicarbonate 14.5 mEq in sodium chloride 0.9 % CARDIOPLEGIA solution   PERFUSION Once         potassium chloride 60 mEq, mannitol 25 % 7.5 g, magnesium sulfate 1.2 g, sodium bicarbonate 15 mEq in sodium chloride 0.9 % CARDIOPLEGIA solution   PERFUSION Once         propofol (DIPRIVAN) injection 10 mg/mL vial   Intravenous PRN   70 mg at 05/08/20 0818     rocuronium injection   Intravenous PRN   50 mg at 05/08/20 1123     [Auto Hold] sodium chloride (PF) 0.9% PF flush 3 mL  3 mL Intracatheter q1 min prn         [Auto Hold] sodium chloride (PF) 0.9% PF flush 3 mL  3 mL Intracatheter Q8H   3 mL at 05/08/20 0258     sodium chloride 0.9% infusion   Intravenous Continuous         [Auto Hold] spironolactone (ALDACTONE) tablet 25 mg  25 mg Oral Daily   Stopped at 05/08/20 0800     No current Epic-ordered outpatient medications on file.             Review of Systems:   The 10 point Review of Systems is negative other than noted in the HPI            Physical Exam:   Vitals were reviewed  Temp: 98.2  F (36.8  C) Temp src: Oral BP: 114/79 Pulse: 87 Heart Rate: 85 Resp: 20 SpO2: 99 % O2 Device: None (Room air) Oxygen Delivery: 2 LPM    Head and neck exam:    Intraoral exam:       Data:   Radiographic interpretation: Orthopantomogram taken on 5/5/2020  Osseous pathology: none  Pulpal Pathology: Pt has periapical radiolucencies on first premolars bilaterally, maxillary canines bilaterally, root tip of maxillary left later incisor and mandibular right first premolar.  Caries: Gross caries on all remaining teeth  Odontogenic pathology: none    The patient was discussed with: Dr. Yael Ocasio DMD  [address]  [facility] [place] [address]  PGY1  Pager: [phone]

## 2020-05-08 NOTE — ANESTHESIA PROCEDURE NOTES
Arterial Line Procedure Note  Staff -   Anesthesiologist:  Will Garber MD  Resident/Fellow: Denis Liu MD    Performed By: fellow          Location: In OR After Induction  Procedure Start/Stop Times:     patient identified, IV checked, site marked, risks and benefits discussed, informed consent, monitors and equipment checked, pre-op evaluation and at physician/surgeon's request      Correct Patient: Yes      Correct Position: Yes      Correct Site: Yes      Correct Procedure: Yes      Correct Laterality:  Yes    Site Marked:  N/A  Line Placement:     Procedure:  Arterial Line    Insertion Site:  Radial    Insertion laterality:  Left    Skin Prep: Chloraprep      Patient Prep: patient draped, mask, sterile gloves, hat and hand hygiene      Local skin infiltration:  2% lidocaine    amount (mL):  3    Ultrasound Guided?: Yes      Artery evaluated via ultrasound confirming patency.   Using realtime imaging, the artery was punctured and the needle was observed entering the artery.      A permanent image is entered into patient's chart.      Catheter size:  20 gauge, 12 cm    Cath secured with: suture      Dressing:  Tegaderm    Complications:  None obvious    Arterial waveform: Yes      IBP within 10% of NIBP: Yes

## 2020-05-08 NOTE — PROGRESS NOTES
Cardiology Progress Note  5/6/2020      ASSESSMENT/PLAN:  Rudi Schilling is a 70 year old male with a PMH significant for morbid obesity and recently diagnosed atrial fibrillation, multivessel CAD and biventricular HFrEF (EF 10% on 4/21/2020 TTE) who was transferred from an OSH on 5/5/2020 for further management and evaluation for possible revascularization.    Changes Today:  - Plan for CABG today  - discussed with Anesthesology about advancing IABP as it 7cm away from aortic arch and refloating PA catheter as PA catheter sheath popped off  - Will reassess patient after CABG     # Severe biventricular HFrEF, NYHA CLASS II, CHF STAGE D  # ICM with multivessel CAD  Most recent echo on 4/21/2020 showed EF of 10%, and coronary angiogram on 4/28/2020 revealed multivessel CAD.  Transferred to [facility] for further management and evaluation for revascularization. Patient denies any complaints at this time. He reports mild symptoms of SOB with exertion but denies chest pain or palpitations. He appears euvolemic on exam. TTE from 5/6 showed moderate diffuse hypokinesis LVEF 30-35%. Plan for CABG today   - ACEI/ARB: losartan 100mg daily  - Beta Blocker: 6.25mg BID  - Aldosterone Antagonist: spironolactone 25mg daily  - Diuresis: Appears euvolemic,continue lasix 40mg po daily   - Telemetry  - Strict Is/Os, daily weights, cardiac diet, fluid restriction (<1.5L daily)  - CVTS consulted, appreciate recs   - Plan for OR on Friday for CABG, IABP placement today      # atrial fibrillation  CHADSVASC of 3 (age, vascular disease, and CHF).  Rates of 90s on admission.  - telemetry  - continue carvedilol 6.25mg BID and digoxin 125mcg daily  - heparin gtt for anticoagulation, holding PTA elliquis     # Positive BCx w/ staph hominis, suspect contamination  Patient was noted to spike a fever on 4/29 at the OSH and was started on vancomycin/ceftriaxone after 1 of 2 blood cultures initially grew GPCs which eventually grew out staph hominis.  "Repeat BCx of  were negative for >24hours at time of discharge per chart review and antibiotics were discontinued. COVID negative  - repeat blood cultures at our facility NGTD   - will hold off on antibiotics at this time and monitor for fevers     CHRONIC MEDICAL PROBLEMS  # Chronic back pain: continue PTA cyclobenzaprine PRN  # GERD: continue PTA pantoprazole    FEN:Cardiac Diet  Ppx: Heparin gtt  Code: Full  Dispo: Admit to cardiology    Plan d/w Dr. Odin M.D., who is in agreement. Please, see staff addendum for changes/additions to the plan.    Bridget Ewing MD  Cardiology Fellow   PGY4        ===================================================================    SUBJECTIVE:   - Plan for CABG today  - Will go down with IABP, alerted anesthesiology about IABP needing to be advanced     OBJECTIVE:  /79   Pulse 87   Temp 98.2  F (36.8  C) (Oral)   Resp 20   Ht 1.778 m (5' 10\")   Wt 126.6 kg (279 lb 1.6 oz)   SpO2 99%   BMI 40.05 kg/m    Temp (24hrs), Av.6  F (37  C), Min:98.2  F (36.8  C), Max:98.9  F (37.2  C)      I/O:    Intake/Output Summary (Last 24 hours) at 2020 1206  Last data filed at 2020 0659  Gross per 24 hour   Intake 1222.65 ml   Output 1800 ml   Net -577.35 ml       Wt:   Wt Readings from Last 5 Encounters:   20 126.6 kg (279 lb 1.6 oz)       GEN: pleasant, no acute distress  HEENT: no icterus  CV: RRR, normal s1/s2, no murmurs/rubs/s3/s4, no heave. Unable to assess JVP.   CHEST: clear to ausculation bilaterally, no rales or wheezing  ABD: soft, obese, non-tender, normal active bowel sounds  EXTR: Trace edema bilaterally  NEURO: alert oriented, speech fluent/appropriate, motor grossly nonfocal    Labs: reviewed in EMR  CBC  Recent Labs   Lab 20  1321 20  1252 20  1212 20  1141  20  0544 20  0612 20  0407 20  1244   WBC  --   --   --   --   --  9.5 8.7 8.4 9.1   RBC  --   --   --   --   --  4.57 4.37* 4.25* 4.49 "   HGB 11.1* 11.3* 10.6* 10.5*   < > 13.1* 12.6* 12.2* 12.8*   HCT  --   --   --   --   --  43.1 41.6 41.5 43.4   MCV  --   --   --   --   --  94 95 98 97   MCH  --   --   --   --   --  28.7 28.8 28.7 28.5   MCHC  --   --   --   --   --  30.4* 30.3* 29.4* 29.5*   RDW  --   --   --   --   --  14.1 14.3 14.3 14.2   PLT  --   --   --   --   --  264 289 278 244    < > = values in this interval not displayed.     [facility]  Recent Labs   Lab 05/08/20  1321 05/08/20  1252 05/08/20  1212 05/08/20  1141  05/08/20  0544 05/07/20  0612 05/06/20  0407 05/05/20  1823    139 140 141   < > 137 138 136 138   POTASSIUM 5.9* 5.8* 5.8* 4.2   < > 4.6 4.4 4.8 4.8   CHLORIDE  --   --   --   --   --  102 102 103 102   CO2  --   --   --   --   --  30 29 31 31   ANIONGAP  --   --   --   --   --  5 7 3 5   * 190* 164* 148*   < > 122* 114* 108* 121*   BUN  --   --   --   --   --  16 16 16 15   CR  --   --   --   --   --  0.94 0.86 1.00 0.82   GFRESTIMATED  --   --   --   --   --  83 89 77 >90   GFRESTBLACK  --   --   --   --   --  >90 >90 89 >90   SIDRA  --   --   --   --   --  9.2 9.3 9.3 9.3   MAG  --   --   --   --   --   --  2.1 2.1 1.9   PHOS  --   --   --   --   --   --  3.6 3.6  --     < > = values in this interval not displayed.     Troponins:   No results found for: TROPI, TROPONIN, TROPR, TROPN  INR  Recent Labs   Lab 05/07/20  1315 05/07/20  0612 05/06/20  0407   INR 1.18* 1.20* 1.13       Imaging: reviewed in EMR.    I have reviewed today's vital signs, notes, medications, labs and imaging.  I have also seen and examined the patient and agree with the findings and plan as outlined above.  Pt with 3V CABG on low dose pressors with satisfactory hemodynamics.  Agree with continued afterload reduction and weaning of pressors.  Will ultimately need aggressive ARB, BB and diuretic therapy.  Plan discussed with team.  Pt seen X2 for total critical care tme 30 min.     Tapan Newby MD, PhD  Professor, Heart Failure and Cardiac  Transplantation  [facility]

## 2020-05-08 NOTE — ANESTHESIA PROCEDURE NOTES
PA Catheter Insertion Note    Staff -   Anesthesiologist:  Will Garber MD  Resident/Fellow: Denis Liu MD    Performed By: fellow          Introducer: Introducer already in place.   Skin prep:  Chloraprep Cap, Full body drape, hand hygiene, Mask, Sterile gloves and Sterile gown    PA Catheter type:  CCO    Distance catheter advanced:  58 cm.    Appropriate RA, RV, PA  waveforms?: Yes    Dressing:  Biopatch and Tegaderm    Complications:  None apparent

## 2020-05-08 NOTE — H&P
CV ICU ADMISSION NOTE  05/08/2020      PRIMARY TEAM: CVTS   PRIMARY PHYSICIAN: Dr. Holloway   REASON FOR CRITICAL CARE ADMISSION: Hemodynamic monitoring and support   ADMITTING PHYSICIAN: Chapis Andres   Date of Service (when I saw the patient): 05/08/2020    ASSESSMENT: Rudi Schilling is a 70 year old male with PMH morbid obesity (BMI 40) and recently diagnosed atrial fibrillation and multivessel CAD with biventricular HFrEF (EF 10%). Transferred from OSH on 5/5/20 for management and revascularization. Underwent CABG with Dr. Holloway on 5/8. Admitted to the CVICU for hemodynamic monitoring and ventilatory support. Might be extubated tonight depending on hemodynamics and respiratory status.     PLAN:    Neurological:  # Intubated  - Monitor neurological status. Delirium preventions and precautions.   - Pain:  Acetaminophen Q 8 hrs. Prn dilaudid and oxycodone  - Sedation plan: propofol and fentanyl      Pulmonary:   # Post operative mechanical ventilation   - VENT: CMV. Continue full vent support. PST when meets criteria.  Ventilatory bundle. HOB elevation   - Supplemental oxygen to keep saturation above 92 %.  - Incentive spirometer every 15- 30 minutes when awake.    Cardiovascular:    # Severe biventricular HFrEF   # Ischemic cardiomyopathy with multivessel CAD s/p CABG   # Atrial fibrillation   - Monitor hemodynamic status. .   - wean pressors as able, on epinephrine   - IABP in place, might be able to remove it tonight.    Gastroenterology/Nutrition:  # Post operative nutrition   # h/o GERD   - POD #0. Will evaluted need for FT tomorrow POD 1.   - No indication for parenteral nutrition.    Fluids/Electrolytes:   # No acute issues   -   IV fluid hydration    Renal:  # No acute issues   - Urine output is adequate . Will continue to monitor intake and output.      Endocrine:  # Perioperative hyperglycemia  - insulin infusion for glucose management. Goal to keep BG< 180 for optimal wound healing     ID:  #  Positive Blood culture w/ staph hominis, suspected contaminations  # Perioperative antibiotics   - Spiked fever on 4/29; teams closely following white count, body tem. So far doing well off antibiotics.   - no indications for antibiotics.      Heme:     # Post operative anemia  - Transfuse if hgb <7.0 or signs/symptoms of hypoperfusion. Monitor and trend.     Musculoskeletal:  # H/o chronic back pain  - Holding PTA cyclobenzaprine PRN    Skin:  # No acute issues     General Cares/Prophylaxis:    DVT Prophylaxis: Pneumatic Compression Devices, HOlding chemical DVT POD #0   GI Prophylaxis: PPI  Restraints: Restraints for medical healing needed: NO    Lines/ tubes/ drains:  - ETT, OG, Right internal jugular MAC with PA catheter, arterial line, molina catheter, PIV x2, Chest tube/drains x 4, IABP in place right femoral artery     Disposition:  - CV ICU.     Patient seen, findings and plan discussed with CV ICU staff, Dr. Chapis Andres.    Time Spent on this Encounter   Billing:  I spent 50 minutes bedside and on the inpatient unit today managing the critical care of Rudi Schilling in relation to the issues listed in this note.      Fer Banuelos  - - - - - - - - - - - - - - - - - - - - - - - - - - - - - - - - - - - - - - - - - - - - - -   HISTORY PRESENTING ILLNESS:   Mr. Schilling is a 70 year old male with PMH significant for morbid obesity who earlier in April presented to his primary care provide with new onset atrial fibrillation. Further workup revealing biventricular HFrEF (EF 10%) and clinically in heart failure. Patient under went diagnostic coronary angiogram on 4/28 showing multivessel coronary artery disease; transferred to [facility] per CT surgery recommendations on having revascularization surgery at a facility with LVAD/ECMO support. He was admitted to [facility] on 5/5/20 under the care of the cardiology team. His medical management was optimized and he got an IABP on 5/6 by CVTS. Underwent a high risk CABG  with Dr. Holloway on 5/8. Admitted to the CVICU for post operative hemodynamic support and monitoring.     REVIEW OF SYSTEMS: 10 point ROS neg other than the symptoms noted above in the HPI.    PAST MEDICAL HISTORY:   No past medical history on file.    SURGICAL HISTORY:   No past surgical history on file.    SOCIAL HISTORY:   Social History     Socioeconomic History     Marital status: Single     Spouse name: Not on file     Number of children: Not on file     Years of education: Not on file     Highest education level: Not on file   Occupational History     Not on file   Social Needs     Financial resource strain: Not on file     Food insecurity     Worry: Not on file     Inability: Not on file     Transportation needs     Medical: Not on file     Non-medical: Not on file   Tobacco Use     Smoking status: Not on file   Substance and Sexual Activity     Alcohol use: Not on file     Drug use: Not on file     Sexual activity: Not on file   Lifestyle     Physical activity     Days per week: Not on file     Minutes per session: Not on file     Stress: Not on file   Relationships     Social connections     Talks on phone: Not on file     Gets together: Not on file     Attends [employer] service: Not on file     Active member of club or organization: Not on file     Attends meetings of clubs or organizations: Not on file     Relationship status: Not on file     Intimate partner violence     Fear of current or ex partner: Not on file     Emotionally abused: Not on file     Physically abused: Not on file     Forced sexual activity: Not on file   Other Topics Concern     Not on file   Social History Narrative     Not on file     FAMILY HISTORY: unknown     ALLERGIES:   No Known Allergies    MEDICATIONS:  No current facility-administered medications on file prior to encounter.   No current outpatient medications on file prior to encounter.      PHYSICAL EXAMINATION:  Temp:  [98  F (36.7  C)-98.4  F (36.9  C)] 98.2  F (36.8   C)  Pulse:  [] 87  Heart Rate:  [] 103  Resp:  [13-22] 13  BP: (108-148)/() 114/79  MAP:  [76 mmHg-77 mmHg] 76 mmHg  Arterial Line BP: ()/(63-66) 103/63  FiO2 (%):  [50 %] 50 %  SpO2:  [93 %-100 %] 100 %  General: obese, in bed, intubated immobile.   HEENT:ETT in oral cavity, no bleeding observed.  Neck:thick neck, with right internal jugular line in place.   Neuro: RASS -3  Pulm/Resp: Clear breath sounds bilaterally without rhonchi, crackles or wheeze,  breathing non-labored  CV: audible heart sounds, rhythmic. Chest tubes coming from his lower chest.   Abdomen: Soft, non-distended, non-tender, no rebound tenderness or guarding, no masses  : molina catheter in place, urine yellow and clear  Incisions/Skin: incisions covered with clear dry dressings.   MSK/Extremities: mild peripheral edema,  extremities well perfused. Left lower extremity with dressings over lower extremity venous graft. With right femoral IABP in place.     LABS: Reviewed.   Arterial Blood Gases   Recent Labs   Lab 05/08/20  1528 05/08/20  1415 05/08/20  1338 05/08/20  1321   PH 7.38 7.40 7.40 7.36   PCO2 45 43 44 52*   PO2 82 188* 103 239*   HCO3 27 27 27 29*     Complete Blood Count   Recent Labs   Lab 05/08/20  1528 05/08/20  1415 05/08/20  1341 05/08/20  1338 05/08/20  1321  05/08/20  0544 05/07/20  0612 05/06/20  0407   WBC 19.5*  --   --   --   --   --  9.5 8.7 8.4   HGB 12.7* 12.1*  --  10.9* 11.1*   < > 13.1* 12.6* 12.2*     --  169  --   --   --  264 289 278    < > = values in this interval not displayed.     Basic Metabolic Panel  Recent Labs   Lab 05/08/20  1528 05/08/20  1415 05/08/20  1338 05/08/20  1321  05/08/20  0544 05/07/20  0612 05/06/20  0407    138 139 139   < > 137 138 136   POTASSIUM 4.6 5.0 5.3 5.9*   < > 4.6 4.4 4.8   CHLORIDE 106  --   --   --   --  102 102 103   CO2 27  --   --   --   --  30 29 31   BUN 20  --   --   --   --  16 16 16   CR 1.04  --   --   --   --  0.94 0.86 1.00    * 207* 197* 187*   < > 122* 114* 108*    < > = values in this interval not displayed.     Liver Function Tests  Recent Labs   Lab 05/08/20  1528 05/08/20  1341 05/08/20  0544 05/07/20  1315 05/07/20  0612 05/06/20  0407   AST 42  --  27  --  19 19   ALT 27  --  32  --  28 28   ALKPHOS 40  --  46  --  42 41   BILITOTAL 1.0  --  0.7  --  0.6 0.6   ALBUMIN 2.7*  --  3.1*  --  3.1* 2.8*   INR 1.41* 1.85*  --  1.18* 1.20* 1.13     Pancreatic Enzymes  No lab results found in last 7 days.  Coagulation Profile  Recent Labs   Lab 05/08/20  1528 05/08/20  1341 05/07/20  1315 05/07/20  0612   INR 1.41* 1.85* 1.18* 1.20*   PTT 32 34 70*  --        IMAGING:  Recent Results (from the past 24 hour(s))   Cardiac Catheterization    Narrative      Right sided filling pressures are moderately elevated.    Moderately elevated pulmonary artery hypertension.    Left sided filling pressures are moderately elevated.    Reduced cardiac output level.    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      TECHNIQUE: 3-D reconstruction by the technologists, with curved  multiplanar reformat of thin section imaging through the mandible and  maxilla obtained without intravenous contrast.    FINDINGS:  No significant soft tissue swelling or mass. Normal facial bone  alignment. No bony erosion. There are periapical lucencies of the  bilateral maxillary first cuspids and of the left lateral incisor.  Dental caries of all remaining teeth mild periapical lucency  surrounding the remaining left maxillary molar root. There is  periapical lucency surrounding the left lateral mandibular incisor. No  evidence for soft tissue abscess.    Visualized portions of the paranasal sinuses are clear. Normal  temporomandibular joints.  Mastoid air cells are clear.    Right occipital encephalomalacia is partially visualized.      Impression    IMPRESSION:  Carious dentition with multiple foci of periapical lucency as  described above. No evidence for soft tissue swelling or abscess.    MARY MAN MD   XR Chest Port 1 View    Narrative     Examination:  XR CHEST PORT 1 VW     Date:  5/7/2020 8:32 PM      Clinical Information: s/p IABP, PA cath placement     Additional Information: none    Comparison: 5/7/2020 CT of the chest.    Findings:     The IABP marker appears low in the aorta and is approximately 7 cm  lower than the expected position.    There is a right IJ [place]-Davy catheter and the tip projected in the  main outflow tract of the pulmonary artery. The lungs are clear. No  pleural effusions are present. There is mild aortic tortuosity and  borderline cardiomegaly.      Impression    Impression:    1. IABP marker low in the aorta, proxy 7 cm below the typical aortic  arch position.  2. Lungs are clear..    YAAKOV JAIMES MD

## 2020-05-08 NOTE — PLAN OF CARE
6MWT orders received. Pt OOR in OR today and now has bed rest orders for balloon pump, 6MWT unable to be completed. Will reschedule and complete if needed/when appropriate.

## 2020-05-08 NOTE — ANESTHESIA POSTPROCEDURE EVALUATION
Anesthesia POST Procedure Evaluation    Patient: Rudi Schilling   MRN:     [id number] Gender:   male   Age:    70 year old :      1950        Preoperative Diagnosis: CAD (coronary artery disease) [I25.10]   Procedure(s):  Median sternotomy.  Coronary artery bypass grafts x3 using harvested left internal mammary artery and left endoscopically harvested greater saphenous vein. Bilateral  Pulmonary vein isolation using atricure.  Left atrial appendage ligation using Atricure clip size 45.  Cardiopulmonary bypass.  Intraoperative transesophageal echocardiogram per anesthsia   Postop Comments: No value filed.     Anesthesia Type: General       Disposition: ICU            ICU Sign Out: Anesthesiologist/ICU physician sign out WAS performed   Postop Pain Control:            Sign Out: Well controlled pain   PONV: No   Neuro/Psych: Uneventful            Sign Out: Acceptable/Baseline neuro status   Airway/Respiratory: Uneventful            Sign Out: AIRWAY IN SITU/Resp. Support               Airway in situ/Resp. Support: ETT                 Reason: Planned Pre-op   CV/Hemodynamics: Uneventful            Sign Out: Acceptable CV status   Other NRE: NONE   DID A NON-ROUTINE EVENT OCCUR?          Last Anesthesia Record Vitals:  CRNA VITALS  2020 1434 - 2020 1534      2020             Ht Rate:  (!) 0          Last PACU Vitals:  Vitals Value Taken Time   BP     Temp     Pulse     Resp     SpO2 100 % 2020  3:39 PM   Temp src     NIBP     Pulse     SpO2     Resp     Temp     Ht Rate 0 2020  3:26 PM   Temp 2     Vitals shown include unvalidated device data.      Electronically Signed By: Will Garber MD, May 8, 2020, 3:40 PM

## 2020-05-08 NOTE — ANESTHESIA PROCEDURE NOTES
MICHAEL Probe Insertion Note:  Staff -   Anesthesiologist:  Will Garber MD  Resident/Fellow: Denis Liu MD    Performed By: fellow        Probe Number: [id number]  Probe Status PRE Insertion: NO obvious damage  Probe type:  Adult 3D    Bite block used:   Yes  Insertion Technique: Easy, no oropharyngeal manipulation  Insertion complications: None obvious    Billing Report:MICHAEL report by Anesthesiologist (See Separate Report note)    Probe Status POST Removal: NO obvious damage

## 2020-05-09 ENCOUNTER — APPOINTMENT (OUTPATIENT)
Dept: GENERAL RADIOLOGY | Facility: CLINIC | Age: 70
DRG: 234 | End: 2020-05-09
Attending: SURGERY
Payer: MEDICARE

## 2020-05-09 ENCOUNTER — APPOINTMENT (OUTPATIENT)
Dept: PHYSICAL THERAPY | Facility: CLINIC | Age: 70
DRG: 234 | End: 2020-05-09
Attending: SURGERY
Payer: MEDICARE

## 2020-05-09 ENCOUNTER — APPOINTMENT (OUTPATIENT)
Dept: OCCUPATIONAL THERAPY | Facility: CLINIC | Age: 70
DRG: 234 | End: 2020-05-09
Attending: SURGERY
Payer: MEDICARE

## 2020-05-09 LAB
ABO + RH BLD: NORMAL
ABO + RH BLD: NORMAL
ALBUMIN SERPL-MCNC: 3.3 G/DL (ref 3.4–5)
ALBUMIN UR-MCNC: 10 MG/DL
ALP SERPL-CCNC: 34 U/L (ref 40–150)
ALT SERPL W P-5'-P-CCNC: 21 U/L (ref 0–70)
ANION GAP SERPL CALCULATED.3IONS-SCNC: 6 MMOL/L (ref 3–14)
APPEARANCE UR: CLEAR
AST SERPL W P-5'-P-CCNC: 36 U/L (ref 0–45)
BASE EXCESS BLDA CALC-SCNC: 1.4 MMOL/L
BASE EXCESS BLDA CALC-SCNC: 3 MMOL/L
BASE EXCESS BLDV CALC-SCNC: 1.3 MMOL/L
BASE EXCESS BLDV CALC-SCNC: 4.2 MMOL/L
BILIRUB SERPL-MCNC: 0.6 MG/DL (ref 0.2–1.3)
BILIRUB UR QL STRIP: NEGATIVE
BLD GP AB SCN SERPL QL: NORMAL
BLOOD BANK CMNT PATIENT-IMP: NORMAL
BUN SERPL-MCNC: 20 MG/DL (ref 7–30)
CA-I BLD-MCNC: 4.9 MG/DL (ref 4.4–5.2)
CALCIUM SERPL-MCNC: 8.6 MG/DL (ref 8.5–10.1)
CHLORIDE SERPL-SCNC: 108 MMOL/L (ref 94–109)
CO2 SERPL-SCNC: 27 MMOL/L (ref 20–32)
COLOR UR AUTO: YELLOW
CREAT SERPL-MCNC: 0.96 MG/DL (ref 0.66–1.25)
ERYTHROCYTE [DISTWIDTH] IN BLOOD BY AUTOMATED COUNT: 14.2 % (ref 10–15)
GFR SERPL CREATININE-BSD FRML MDRD: 81 ML/MIN/{1.73_M2}
GLUCOSE BLDC GLUCOMTR-MCNC: 111 MG/DL (ref 70–99)
GLUCOSE BLDC GLUCOMTR-MCNC: 118 MG/DL (ref 70–99)
GLUCOSE BLDC GLUCOMTR-MCNC: 145 MG/DL (ref 70–99)
GLUCOSE BLDC GLUCOMTR-MCNC: 146 MG/DL (ref 70–99)
GLUCOSE BLDC GLUCOMTR-MCNC: 147 MG/DL (ref 70–99)
GLUCOSE BLDC GLUCOMTR-MCNC: 80 MG/DL (ref 70–99)
GLUCOSE SERPL-MCNC: 147 MG/DL (ref 70–99)
GLUCOSE UR STRIP-MCNC: NEGATIVE MG/DL
HCO3 BLD-SCNC: 27 MMOL/L (ref 21–28)
HCO3 BLD-SCNC: 29 MMOL/L (ref 21–28)
HCO3 BLDV-SCNC: 27 MMOL/L (ref 21–28)
HCO3 BLDV-SCNC: 30 MMOL/L (ref 21–28)
HCT VFR BLD AUTO: 37.7 % (ref 40–53)
HGB BLD-MCNC: 11.4 G/DL (ref 13.3–17.7)
HGB UR QL STRIP: ABNORMAL
HYALINE CASTS #/AREA URNS LPF: 3 /LPF (ref 0–2)
INTERPRETATION ECG - MUSE: NORMAL
INTERPRETATION ECG - MUSE: NORMAL
KETONES UR STRIP-MCNC: NEGATIVE MG/DL
LEUKOCYTE ESTERASE UR QL STRIP: ABNORMAL
LMWH PPP CHRO-ACNC: <0.1 IU/ML
MAGNESIUM SERPL-MCNC: 2.5 MG/DL (ref 1.6–2.3)
MCH RBC QN AUTO: 28.6 PG (ref 26.5–33)
MCHC RBC AUTO-ENTMCNC: 30.2 G/DL (ref 31.5–36.5)
MCV RBC AUTO: 95 FL (ref 78–100)
MUCOUS THREADS #/AREA URNS LPF: PRESENT /LPF
NITRATE UR QL: NEGATIVE
O2/TOTAL GAS SETTING VFR VENT: 40 %
O2/TOTAL GAS SETTING VFR VENT: 40 %
O2/TOTAL GAS SETTING VFR VENT: ABNORMAL %
O2/TOTAL GAS SETTING VFR VENT: ABNORMAL %
OXYHGB MFR BLD: 90 % (ref 92–100)
OXYHGB MFR BLD: 96 % (ref 92–100)
OXYHGB MFR BLDV: 57 %
OXYHGB MFR BLDV: 59 %
PCO2 BLD: 43 MM HG (ref 35–45)
PCO2 BLD: 48 MM HG (ref 35–45)
PCO2 BLDV: 49 MM HG (ref 40–50)
PCO2 BLDV: 51 MM HG (ref 40–50)
PH BLD: 7.39 PH (ref 7.35–7.45)
PH BLD: 7.4 PH (ref 7.35–7.45)
PH BLDV: 7.36 PH (ref 7.32–7.43)
PH BLDV: 7.38 PH (ref 7.32–7.43)
PH UR STRIP: 5 PH (ref 5–7)
PHOSPHATE SERPL-MCNC: 4.2 MG/DL (ref 2.5–4.5)
PLATELET # BLD AUTO: 222 10E9/L (ref 150–450)
PO2 BLD: 59 MM HG (ref 80–105)
PO2 BLD: 97 MM HG (ref 80–105)
PO2 BLDV: 33 MM HG (ref 25–47)
PO2 BLDV: 34 MM HG (ref 25–47)
POTASSIUM SERPL-SCNC: 4.7 MMOL/L (ref 3.4–5.3)
PROT SERPL-MCNC: 6.3 G/DL (ref 6.8–8.8)
RBC # BLD AUTO: 3.99 10E12/L (ref 4.4–5.9)
RBC #/AREA URNS AUTO: 8 /HPF (ref 0–2)
SODIUM SERPL-SCNC: 141 MMOL/L (ref 133–144)
SOURCE: ABNORMAL
SP GR UR STRIP: 1.02 (ref 1–1.03)
SPECIMEN EXP DATE BLD: NORMAL
TRANS CELLS #/AREA URNS HPF: <1 /HPF (ref 0–1)
UROBILINOGEN UR STRIP-MCNC: NORMAL MG/DL (ref 0–2)
WBC # BLD AUTO: 14 10E9/L (ref 4–11)
WBC #/AREA URNS AUTO: 7 /HPF (ref 0–5)

## 2020-05-09 PROCEDURE — 40000048 ZZH STATISTIC DAILY SWAN MONITORING

## 2020-05-09 PROCEDURE — 71045 X-RAY EXAM CHEST 1 VIEW: CPT

## 2020-05-09 PROCEDURE — 97165 OT EVAL LOW COMPLEX 30 MIN: CPT | Mod: GO

## 2020-05-09 PROCEDURE — 93005 ELECTROCARDIOGRAM TRACING: CPT

## 2020-05-09 PROCEDURE — 21400000 ZZH R&B CCU UMMC

## 2020-05-09 PROCEDURE — 82805 BLOOD GASES W/O2 SATURATION: CPT | Performed by: STUDENT IN AN ORGANIZED HEALTH CARE EDUCATION/TRAINING PROGRAM

## 2020-05-09 PROCEDURE — 25800030 ZZH RX IP 258 OP 636: Performed by: SURGERY

## 2020-05-09 PROCEDURE — 83735 ASSAY OF MAGNESIUM: CPT | Performed by: INTERNAL MEDICINE

## 2020-05-09 PROCEDURE — 97530 THERAPEUTIC ACTIVITIES: CPT | Mod: GO

## 2020-05-09 PROCEDURE — 81001 URINALYSIS AUTO W/SCOPE: CPT

## 2020-05-09 PROCEDURE — 25000132 ZZH RX MED GY IP 250 OP 250 PS 637: Mod: GY | Performed by: SURGERY

## 2020-05-09 PROCEDURE — 86900 BLOOD TYPING SEROLOGIC ABO: CPT | Performed by: THORACIC SURGERY (CARDIOTHORACIC VASCULAR SURGERY)

## 2020-05-09 PROCEDURE — 93010 ELECTROCARDIOGRAM REPORT: CPT | Mod: 59 | Performed by: INTERNAL MEDICINE

## 2020-05-09 PROCEDURE — 25000125 ZZHC RX 250: Performed by: SURGERY

## 2020-05-09 PROCEDURE — 85520 HEPARIN ASSAY: CPT | Performed by: INTERNAL MEDICINE

## 2020-05-09 PROCEDURE — 87040 BLOOD CULTURE FOR BACTERIA: CPT

## 2020-05-09 PROCEDURE — 86901 BLOOD TYPING SEROLOGIC RH(D): CPT | Performed by: THORACIC SURGERY (CARDIOTHORACIC VASCULAR SURGERY)

## 2020-05-09 PROCEDURE — 85027 COMPLETE CBC AUTOMATED: CPT | Performed by: INTERNAL MEDICINE

## 2020-05-09 PROCEDURE — 80053 COMPREHEN METABOLIC PANEL: CPT | Performed by: INTERNAL MEDICINE

## 2020-05-09 PROCEDURE — C9113 INJ PANTOPRAZOLE SODIUM, VIA: HCPCS | Performed by: SURGERY

## 2020-05-09 PROCEDURE — 36415 COLL VENOUS BLD VENIPUNCTURE: CPT

## 2020-05-09 PROCEDURE — 40000014 ZZH STATISTIC ARTERIAL MONITORING DAILY

## 2020-05-09 PROCEDURE — 25000132 ZZH RX MED GY IP 250 OP 250 PS 637: Mod: GY | Performed by: STUDENT IN AN ORGANIZED HEALTH CARE EDUCATION/TRAINING PROGRAM

## 2020-05-09 PROCEDURE — 97530 THERAPEUTIC ACTIVITIES: CPT | Mod: GP

## 2020-05-09 PROCEDURE — 84100 ASSAY OF PHOSPHORUS: CPT | Performed by: INTERNAL MEDICINE

## 2020-05-09 PROCEDURE — 86850 RBC ANTIBODY SCREEN: CPT | Performed by: THORACIC SURGERY (CARDIOTHORACIC VASCULAR SURGERY)

## 2020-05-09 PROCEDURE — 00000146 ZZHCL STATISTIC GLUCOSE BY METER IP

## 2020-05-09 PROCEDURE — 40000275 ZZH STATISTIC RCP TIME EA 10 MIN

## 2020-05-09 PROCEDURE — 97162 PT EVAL MOD COMPLEX 30 MIN: CPT | Mod: GP

## 2020-05-09 PROCEDURE — 82805 BLOOD GASES W/O2 SATURATION: CPT | Performed by: SURGERY

## 2020-05-09 PROCEDURE — 25000128 H RX IP 250 OP 636: Performed by: SURGERY

## 2020-05-09 PROCEDURE — 25000128 H RX IP 250 OP 636

## 2020-05-09 PROCEDURE — P9041 ALBUMIN (HUMAN),5%, 50ML: HCPCS

## 2020-05-09 PROCEDURE — 40000196 ZZH STATISTIC RAPCV CVP MONITORING

## 2020-05-09 PROCEDURE — 82330 ASSAY OF CALCIUM: CPT | Performed by: SURGERY

## 2020-05-09 PROCEDURE — 94003 VENT MGMT INPAT SUBQ DAY: CPT

## 2020-05-09 RX ORDER — ALBUMIN, HUMAN INJ 5% 5 %
SOLUTION INTRAVENOUS
Status: COMPLETED
Start: 2020-05-09 | End: 2020-05-09

## 2020-05-09 RX ORDER — PANTOPRAZOLE SODIUM 40 MG/1
40 TABLET, DELAYED RELEASE ORAL
Status: DISCONTINUED | OUTPATIENT
Start: 2020-05-10 | End: 2020-05-19 | Stop reason: HOSPADM

## 2020-05-09 RX ADMIN — CEFAZOLIN SODIUM 2 G: 2 INJECTION, SOLUTION INTRAVENOUS at 14:07

## 2020-05-09 RX ADMIN — HEPARIN SODIUM 5000 UNITS: 5000 INJECTION, SOLUTION INTRAVENOUS; SUBCUTANEOUS at 14:07

## 2020-05-09 RX ADMIN — GABAPENTIN 100 MG: 100 CAPSULE ORAL at 15:20

## 2020-05-09 RX ADMIN — ACETAMINOPHEN 975 MG: 325 TABLET, FILM COATED ORAL at 08:51

## 2020-05-09 RX ADMIN — DEXMEDETOMIDINE 0.2 MCG/KG/HR: 100 INJECTION, SOLUTION, CONCENTRATE INTRAVENOUS at 00:22

## 2020-05-09 RX ADMIN — ALBUMIN HUMAN 500 ML: 0.05 INJECTION, SOLUTION INTRAVENOUS at 00:24

## 2020-05-09 RX ADMIN — FUROSEMIDE 40 MG: 40 TABLET ORAL at 10:04

## 2020-05-09 RX ADMIN — DIGOXIN 125 MCG: 0.06 TABLET ORAL at 10:04

## 2020-05-09 RX ADMIN — CEFAZOLIN SODIUM 2 G: 2 INJECTION, SOLUTION INTRAVENOUS at 05:22

## 2020-05-09 RX ADMIN — ACETAMINOPHEN 975 MG: 325 TABLET, FILM COATED ORAL at 15:20

## 2020-05-09 RX ADMIN — ATORVASTATIN CALCIUM 40 MG: 40 TABLET, FILM COATED ORAL at 19:57

## 2020-05-09 RX ADMIN — ACETAMINOPHEN 975 MG: 325 TABLET, FILM COATED ORAL at 23:25

## 2020-05-09 RX ADMIN — HEPARIN SODIUM 5000 UNITS: 5000 INJECTION, SOLUTION INTRAVENOUS; SUBCUTANEOUS at 19:57

## 2020-05-09 RX ADMIN — SENNOSIDES AND DOCUSATE SODIUM 1 TABLET: 8.6; 5 TABLET ORAL at 19:58

## 2020-05-09 RX ADMIN — ASPIRIN 325 MG ORAL TABLET 325 MG: 325 PILL ORAL at 08:50

## 2020-05-09 RX ADMIN — GABAPENTIN 100 MG: 100 CAPSULE ORAL at 08:50

## 2020-05-09 RX ADMIN — PANTOPRAZOLE SODIUM 40 MG: 40 INJECTION, POWDER, FOR SOLUTION INTRAVENOUS at 08:50

## 2020-05-09 RX ADMIN — GABAPENTIN 100 MG: 100 CAPSULE ORAL at 19:57

## 2020-05-09 ASSESSMENT — ACTIVITIES OF DAILY LIVING (ADL)
ADLS_ACUITY_SCORE: 15
PREVIOUS_RESPONSIBILITIES: MEAL PREP;HOUSEKEEPING;LAUNDRY;SHOPPING;MEDICATION MANAGEMENT;FINANCES;DRIVING
ADLS_ACUITY_SCORE: 15
ADLS_ACUITY_SCORE: 14
ADLS_ACUITY_SCORE: 15

## 2020-05-09 ASSESSMENT — MIFFLIN-ST. JEOR: SCORE: 2064.25

## 2020-05-09 NOTE — PLAN OF CARE
D/settled by others, skin check done, preventive mepilex on buttocks. Right groin CDI, and left leg incision CDI, chest incision noted. PW capped and CT dressing is CDI. He says he only wants applesauce for tonight. Agreed to have me advance diet for the am. He had a CABx3 5/8 and ligated left atrial appendage. History of morbid obesity, A fib, EF 10%, He walks with 2 with walker. He has 3 CT to -20. He has voided post molina removal.   I/had him do the I/S and moved it only slightly. I told him he needs to do this 10 times every hour and followed by 2 coughs. You have a low grade fever now, and if you don't do this, surgery will want you to do it every 1/2 hour. I will remind you to do it for me whenever I come in the room  P/monitor for changes, remind to do the I/S

## 2020-05-09 NOTE — PROGRESS NOTES
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 Transferred from OSH on 5/5/20 for management and revascularization. Underwent CABG with Dr. Holloway on 5/8. Admitted to the CVICU for hemodynamic monitoring and ventilatory support. Extubated on 5/9, doing well. Weaning pressors. Likely to transfer to floor soon.       Major changes today:   - elevated body temperature 5/9. Pancultured. No need for antibiotics for now. Trending white count and body temp.   - Optimizing pain control and pulmonary hygiene   - Goal to wean pressors today   - Once off pressors; plan to resume afterload reductions meds. Starting first with losartan 50 mg (cards recs)   - Might be transferred to floor, doing very well.     PLAN:    Neurological:  # Post operative pain  - Monitor neurological status. Delirium preventions and precautions.   - Pain:  Acetaminophen Q 8 hrs. Prn dilaudid and oxycodone      Pulmonary:   # Extubated 5/9  - Supplemental oxygen to keep saturation above 92 %.  - Incentive spirometer every 15- 30 minutes when awake.    Cardiovascular:    # Severe biventricular HFrEF   # Ischemic cardiomyopathy with multivessel CAD s/p CABG   # Atrial fibrillation   - Monitor hemodynamic status.   - wean pressors as able, on epinephrine   - Holding Losartan 100 mg daily as he is on pressors. Resuming low dose once epi off.   - Daily lasix 40 mg PO   - Fluid restriction <2.0 L daily.   - Afib: Digoxin 125 mcg daily for afib, carvedilol 6.25 mg BID. Holding carvedilol still on pressors.   - Atorvastatin, ASA       Gastroenterology/Nutrition:  # Post operative nutrition   # h/o GERD   - ADAT   - Protonix 40 mg daily for GERD   - No indication for parenteral nutrition.    Fluids/Electrolytes:   # No acute issues       Renal:  # No acute issues   - Urine output is adequate  . Will continue to monitor intake and output.      Endocrine:  # Perioperative hyperglycemia  - sliding scale insulin for glucose management. Goal to keep BG< 180 for optimal wound healing     ID:  # Positive Blood culture w/ staph hominis, suspected contaminations  # Perioperative antibiotics   - Spiked fever on 4/29; teams closely following white count, body tem. So far doing well off antibiotics.   - elevated body temperature 5/9, pancultured. Holding antibiotics for now.     Heme:     # Post operative anemia  - Transfuse if hgb <7.0 or signs/symptoms of hypoperfusion. Monitor and trend.     Musculoskeletal:  # H/o chronic back pain  - Holding PTA cyclobenzaprine PRN    Skin:  # No acute issues     General Cares/Prophylaxis:    DVT Prophylaxis: Pneumatic Compression Devices, heparin subcutaneous   GI Prophylaxis: PPI  Restraints: Restraints for medical healing needed: NO    Lines/ tubes/ drains:  - Right internal jugular MAC with PA catheter, arterial line, molina catheter, PIV x2, Chest tube/drains x 4,     Disposition:  - CV ICU.     Patient seen, findings and plan discussed with CVTS fellow     Time Spent on this Encounter   Billing:  I spent 30 minutes bedside and on the inpatient unit today managing the critical care of Rudi Schilling in relation to the issues listed in this note.        Fer Banuelos   Anesthesiology resident Physician   PGY-3   - - - - - - - - - - - - - - - - - - - - - - - - - - - - - - - - - - - - - - - - - - - - - -   SUBJECTIVE: Given albumin overnight for borderline BP. This morning with higher CVP. No acute events.     PHYSICAL EXAMINATION:  Temp:  [98.6  F (37  C)-101.1  F (38.4  C)] 99.9  F (37.7  C)  Heart Rate:  [] 108  Resp:  [10-25] 20  BP: (116)/(59) 116/59  MAP:  [57 mmHg-91 mmHg] 71 mmHg  Arterial Line BP: ()/(46-70) 104/58  FiO2 (%):  [40 %-50 %] 40 %  SpO2:  [90 %-100 %] 97 %  General: morbidly obese, in bed, smiling.   HEENT: no bleeding observed, oral  cavity hydrated   Neck:thick neck, with right internal jugular line in place.   Neuro: Alert, oriented x 3, moving his 4 extremities.   Pulm/Resp: Clear breath sounds bilaterally without rhonchi, crackles or wheeze,  breathing non-labored  CV: audible heart sounds, rhythmic. Chest tubes coming from his lower chest.   Abdomen: Soft, non-distended, non-tender, no rebound tenderness or guarding, no masses  : molina catheter in place, urine yellow and clear  Incisions/Skin: incisions covered with clear dry dressings.   MSK/Extremities: mild peripheral edema,  extremities well perfused. Left lower extremity with dressings over lower extremity venous graft.     LABS: Reviewed.   Arterial Blood Gases   Recent Labs   Lab 05/09/20  0822 05/09/20  0412 05/08/20  1528 05/08/20  1415   PH 7.40 7.39 7.38 7.40   PCO2 43 48* 45 43   PO2 59* 97 82 188*   HCO3 27 29* 27 27     Complete Blood Count   Recent Labs   Lab 05/09/20 0413 05/08/20  1528 05/08/20  1415 05/08/20  1341 05/08/20  1338  05/08/20  0544 05/07/20  0612   WBC 14.0* 19.5*  --   --   --   --  9.5 8.7   HGB 11.4* 12.7* 12.1*  --  10.9*   < > 13.1* 12.6*    219  --  169  --   --  264 289    < > = values in this interval not displayed.     Basic Metabolic Panel  Recent Labs   Lab 05/09/20 0413 05/08/20  2110 05/08/20  1528 05/08/20  1415 05/08/20  1338  05/08/20  0544 05/07/20  0612     --  140 138 139   < > 137 138   POTASSIUM 4.7 4.7 4.6 5.0 5.3   < > 4.6 4.4   CHLORIDE 108  --  106  --   --   --  102 102   CO2 27  --  27  --   --   --  30 29   BUN 20  --  20  --   --   --  16 16   CR 0.96  --  1.04  --   --   --  0.94 0.86   *  --  190* 207* 197*   < > 122* 114*    < > = values in this interval not displayed.     Liver Function Tests  Recent Labs   Lab 05/09/20  0413 05/08/20  1528 05/08/20  1341 05/08/20  0544 05/07/20  1315 05/07/20  0612   AST 36 42  --  27  --  19   ALT 21 27  --  32  --  28   ALKPHOS 34* 40  --  46  --  42   BILITOTAL 0.6  1.0  --  0.7  --  0.6   ALBUMIN 3.3* 2.7*  --  3.1*  --  3.1*   INR  --  1.41* 1.85*  --  1.18* 1.20*     Pancreatic Enzymes  No lab results found in last 7 days.  Coagulation Profile  Recent Labs   Lab 05/08/20  1528 05/08/20  1341 05/07/20  1315 05/07/20  0612   INR 1.41* 1.85* 1.18* 1.20*   PTT 32 34 70*  --        IMAGING:  Recent Results (from the past 24 hour(s))   XR Chest Port 1 View    Narrative    Portable chest    INDICATION: CAB    COMPARISON: 5/7/2020    FINDINGS: Numerous tubes and supporting devices are again present,  including an endotracheal tube with its tip approximately 3.3 cm above  the gayla. Heart size remains mildly enlarged. Bibasilar atelectasis  is present, cannot exclude tiny pleural effusions. Median sternotomy  again noted. Right IJ [place]-Davy catheter is again present with tip in  the right main branch pulmonary artery. An NG/OG tube progresses  beyond the inferior margin of the image. The intra-aortic balloon pump  marker is not as well identified. Intrauterine changes of CABG  procedure and left atrial appendage ligation.      Impression    IMPRESSION: Interim median sternotomy, CABG and left atrial appendage  clipping with nonvisualization of previous intra-aortic balloon pump.  Right IJ [place]-Davy catheter with tip in right pulmonary artery.  Cardiomegaly. Bibasilar atelectasis, cannot exclude small pleural  effusions.    LEIGH FLOWERS MD   XR Abdomen Port 1 View    Narrative    Exam: XR ABDOMEN PORT 1 VW, 5/8/2020 4:43 PM    Indication: OG placement    Comparison: 5/8/2020 radiograph of the chest and abdomen    Findings:   Nonspecific prominent loops of air-filled bowel in the right abdomen.  There are multiple mediastinal drains at the superior edge of the  radiograph, notably there is one tracking along the left vertebral  border. Orogastric tube passes across the lower thoracic vertebral  bodies with tip presumably in the stomach body.      Impression    Impression:   1.  Orogastric tube tip appears to be in the stomach body.  2. Nonspecific prominent loops of bowel in the right abdomen.    I have personally reviewed the examination and initial interpretation  and I agree with the findings.    YAAKOV JAIMES MD   XR Abdomen Port 1 View    Narrative    Exam: XR ABDOMEN PORT 1 VW, 5/8/2020 4:43 PM    Indication: OG placement    Comparison: 5/8/2020 radiograph of the chest and abdomen    Findings:   Nonspecific prominent loops of air-filled bowel in the right abdomen.  Incompletely visualized [place]-Davy catheter. There are multiple  mediastinal drains at the superior edge of the radiograph, notably  there is one tracking along the left vertebral border. There is also  left basilar chest tube. Orogastric tube passes across the lower  thoracic vertebral bodies with tip presumably in the distal stomach.      Impression    Impression:   1. Orogastric tube tip appears to be in the distal stomach.  2. Nonspecific prominent loops of bowel in the right abdomen.    I have personally reviewed the examination and initial interpretation  and I agree with the findings.    YAAKOV JAIMES MD   XR Abdomen Port 1 View    Narrative    XR ABDOMEN PORT 1 VW  5/8/2020 9:25 PM      HISTORY: OG - placement    COMPARISON: 5/8/2020    FINDINGS: Supine views of the abdomen. OG tube tip and sidehole  project over the expected location of the stomach body. Partially  visualized chest tubes, mediastinal drains, and [place]-Davy catheter.  Nonobstructive bowel gas pattern. No free air, pneumatosis, or portal  venous gas.      Impression    IMPRESSION: OG tube tip and sidehole project over the expected  location of stomach body.    I have personally reviewed the examination and initial interpretation  and I agree with the findings.    YAAKOV JAIMES MD   XR Chest Port 1 View    Narrative    XR CHEST PORT 1 VW  5/9/2020 4:09 AM      HISTORY: cabg    COMPARISON: Yesterday    FINDINGS:   AP chest semiupright. [place]-Davy  catheter tip projects over the right  main pulmonary artery. Endotracheal tube tip projects over the mid to  low thoracic trachea. Stable positioning of left basilar chest tube  and mediastinal drains. Intact sternotomy wires and stable left atrial  appendage occlusion device. Cardiac silhouette remains enlarged. Small  left pleural effusion with adjacent compressive atelectasis and left  retrocardiac opacities. No pneumothorax.        Impression    IMPRESSION:   1. Stable positioning of support devices as above.  2. Small left pleural effusion and adjacent compressive atelectasis.  Additional left retrocardiac opacities may represent superimposed  consolidation.    I have personally reviewed the examination and initial interpretation  and I agree with the findings.    SEAN EDGE MD

## 2020-05-09 NOTE — PROGRESS NOTES
[facility], [place]  Procedure Note          Extubation:       Rudi Schilling  MRN# [id number]   May 9, 2020, 4:55 AM         Patient extubated at: May 9, 2020, 4:55 AM   Supplemental Oxygen: Via nasal cannula at 2 liters per minute   Cough: The cough is strong and good   Secretion Mode: PRN suction with assistance   Secretion Amount: Moderate amount, moderately thin and tan in color   Respiratory Exam:: Breath sounds: equal and clear and good aeration     Location: all lobes   Skin Exam:: Patient color: pink   Patient Status: Currently appears comfortable   Arterial Blood Gasses: pH Arterial (pH)   Date Value   05/09/2020 7.39     pO2 Arterial (mm Hg)   Date Value   05/09/2020 97     pCO2 Arterial (mm Hg)   Date Value   05/09/2020 48 (H)     Bicarbonate Arterial (mmol/L)   Date Value   05/09/2020 29 (H)            Recorded by Abdiel Doherty RT

## 2020-05-09 NOTE — PLAN OF CARE
Major Shift Events:  Extubated this AM at 04:55. Tolerating well. Slightly confused to time and place, but cooperative. Now on 8L oxymask, IS encouraged. Passed bedside swallow eval.  SR-ST (70-110s). MAPs >65. 500 Albumin given overnight for CVP of 9. Calvin titrated off this AM, while Epi was increased to help support heart, as CVP was 26 (from 9). UO excellent overnight. Blood sugars controlled with insulin gtt.  Minimal CT output, see intake / output.     Plan: Continue to monitor reporting changes to treatment team.   For vital signs and complete assessments, please see documentation flowsheets.

## 2020-05-09 NOTE — PROGRESS NOTES
05/09/20 1400   Quick Adds   Type of Visit Initial Occupational Therapy Evaluation   Living Environment   Lives With alone   Living Arrangements house   Home Accessibility stairs to enter home   Number of Stairs, Main Entrance 1   Transportation Anticipated car, drives self;family or friend will provide   Living Environment Comment Pt lives alone in a house, therea is one stair to enter then all needs are met on one level.    Self-Care   Usual Activity Tolerance good   Current Activity Tolerance fair   Equipment Currently Used at Home none   Activity/Exercise/Self-Care Comment Pt does not report using any AE at baseline.    Functional Level   Ambulation 0-->independent   Transferring 0-->independent   Toileting 0-->independent   Bathing 0-->independent   Dressing 0-->independent   Eating 0-->independent   Communication 0-->understands/communicates without difficulty   Swallowing 0-->swallows foods/liquids without difficulty   Cognition 0 - no cognition issues reported   Fall history within last six months no   Which of the above functional risks had a recent onset or change? ambulation;transferring;toileting;bathing;dressing;cognition   Prior Functional Level Comment Pt is independent with all functional mobility and ADLs at baseline.   General Information   Onset of Illness/Injury or Date of Surgery - Date 05/08/20   Referring Physician Aiyana Nolasco MD    Patient/Family Goals Statement return home   Additional Occupational Profile Info/Pertinent History of Current Problem Per chart: Rudi Schilling is a 70 year old male with PMH morbid obesity (BMI 40) and recently diagnosed atrial fibrillation and multivessel CAD with biventricular HFrEF (EF 10%). Transferred from OSH on 5/5/20 for management and revascularization. Underwent CABG with Dr. Holloway on 5/8. Admitted to the CVICU for hemodynamic monitoring and ventilatory support. Extubated on 5/9, doing well. Weaning pressors. Likely to transfer to  floor soon.    Precautions/Limitations fall precautions;sternal precautions   Weight-Bearing Status - LUE other (see comments)  (10#)   Weight-Bearing Status - RUE other (see comments)  (10#)   Weight-Bearing Status - LLE full weight-bearing   Weight-Bearing Status - RLE full weight-bearing   General Info Comments Activity: up to chair    Cognitive Status Examination   Orientation person;place   Level of Consciousness confused   Follows Commands (Cognition) WNL   Memory impaired   Attention Distractible during evaluation   Cognitive Comment Pt demos come cognition deficits with limited safety awareness, would benefit from further cognitive testing.    Visual Perception   Visual Perception No deficits were identified   Sensory Examination   Sensory Quick Adds No deficits were identified   Pain Assessment   Patient Currently in Pain Yes, see Vital Sign flowsheet   Posture   Posture forward head position   Range of Motion (ROM)   ROM Comment BUEs WFL    Strength   Strength Comments BUEs WFL    Hand Strength   Hand Strength Comments Bilateral  strength WFL    Coordination   Upper Extremity Coordination No deficits were identified   Gross Motor Coordination Impaired 2/2 pain and weakness   Fine Motor Coordination Not tested   Mobility   Bed Mobility Comments modA x2    Transfer Skill: Sit to Stand   Level of [place]: Sit/Stand minimum assist (75% patients effort)  (x2 people)   Balance   Balance Comments Pt is unsteady on feet and high fall risk.    Instrumental Activities of Daily Living (IADL)   Previous Responsibilities meal prep;housekeeping;laundry;shopping;medication management;finances;driving   IADL Comments Pt is retired, independent with all IADls at baseline.    Activities of Daily Living Analysis   Impairments Contributing to Impaired Activities of Daily Living balance impaired;cognition impaired;coordination impaired;pain;post surgical precautions;strength decreased   General Therapy Interventions  "  Planned Therapy Interventions ADL retraining;IADL retraining;cognition   Clinical Impression   Criteria for Skilled Therapeutic Interventions Met yes, treatment indicated   OT Diagnosis Decreased ADL-I   Influenced by the following impairments general deconditioning, fatigue, pain, cognition deficits, balance and coordation deficits, and post-surgical precautions   Assessment of Occupational Performance 5 or more Performance Deficits   Identified Performance Deficits ambulation, transferring, toileting, bathing, and dressing   Clinical Decision Making (Complexity) Low complexity   Therapy Frequency 6x/week   Predicted Duration of Therapy Intervention (days/wks) 5/23/2020   Anticipated Discharge Disposition Transitional Care Facility   Risks and Benefits of Treatment have been explained. Yes   Patient, Family & other staff in agreement with plan of care Yes   Clinical Impression Comments Pt presents to OT today with general deconditioning, fatigue, pain, cognition deficits, balance and coordation deficits, and post-surgical precautions, all leading to decreased ADL-I. Pt to benefit from skilled OT services to address the following problem list.    [facility] AM-PAC  \"6 Clicks\" Daily Activity Inpatient Short Form   1. Putting on and taking off regular lower body clothing? 2 - A Lot   2. Bathing (including washing, rinsing, drying)? 2 - A Lot   3. Toileting, which includes using toilet, bedpan or urinal? 2 - A Lot   4. Putting on and taking off regular upper body clothing? 2 - A Lot   5. Taking care of personal grooming such as brushing teeth? 3 - A Little   6. Eating meals? 4 - None   Daily Activity Raw Score (Score out of 24.Lower scores equate to lower levels of function) 15   Total Evaluation Time   Total Evaluation Time (Minutes) 5     "

## 2020-05-09 NOTE — PLAN OF CARE
Discharge Planner OT   Patient plan for discharge: Pt is agreeable to rehab.   Current status: Eval completed and tx initiated. Pt performed bed mobility using log roll technique with modA x2. Pt completed sit<>stand transfer from EOB with Kannan x2, attempted pivot transfer but pt was very unsteady with trying to take steps over to chair, returned pt back to sitting EOB. Pt completed 2 more sit<>stand transfers with minAx2, placed sling underneath pt to safely transfer to chair. Pt was dependently lifted with Ax3 for multiple line and furniture management, safely transferred to recliner. VSS on 8L O2 via oxymask.  Barriers to return to prior living situation: deconditioning, fatigue, pain, post-surgical precautions, balance and coordination deficits, cognition deficits   Recommendations for discharge: TCU   Rationale for recommendations: Pt would benefit from further therapy to progress functional independence with ADLs and mobility prior to return home.        Entered by: Luzma Abreu 05/09/2020 2:28 PM

## 2020-05-09 NOTE — PROGRESS NOTES
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 Transferred from OSH on 5/5/20 for management and revascularization. Underwent CABG with Dr. Hololway on 5/8. Admitted to the CVICU for hemodynamic monitoring and ventilatory support. Extubated on 5/9, doing well. Weaning pressors. Likely to transfer to floor soon.       Major changes today:   - elevated body temperature 5/9. Pancultured. No need for antibiotics for now. Trending white count and body temp.   - Optimizing pain control and pulmonary hygiene   - Goal to wean pressors today   - Once off pressors; plan to resume afterload reductions meds. Starting first with losartan 50 mg (cards recs)   - Might be transferred to floor, doing very well.     PLAN:    Neurological:  # Post operative pain  - Monitor neurological status. Delirium preventions and precautions.   - Pain:  Acetaminophen Q 8 hrs. Prn dilaudid and oxycodone      Pulmonary:   # Extubated 5/9  - Supplemental oxygen to keep saturation above 92 %.  - Incentive spirometer every 15- 30 minutes when awake.    Cardiovascular:    # Severe biventricular HFrEF   # Ischemic cardiomyopathy with multivessel CAD s/p CABG   # Atrial fibrillation   - Monitor hemodynamic status.   - wean pressors as able, on epinephrine   - Holding Losartan 100 mg daily as he is on pressors. Resuming low dose once epi off.   - Daily lasix 40 mg PO   - Fluid restriction <2.0 L daily.   - Afib: Digoxin 125 mcg daily for afib, carvedilol 6.25 mg BID. Holding carvedilol still on pressors.   - Atorvastatin, ASA       Gastroenterology/Nutrition:  # Post operative nutrition   # h/o GERD   - ADAT   - Protonix 40 mg daily for GERD   - No indication for parenteral nutrition.    Fluids/Electrolytes:   # No acute issues       Renal:  # No acute issues   - Urine output is  adequate . Will continue to monitor intake and output.      Endocrine:  # Perioperative hyperglycemia  - sliding scale insulin for glucose management. Goal to keep BG< 180 for optimal wound healing     ID:  # Positive Blood culture w/ staph hominis, suspected contaminations  # Perioperative antibiotics   - Spiked fever on 4/29; teams closely following white count, body tem. So far doing well off antibiotics.   - elevated body temperature 5/9, pancultured. Holding antibiotics for now.     Heme:     # Post operative anemia  - Transfuse if hgb <7.0 or signs/symptoms of hypoperfusion. Monitor and trend.     Musculoskeletal:  # H/o chronic back pain  - Holding PTA cyclobenzaprine PRN    Skin:  # No acute issues     General Cares/Prophylaxis:    DVT Prophylaxis: Pneumatic Compression Devices, heparin subcutaneous   GI Prophylaxis: PPI  Restraints: Restraints for medical healing needed: NO    Lines/ tubes/ drains:  - Right internal jugular MAC with PA catheter, arterial line, molina catheter, PIV x2, Chest tube/drains x 4,     Disposition:  - CV ICU.     Patient seen, findings and plan discussed with CV ICU staff, Dr. Chapis Andres.    Time Spent on this Encounter   Billing:  I spent 30 minutes bedside and on the inpatient unit today managing the critical care of Rudi Schilling in relation to the issues listed in this note.      Fer Banuelos  - - - - - - - - - - - - - - - - - - - - - - - - - - - - - - - - - - - - - - - - - - - - - -   SUBJECTIVE: Given albumin overnight for borderline BP. This morning with higher CVP. No acute events.     PHYSICAL EXAMINATION:  Temp:  [98.6  F (37  C)-101.1  F (38.4  C)] 99.9  F (37.7  C)  Heart Rate:  [] 108  Resp:  [10-25] 20  BP: (116)/(59) 116/59  MAP:  [57 mmHg-91 mmHg] 71 mmHg  Arterial Line BP: ()/(46-70) 104/58  FiO2 (%):  [40 %-50 %] 40 %  SpO2:  [90 %-100 %] 97 %  General: morbidly obese, in bed, smiling.   HEENT: no bleeding observed, oral cavity  hydrated   Neck:thick neck, with right internal jugular line in place.   Neuro: Alert, oriented x 3, moving his 4 extremities.   Pulm/Resp: Clear breath sounds bilaterally without rhonchi, crackles or wheeze,  breathing non-labored  CV: audible heart sounds, rhythmic. Chest tubes coming from his lower chest.   Abdomen: Soft, non-distended, non-tender, no rebound tenderness or guarding, no masses  : molina catheter in place, urine yellow and clear  Incisions/Skin: incisions covered with clear dry dressings.   MSK/Extremities: mild peripheral edema,  extremities well perfused. Left lower extremity with dressings over lower extremity venous graft.     LABS: Reviewed.   Arterial Blood Gases   Recent Labs   Lab 05/09/20  0822 05/09/20  0412 05/08/20  1528 05/08/20  1415   PH 7.40 7.39 7.38 7.40   PCO2 43 48* 45 43   PO2 59* 97 82 188*   HCO3 27 29* 27 27     Complete Blood Count   Recent Labs   Lab 05/09/20 0413 05/08/20  1528 05/08/20  1415 05/08/20  1341 05/08/20  1338  05/08/20  0544 05/07/20  0612   WBC 14.0* 19.5*  --   --   --   --  9.5 8.7   HGB 11.4* 12.7* 12.1*  --  10.9*   < > 13.1* 12.6*    219  --  169  --   --  264 289    < > = values in this interval not displayed.     Basic Metabolic Panel  Recent Labs   Lab 05/09/20 0413 05/08/20  2110 05/08/20  1528 05/08/20  1415 05/08/20  1338  05/08/20  0544 05/07/20  0612     --  140 138 139   < > 137 138   POTASSIUM 4.7 4.7 4.6 5.0 5.3   < > 4.6 4.4   CHLORIDE 108  --  106  --   --   --  102 102   CO2 27  --  27  --   --   --  30 29   BUN 20  --  20  --   --   --  16 16   CR 0.96  --  1.04  --   --   --  0.94 0.86   *  --  190* 207* 197*   < > 122* 114*    < > = values in this interval not displayed.     Liver Function Tests  Recent Labs   Lab 05/09/20  0413 05/08/20  1528 05/08/20  1341 05/08/20  0544 05/07/20  1315 05/07/20  0612   AST 36 42  --  27  --  19   ALT 21 27  --  32  --  28   ALKPHOS 34* 40  --  46  --  42   BILITOTAL 0.6 1.0  --   0.7  --  0.6   ALBUMIN 3.3* 2.7*  --  3.1*  --  3.1*   INR  --  1.41* 1.85*  --  1.18* 1.20*     Pancreatic Enzymes  No lab results found in last 7 days.  Coagulation Profile  Recent Labs   Lab 05/08/20  1528 05/08/20  1341 05/07/20  1315 05/07/20  0612   INR 1.41* 1.85* 1.18* 1.20*   PTT 32 34 70*  --        IMAGING:  Recent Results (from the past 24 hour(s))   XR Chest Port 1 View    Narrative    Portable chest    INDICATION: CAB    COMPARISON: 5/7/2020    FINDINGS: Numerous tubes and supporting devices are again present,  including an endotracheal tube with its tip approximately 3.3 cm above  the gayla. Heart size remains mildly enlarged. Bibasilar atelectasis  is present, cannot exclude tiny pleural effusions. Median sternotomy  again noted. Right IJ Florence-Davy catheter is again present with tip in  the right main branch pulmonary artery. An NG/OG tube progresses  beyond the inferior margin of the image. The intra-aortic balloon pump  marker is not as well identified. Intrauterine changes of CABG  procedure and left atrial appendage ligation.      Impression    IMPRESSION: Interim median sternotomy, CABG and left atrial appendage  clipping with nonvisualization of previous intra-aortic balloon pump.  Right IJ Florence-Davy catheter with tip in right pulmonary artery.  Cardiomegaly. Bibasilar atelectasis, cannot exclude small pleural  effusions.    LEIGH FLOWERS MD   XR Abdomen Port 1 View    Narrative    Exam: XR ABDOMEN PORT 1 VW, 5/8/2020 4:43 PM    Indication: OG placement    Comparison: 5/8/2020 radiograph of the chest and abdomen    Findings:   Nonspecific prominent loops of air-filled bowel in the right abdomen.  There are multiple mediastinal drains at the superior edge of the  radiograph, notably there is one tracking along the left vertebral  border. Orogastric tube passes across the lower thoracic vertebral  bodies with tip presumably in the stomach body.      Impression    Impression:   1.  Orogastric tube tip appears to be in the stomach body.  2. Nonspecific prominent loops of bowel in the right abdomen.    I have personally reviewed the examination and initial interpretation  and I agree with the findings.    YAAKOV JAIMES MD   XR Abdomen Port 1 View    Narrative    Exam: XR ABDOMEN PORT 1 VW, 5/8/2020 4:43 PM    Indication: OG placement    Comparison: 5/8/2020 radiograph of the chest and abdomen    Findings:   Nonspecific prominent loops of air-filled bowel in the right abdomen.  Incompletely visualized [place]-Davy catheter. There are multiple  mediastinal drains at the superior edge of the radiograph, notably  there is one tracking along the left vertebral border. There is also  left basilar chest tube. Orogastric tube passes across the lower  thoracic vertebral bodies with tip presumably in the distal stomach.      Impression    Impression:   1. Orogastric tube tip appears to be in the distal stomach.  2. Nonspecific prominent loops of bowel in the right abdomen.    I have personally reviewed the examination and initial interpretation  and I agree with the findings.    YAAKOV JAIMES MD   XR Abdomen Port 1 View    Narrative    XR ABDOMEN PORT 1 VW  5/8/2020 9:25 PM      HISTORY: OG - placement    COMPARISON: 5/8/2020    FINDINGS: Supine views of the abdomen. OG tube tip and sidehole  project over the expected location of the stomach body. Partially  visualized chest tubes, mediastinal drains, and [place]-Davy catheter.  Nonobstructive bowel gas pattern. No free air, pneumatosis, or portal  venous gas.      Impression    IMPRESSION: OG tube tip and sidehole project over the expected  location of stomach body.    I have personally reviewed the examination and initial interpretation  and I agree with the findings.    YAAKOV JAIMES MD   XR Chest Port 1 View    Narrative    XR CHEST PORT 1 VW  5/9/2020 4:09 AM      HISTORY: cabg    COMPARISON: Yesterday    FINDINGS:   AP chest semiupright. [place]-Davy  catheter tip projects over the right  main pulmonary artery. Endotracheal tube tip projects over the mid to  low thoracic trachea. Stable positioning of left basilar chest tube  and mediastinal drains. Intact sternotomy wires and stable left atrial  appendage occlusion device. Cardiac silhouette remains enlarged. Small  left pleural effusion with adjacent compressive atelectasis and left  retrocardiac opacities. No pneumothorax.        Impression    IMPRESSION:   1. Stable positioning of support devices as above.  2. Small left pleural effusion and adjacent compressive atelectasis.  Additional left retrocardiac opacities may represent superimposed  consolidation.    I have personally reviewed the examination and initial interpretation  and I agree with the findings.    SEAN EDGE MD

## 2020-05-09 NOTE — PLAN OF CARE
PT 4E / Discharge Planner PT   Patient plan for discharge: not discussed today  Current status: PT evaluation completed. Completes sit<>stand from recliner with min/mod Ax2 up to FWW, MAP down to 45 with standing, symptomatic for lightheadedness. Improves to MAP>65 within 2-3 minutes seated rest. Improved tolerance with second stand (MAP down to 55, improves within 1 minute seated rest), pivots chair>bed with FWW + min Ax2. HR 90-100s, SpO2>93% on 4L NC.  Barriers to return to prior living situation: medical needs, post-op precautions, current level of function  Recommendations for discharge: rehab  Rationale for recommendations: Patient will require ongoing therapy to progress strength, activity tolerance and functional independence.       Entered by: Gabriel Solorio 05/09/2020 2:47 PM

## 2020-05-10 ENCOUNTER — APPOINTMENT (OUTPATIENT)
Dept: GENERAL RADIOLOGY | Facility: CLINIC | Age: 70
DRG: 234 | End: 2020-05-10
Attending: PHYSICIAN ASSISTANT
Payer: MEDICARE

## 2020-05-10 LAB
ANION GAP SERPL CALCULATED.3IONS-SCNC: 5 MMOL/L (ref 3–14)
BLD PROD TYP BPU: NORMAL
BLD PROD TYP BPU: NORMAL
BLD UNIT ID BPU: 0
BLD UNIT ID BPU: 0
BLOOD PRODUCT CODE: NORMAL
BLOOD PRODUCT CODE: NORMAL
BPU ID: NORMAL
BPU ID: NORMAL
BUN SERPL-MCNC: 24 MG/DL (ref 7–30)
CALCIUM SERPL-MCNC: 8.9 MG/DL (ref 8.5–10.1)
CHLORIDE SERPL-SCNC: 102 MMOL/L (ref 94–109)
CO2 SERPL-SCNC: 28 MMOL/L (ref 20–32)
CREAT SERPL-MCNC: 0.9 MG/DL (ref 0.66–1.25)
ERYTHROCYTE [DISTWIDTH] IN BLOOD BY AUTOMATED COUNT: 14.5 % (ref 10–15)
ERYTHROCYTE [DISTWIDTH] IN BLOOD BY AUTOMATED COUNT: 14.5 % (ref 10–15)
GFR SERPL CREATININE-BSD FRML MDRD: 87 ML/MIN/{1.73_M2}
GLUCOSE BLDC GLUCOMTR-MCNC: 126 MG/DL (ref 70–99)
GLUCOSE BLDC GLUCOMTR-MCNC: 127 MG/DL (ref 70–99)
GLUCOSE BLDC GLUCOMTR-MCNC: 178 MG/DL (ref 70–99)
GLUCOSE SERPL-MCNC: 161 MG/DL (ref 70–99)
HCT VFR BLD AUTO: 34 % (ref 40–53)
HCT VFR BLD AUTO: 35.6 % (ref 40–53)
HGB BLD-MCNC: 10.1 G/DL (ref 13.3–17.7)
HGB BLD-MCNC: 10.5 G/DL (ref 13.3–17.7)
LACTATE BLD-SCNC: 1.3 MMOL/L (ref 0.7–2)
LMWH PPP CHRO-ACNC: <0.1 IU/ML
MCH RBC QN AUTO: 28.5 PG (ref 26.5–33)
MCH RBC QN AUTO: 28.5 PG (ref 26.5–33)
MCHC RBC AUTO-ENTMCNC: 29.5 G/DL (ref 31.5–36.5)
MCHC RBC AUTO-ENTMCNC: 29.7 G/DL (ref 31.5–36.5)
MCV RBC AUTO: 96 FL (ref 78–100)
MCV RBC AUTO: 97 FL (ref 78–100)
PLATELET # BLD AUTO: 175 10E9/L (ref 150–450)
PLATELET # BLD AUTO: 176 10E9/L (ref 150–450)
POTASSIUM SERPL-SCNC: 4.4 MMOL/L (ref 3.4–5.3)
RBC # BLD AUTO: 3.55 10E12/L (ref 4.4–5.9)
RBC # BLD AUTO: 3.69 10E12/L (ref 4.4–5.9)
SODIUM SERPL-SCNC: 135 MMOL/L (ref 133–144)
TRANSFUSION STATUS PATIENT QL: NORMAL
WBC # BLD AUTO: 14 10E9/L (ref 4–11)
WBC # BLD AUTO: 14.5 10E9/L (ref 4–11)

## 2020-05-10 PROCEDURE — 25000128 H RX IP 250 OP 636: Performed by: SURGERY

## 2020-05-10 PROCEDURE — 25000132 ZZH RX MED GY IP 250 OP 250 PS 637: Mod: GY | Performed by: PHYSICIAN ASSISTANT

## 2020-05-10 PROCEDURE — 85520 HEPARIN ASSAY: CPT | Performed by: THORACIC SURGERY (CARDIOTHORACIC VASCULAR SURGERY)

## 2020-05-10 PROCEDURE — 40000986 XR CHEST PORT 1 VW

## 2020-05-10 PROCEDURE — 25000132 ZZH RX MED GY IP 250 OP 250 PS 637: Mod: GY | Performed by: SURGERY

## 2020-05-10 PROCEDURE — 00000146 ZZHCL STATISTIC GLUCOSE BY METER IP

## 2020-05-10 PROCEDURE — 71045 X-RAY EXAM CHEST 1 VIEW: CPT

## 2020-05-10 PROCEDURE — 25000125 ZZHC RX 250: Performed by: SURGERY

## 2020-05-10 PROCEDURE — 36415 COLL VENOUS BLD VENIPUNCTURE: CPT | Performed by: PHYSICIAN ASSISTANT

## 2020-05-10 PROCEDURE — 25000132 ZZH RX MED GY IP 250 OP 250 PS 637: Mod: GY | Performed by: STUDENT IN AN ORGANIZED HEALTH CARE EDUCATION/TRAINING PROGRAM

## 2020-05-10 PROCEDURE — 21400000 ZZH R&B CCU UMMC

## 2020-05-10 PROCEDURE — 25800030 ZZH RX IP 258 OP 636: Performed by: PHYSICIAN ASSISTANT

## 2020-05-10 PROCEDURE — 36415 COLL VENOUS BLD VENIPUNCTURE: CPT | Performed by: THORACIC SURGERY (CARDIOTHORACIC VASCULAR SURGERY)

## 2020-05-10 PROCEDURE — 36569 INSJ PICC 5 YR+ W/O IMAGING: CPT

## 2020-05-10 PROCEDURE — 25000132 ZZH RX MED GY IP 250 OP 250 PS 637: Mod: GY

## 2020-05-10 PROCEDURE — 80048 BASIC METABOLIC PNL TOTAL CA: CPT | Performed by: PHYSICIAN ASSISTANT

## 2020-05-10 PROCEDURE — 83605 ASSAY OF LACTIC ACID: CPT | Performed by: THORACIC SURGERY (CARDIOTHORACIC VASCULAR SURGERY)

## 2020-05-10 PROCEDURE — 85027 COMPLETE CBC AUTOMATED: CPT | Performed by: PHYSICIAN ASSISTANT

## 2020-05-10 PROCEDURE — 25000128 H RX IP 250 OP 636: Performed by: PHYSICIAN ASSISTANT

## 2020-05-10 PROCEDURE — 27211417 ZZ H KIT, VPS RHYTHM STYLET

## 2020-05-10 PROCEDURE — C1751 CATH, INF, PER/CENT/MIDLINE: HCPCS

## 2020-05-10 RX ORDER — LOSARTAN POTASSIUM 25 MG/1
25 TABLET ORAL DAILY
Status: DISCONTINUED | OUTPATIENT
Start: 2020-05-10 | End: 2020-05-10

## 2020-05-10 RX ORDER — HEPARIN SODIUM 10000 [USP'U]/100ML
0-3500 INJECTION, SOLUTION INTRAVENOUS CONTINUOUS
Status: DISCONTINUED | OUTPATIENT
Start: 2020-05-10 | End: 2020-05-19

## 2020-05-10 RX ORDER — LIDOCAINE 40 MG/G
CREAM TOPICAL
Status: DISCONTINUED | OUTPATIENT
Start: 2020-05-10 | End: 2020-05-19 | Stop reason: HOSPADM

## 2020-05-10 RX ORDER — NICOTINE POLACRILEX 4 MG
15-30 LOZENGE BUCCAL
Status: DISCONTINUED | OUTPATIENT
Start: 2020-05-10 | End: 2020-05-10

## 2020-05-10 RX ORDER — DEXTROSE MONOHYDRATE 25 G/50ML
25-50 INJECTION, SOLUTION INTRAVENOUS
Status: DISCONTINUED | OUTPATIENT
Start: 2020-05-10 | End: 2020-05-10

## 2020-05-10 RX ORDER — LIDOCAINE 40 MG/G
CREAM TOPICAL
Status: DISCONTINUED | OUTPATIENT
Start: 2020-05-10 | End: 2020-05-10

## 2020-05-10 RX ORDER — MAGNESIUM SULFATE HEPTAHYDRATE 40 MG/ML
2 INJECTION, SOLUTION INTRAVENOUS ONCE
Status: COMPLETED | OUTPATIENT
Start: 2020-05-10 | End: 2020-05-10

## 2020-05-10 RX ORDER — HEPARIN SODIUM,PORCINE 10 UNIT/ML
2-5 VIAL (ML) INTRAVENOUS
Status: COMPLETED | OUTPATIENT
Start: 2020-05-10 | End: 2020-05-12

## 2020-05-10 RX ORDER — MINERAL OIL 100 G/100G
1 OIL RECTAL ONCE
Status: COMPLETED | OUTPATIENT
Start: 2020-05-10 | End: 2020-05-10

## 2020-05-10 RX ADMIN — LOSARTAN POTASSIUM 25 MG: 25 TABLET, FILM COATED ORAL at 10:58

## 2020-05-10 RX ADMIN — DIGOXIN 125 MCG: 0.06 TABLET ORAL at 08:10

## 2020-05-10 RX ADMIN — OXYCODONE HYDROCHLORIDE 10 MG: 5 TABLET ORAL at 10:19

## 2020-05-10 RX ADMIN — AMIODARONE HYDROCHLORIDE 1 MG/MIN: 50 INJECTION, SOLUTION INTRAVENOUS at 16:48

## 2020-05-10 RX ADMIN — MUPIROCIN 0.5 G: 20 OINTMENT TOPICAL at 08:11

## 2020-05-10 RX ADMIN — MINERAL OIL 1 ENEMA: 118 ENEMA RECTAL at 19:22

## 2020-05-10 RX ADMIN — GABAPENTIN 100 MG: 100 CAPSULE ORAL at 08:10

## 2020-05-10 RX ADMIN — MAGNESIUM SULFATE 2 G: 2 INJECTION INTRAVENOUS at 11:19

## 2020-05-10 RX ADMIN — ACETAMINOPHEN 975 MG: 325 TABLET, FILM COATED ORAL at 15:00

## 2020-05-10 RX ADMIN — AMIODARONE HYDROCHLORIDE 150 MG: 1.5 INJECTION, SOLUTION INTRAVENOUS at 10:51

## 2020-05-10 RX ADMIN — LIDOCAINE HYDROCHLORIDE 1 ML: 10 INJECTION, SOLUTION EPIDURAL; INFILTRATION; INTRACAUDAL; PERINEURAL at 17:52

## 2020-05-10 RX ADMIN — HYDROMORPHONE HYDROCHLORIDE 0.5 MG: 1 INJECTION, SOLUTION INTRAMUSCULAR; INTRAVENOUS; SUBCUTANEOUS at 14:56

## 2020-05-10 RX ADMIN — SENNOSIDES AND DOCUSATE SODIUM 2 TABLET: 8.6; 5 TABLET ORAL at 08:11

## 2020-05-10 RX ADMIN — ASPIRIN 325 MG ORAL TABLET 325 MG: 325 PILL ORAL at 08:10

## 2020-05-10 RX ADMIN — GABAPENTIN 100 MG: 100 CAPSULE ORAL at 20:36

## 2020-05-10 RX ADMIN — GABAPENTIN 100 MG: 100 CAPSULE ORAL at 14:54

## 2020-05-10 RX ADMIN — ACETAMINOPHEN 975 MG: 325 TABLET, FILM COATED ORAL at 08:10

## 2020-05-10 RX ADMIN — PANTOPRAZOLE SODIUM 40 MG: 40 TABLET, DELAYED RELEASE ORAL at 08:11

## 2020-05-10 RX ADMIN — FUROSEMIDE 40 MG: 40 TABLET ORAL at 08:11

## 2020-05-10 RX ADMIN — HEPARIN SODIUM 1200 UNITS/HR: 10000 INJECTION, SOLUTION INTRAVENOUS at 20:37

## 2020-05-10 RX ADMIN — ATORVASTATIN CALCIUM 40 MG: 40 TABLET, FILM COATED ORAL at 20:37

## 2020-05-10 RX ADMIN — HEPARIN SODIUM 5000 UNITS: 5000 INJECTION, SOLUTION INTRAVENOUS; SUBCUTANEOUS at 04:28

## 2020-05-10 ASSESSMENT — ACTIVITIES OF DAILY LIVING (ADL)
ADLS_ACUITY_SCORE: 14

## 2020-05-10 ASSESSMENT — MIFFLIN-ST. JEOR: SCORE: 2054.49

## 2020-05-10 ASSESSMENT — PAIN DESCRIPTION - DESCRIPTORS: DESCRIPTORS: DULL

## 2020-05-10 NOTE — PLAN OF CARE
D: Pt admit 5/5/20 from OSH for management of recently diagnosed afib and multivessel CAD w/ biventricular HFrEF (EF 10%) now s/p CABG (5/8). PMH morbid obesity BMI 40    I/A:   Neuro: A&Ox4. Makes needs known.   VS: Pt temp elevated 100.3, scheduled tylenol administered. 1.5-3L NC. Frequent cough, productive w/ white thick sputum. IS encouraged/teaching reinforced.   Tele: ST. Pacer wires capped.   Pain: denies   GI/: low urine output overnight. Pt had smear BM.   Diet: 2g Na  IV/Drips: R PIV SL  Activity: Assist x2 with gait belt and walker  Skin/drains: R groin incision CDI. CT x3 to one atrium, suction -20. See flowsheets for shift output. Dressing intact. L knee incision open to air, no drainage noted. Midline incision covered, dressing CDI.     P: Plan to Continue to monitor pt status and report changes to CVTS.     Ines Tinoco RN

## 2020-05-10 NOTE — PROGRESS NOTES
5/10/2020   CVTS Progress Note  Attending provider: Yobany Holloway, *        S:  No acute issues over night. Patient denies  SOB, CP, fever, chills, sweats. Patient started on diet. Standing with assistance. Small BM this AM. A-fib arrhythmias.     O:   Vitals:    05/10/20 0613 05/10/20 0752 05/10/20 1100 05/10/20 1221   BP:  117/79 98/64 95/85   BP Location:  Right arm  Left arm   Pulse:  86 123 118   Resp:  18 22 22   Temp:  99.8  F (37.7  C) 98.5  F (36.9  C) 98.5  F (36.9  C)   TempSrc:  Oral Oral Oral   SpO2:  98% 96% 98%   Weight: 127.8 kg (281 lb 12.8 oz)      Height:         Vitals:    05/08/20 0600 05/09/20 0500 05/10/20 0613   Weight: 126.6 kg (279 lb 1.6 oz) 128.8 kg (283 lb 15.2 oz) 127.8 kg (281 lb 12.8 oz)       Intake/Output Summary (Last 24 hours) at 5/10/2020 1441  Last data filed at 5/10/2020 1221  Gross per 24 hour   Intake 1640 ml   Output 985 ml   Net 655 ml       Gen: AxOx3, NAD  CV: irregular irregular, no murmurs, rubs, or gallops, -130's   Pulm: +CTA, no wheezing, no rhonchi  Abd: soft, NT, ND, +BS, +BM  Ext: no LE edema  Incision: c/d/i, no erythema, sternal stable  Tubes/drains: 190/40 ml appear clotted off. Draining around CT's.     Labs:   BMP  Recent Labs   Lab 05/10/20  1023 05/09/20  0413 05/08/20  2110 05/08/20  1528 05/08/20  1415  05/08/20  0544    141  --  140 138   < > 137   POTASSIUM 4.4 4.7 4.7 4.6 5.0   < > 4.6   CHLORIDE 102 108  --  106  --   --  102   SIDRA 8.9 8.6  --  8.6  --   --  9.2   CO2 28 27  --  27  --   --  30   BUN 24 20  --  20  --   --  16   CR 0.90 0.96  --  1.04  --   --  0.94   * 147*  --  190* 207*   < > 122*    < > = values in this interval not displayed.     CBC  Recent Labs   Lab 05/10/20  1023 05/09/20  0413 05/08/20  1528 05/08/20  1415 05/08/20  1341  05/08/20  0544   WBC 14.0* 14.0* 19.5*  --   --   --  9.5   RBC 3.69* 3.99* 4.41  --   --   --  4.57   HGB 10.5* 11.4* 12.7* 12.1*  --    < > 13.1*   HCT 35.6* 37.7* 40.8  --    --   --  43.1   MCV 97 95 93  --   --   --  94   MCH 28.5 28.6 28.8  --   --   --  28.7   MCHC 29.5* 30.2* 31.1*  --   --   --  30.4*   RDW 14.5 14.2 14.1  --   --   --  14.1    222 219  --  169  --  264    < > = values in this interval not displayed.     INR  Recent Labs   Lab 05/08/20  1528 05/08/20  1341 05/07/20  1315 05/07/20  0612   INR 1.41* 1.85* 1.18* 1.20*      Hepatic Panel   Lab Results   Component Value Date    AST 36 05/09/2020     Lab Results   Component Value Date    ALT 21 05/09/2020     No results found for: BILICONJ   Lab Results   Component Value Date    BILITOTAL 0.6 05/09/2020     Lab Results   Component Value Date    ALBUMIN 3.3 05/09/2020     Lab Results   Component Value Date    PROTTOTAL 6.3 05/09/2020      Lab Results   Component Value Date    ALKPHOS 34 05/09/2020       Recent Labs   Lab 05/10/20  1105 05/10/20  1023 05/09/20  1417 05/09/20  0821 05/09/20  0612 05/09/20  0413 05/09/20  0412 05/09/20  0208  05/08/20  1528 05/08/20  1415 05/08/20  1338 05/08/20  1321   GLC  --  161*  --   --   --  147*  --   --   --  190* 207* 197* 187*   *  --  80 147* 146*  --  118* 111*   < >  --   --   --   --     < > = values in this interval not displayed.         Imaging: reviewed     ASSESSMENT: Rudi Schilling is a 70 year old male with PMH morbid obesity (BMI 40) and recently diagnosed atrial fibrillation and multivessel CAD with biventricular HFrEF (EF 10%). Transferred from OSH on 5/5/20 for management and revascularization. Underwent CABG with Dr. Holloway on 5/8. Admitted to the CVICU for hemodynamic monitoring and ventilatory support. Extubated on 5/9, doing well. Weaning pressors. Likely to transfer to floor soon.           PLAN:     Neurological:  # Post operative pain  - Monitor neurological status. Delirium preventions and precautions.   - Pain:   Acetaminophen Q 8 hrs. Prn dilaudid and oxycodone        Pulmonary:   # Extubated 5/9  - Supplemental oxygen to keep saturation  above 92 %.  - Incentive spirometer every 15- 30 minutes when awake.     Cardiovascular:    # Severe biventricular HFrEF   # Ischemic cardiomyopathy with multivessel CAD s/p CABG   # Atrial fibrillation   - Monitor hemodynamic status.   - wean pressors as able, on epinephrine   - Holding Losartan 100 mg daily as he is on pressors. Resuming low dose once epi off.   - Daily lasix 40 mg PO   - Fluid restriction <2.0 L daily.   - Afib: Digoxin 125 mcg daily for afib, hold PTA carvedilol 6.25 mg BID due to RHF.   -  Started losartan 25 mg (cards recs) 5/10, pressure in afternoon a little soft and patient lightheaded. Will stop.   - Atorvastatin, ASA   - A-fib with RVR, gave amio 150 mg bolus, will start drip.         Gastroenterology/Nutrition:  # Post operative nutrition   # h/o GERD   - ADAT   - Protonix 40 mg daily for GERD   - No indication for parenteral nutrition.     Fluids/Electrolytes:   # No acute issues         Renal:  # No acute issues   - Urine output is adequate . Will continue to monitor intake and output.  - lasix 40 mg daily, reassess daily. Will not increase today given softer BP in PM.         Endocrine:  # Perioperative hyperglycemia  - sliding scale insulin for glucose management. Goal to keep BG< 180 for optimal wound healing      ID:  # Positive Blood culture w/ staph hominis, suspected contaminations  # Perioperative antibiotics   - Spiked fever on 4/29; teams closely following white count, body tem. So far doing well off antibiotics.   - Tmax 100.3 5/9, pancultured. Holding antibiotics for now.   - WBC 14, trending down.      Heme:     # Post operative anemia  - Transfuse if hgb <7.0 or signs/symptoms of hypoperfusion. Monitor and trend.   - Start LI heparin tonight.      Musculoskeletal:  # H/o chronic back pain  - Holding PTA cyclobenzaprine PRN     Skin:  # No acute issues      General Cares/Prophylaxis:    DVT Prophylaxis: Pneumatic Compression Devices, heparin subcutaneous   GI Prophylaxis:  PPI  Restraints: Restraints for medical healing needed: NO     Lines/ tubes/ drains:   PIV x2, Chest tube/drains x 4,      Disposition:  - 6C since 5/9  - removed wires and CTs  - LI heparin tonight for a-fib     Discussed with staff        Kayli Serna PA-C  Cardiothoracic Surgery   Pager [phone]  May 10, 2020

## 2020-05-10 NOTE — PROCEDURES
[facility], [place]    Double Lumen PICC Placement    Date/Time: 5/10/2020 6:30 PM  Performed by: Juli Smith RN  Authorized by: Kayli Webster PA-C   Indications: vascular access    UNIVERSAL PROTOCOL   Site Marked: Yes  Prior Images Obtained and Reviewed:  Yes  Required items: Required blood products, implants, devices and special equipment available    Patient identity confirmed:  Verbally with patient and arm band  NA - No sedation, light sedation, or local anesthesia  Confirmation Checklist:  Patient's identity using two indicators, relevant allergies, procedure was appropriate and matched the consent or emergent situation and correct equipment/implants were available  Time out: Immediately prior to the procedure a time out was called    Universal Protocol: the Joint Commission Universal Protocol was followed    Preparation: Patient was prepped and draped in usual sterile fashion           ANESTHESIA    Anesthesia: See MAR for details  Local Anesthetic:  Lidocaine 1% without epinephrine  Anesthetic Total (mL):  1      SEDATION    Patient Sedated: No        Preparation: skin prepped with ChloraPrep  Skin prep agent: skin prep agent completely dried prior to procedure  Sterile barriers: maximum sterile barriers were used: cap, mask, sterile gown, sterile gloves, and large sterile sheet  Hand hygiene: hand hygiene performed prior to central venous catheter insertion  Type of line used: PICC  Catheter type: double lumen  Lumen type: valved  Catheter size: 5 Fr  : Bioflo DL valved.  Lot number: [id number]  Placement method: venipuncture, MST, ultrasound and tip confirmation system  Number of attempts: 1  Successful placement: yes  Orientation: right  Location: basilic vein (Vein diameter 0.77)  Arm circumference: adults 10 cm  Extremity circumference: 36  Visible catheter length: 1  Internal length: 52 cm  Total catheter length: 53  Dressing and securement: chlorhexidine disc  applied, dressing applied, line secured, occlusive dressing applied, securement device, statlock and sterile dressing applied  Post procedure assessment: blood return through all ports and placement verified by x-ray  PROCEDURE   Patient Tolerance:  Patient tolerated the procedure well with no immediate complications     OK to use picc, tip at CAJ

## 2020-05-11 ENCOUNTER — APPOINTMENT (OUTPATIENT)
Dept: OCCUPATIONAL THERAPY | Facility: CLINIC | Age: 70
DRG: 234 | End: 2020-05-11
Attending: THORACIC SURGERY (CARDIOTHORACIC VASCULAR SURGERY)
Payer: MEDICARE

## 2020-05-11 LAB
ANION GAP SERPL CALCULATED.3IONS-SCNC: 5 MMOL/L (ref 3–14)
BACTERIA SPEC CULT: ABNORMAL
BACTERIA SPEC CULT: ABNORMAL
BACTERIA SPEC CULT: NO GROWTH
BACTERIA SPEC CULT: NO GROWTH
BUN SERPL-MCNC: 25 MG/DL (ref 7–30)
CALCIUM SERPL-MCNC: 8.8 MG/DL (ref 8.5–10.1)
CHLORIDE SERPL-SCNC: 101 MMOL/L (ref 94–109)
CO2 SERPL-SCNC: 30 MMOL/L (ref 20–32)
CREAT SERPL-MCNC: 0.82 MG/DL (ref 0.66–1.25)
ERYTHROCYTE [DISTWIDTH] IN BLOOD BY AUTOMATED COUNT: 14.6 % (ref 10–15)
GFR SERPL CREATININE-BSD FRML MDRD: >90 ML/MIN/{1.73_M2}
GLUCOSE BLDC GLUCOMTR-MCNC: 105 MG/DL (ref 70–99)
GLUCOSE BLDC GLUCOMTR-MCNC: 113 MG/DL (ref 70–99)
GLUCOSE BLDC GLUCOMTR-MCNC: 131 MG/DL (ref 70–99)
GLUCOSE BLDC GLUCOMTR-MCNC: 143 MG/DL (ref 70–99)
GLUCOSE SERPL-MCNC: 119 MG/DL (ref 70–99)
GRAM STN SPEC: ABNORMAL
HCT VFR BLD AUTO: 33 % (ref 40–53)
HGB BLD-MCNC: 9.8 G/DL (ref 13.3–17.7)
INR PPP: 1.17 (ref 0.86–1.14)
LACTATE BLD-SCNC: 0.9 MMOL/L (ref 0.7–2)
LMWH PPP CHRO-ACNC: 0.24 IU/ML
LMWH PPP CHRO-ACNC: <0.1 IU/ML
Lab: ABNORMAL
MCH RBC QN AUTO: 28.6 PG (ref 26.5–33)
MCHC RBC AUTO-ENTMCNC: 29.7 G/DL (ref 31.5–36.5)
MCV RBC AUTO: 96 FL (ref 78–100)
PETH BLD-MCNC: NEGATIVE NG/ML
PLATELET # BLD AUTO: 188 10E9/L (ref 150–450)
POTASSIUM SERPL-SCNC: 4.5 MMOL/L (ref 3.4–5.3)
RBC # BLD AUTO: 3.43 10E12/L (ref 4.4–5.9)
SODIUM SERPL-SCNC: 136 MMOL/L (ref 133–144)
SPECIMEN SOURCE: ABNORMAL
SPECIMEN SOURCE: ABNORMAL
SPECIMEN SOURCE: NORMAL
SPECIMEN SOURCE: NORMAL
WBC # BLD AUTO: 13 10E9/L (ref 4–11)

## 2020-05-11 PROCEDURE — 25000132 ZZH RX MED GY IP 250 OP 250 PS 637: Mod: GY

## 2020-05-11 PROCEDURE — 25000132 ZZH RX MED GY IP 250 OP 250 PS 637: Mod: GY | Performed by: STUDENT IN AN ORGANIZED HEALTH CARE EDUCATION/TRAINING PROGRAM

## 2020-05-11 PROCEDURE — 97110 THERAPEUTIC EXERCISES: CPT | Mod: GO | Performed by: OCCUPATIONAL THERAPIST

## 2020-05-11 PROCEDURE — 21400000 ZZH R&B CCU UMMC

## 2020-05-11 PROCEDURE — 80048 BASIC METABOLIC PNL TOTAL CA: CPT | Performed by: PHYSICIAN ASSISTANT

## 2020-05-11 PROCEDURE — 97535 SELF CARE MNGMENT TRAINING: CPT | Mod: GO | Performed by: OCCUPATIONAL THERAPIST

## 2020-05-11 PROCEDURE — 25000132 ZZH RX MED GY IP 250 OP 250 PS 637: Mod: GY | Performed by: SURGERY

## 2020-05-11 PROCEDURE — 99233 SBSQ HOSP IP/OBS HIGH 50: CPT | Mod: 24 | Performed by: INTERNAL MEDICINE

## 2020-05-11 PROCEDURE — 83605 ASSAY OF LACTIC ACID: CPT | Performed by: THORACIC SURGERY (CARDIOTHORACIC VASCULAR SURGERY)

## 2020-05-11 PROCEDURE — 25000128 H RX IP 250 OP 636: Performed by: PHYSICIAN ASSISTANT

## 2020-05-11 PROCEDURE — 25000132 ZZH RX MED GY IP 250 OP 250 PS 637: Mod: GY | Performed by: PHYSICIAN ASSISTANT

## 2020-05-11 PROCEDURE — 25000131 ZZH RX MED GY IP 250 OP 636 PS 637: Mod: GY | Performed by: PHYSICIAN ASSISTANT

## 2020-05-11 PROCEDURE — 87205 SMEAR GRAM STAIN: CPT

## 2020-05-11 PROCEDURE — 85520 HEPARIN ASSAY: CPT | Performed by: THORACIC SURGERY (CARDIOTHORACIC VASCULAR SURGERY)

## 2020-05-11 PROCEDURE — 85027 COMPLETE CBC AUTOMATED: CPT | Performed by: PHYSICIAN ASSISTANT

## 2020-05-11 PROCEDURE — 85610 PROTHROMBIN TIME: CPT | Performed by: THORACIC SURGERY (CARDIOTHORACIC VASCULAR SURGERY)

## 2020-05-11 PROCEDURE — 00000146 ZZHCL STATISTIC GLUCOSE BY METER IP

## 2020-05-11 RX ORDER — AMIODARONE HYDROCHLORIDE 200 MG/1
400 TABLET ORAL 2 TIMES DAILY
Status: DISCONTINUED | OUTPATIENT
Start: 2020-05-12 | End: 2020-05-13

## 2020-05-11 RX ORDER — ASPIRIN 81 MG/1
81 TABLET ORAL DAILY
Status: DISCONTINUED | OUTPATIENT
Start: 2020-05-11 | End: 2020-05-19 | Stop reason: HOSPADM

## 2020-05-11 RX ORDER — WARFARIN SODIUM 3 MG/1
3 TABLET ORAL
Status: COMPLETED | OUTPATIENT
Start: 2020-05-11 | End: 2020-05-11

## 2020-05-11 RX ADMIN — INSULIN ASPART 1 UNITS: 100 INJECTION, SOLUTION INTRAVENOUS; SUBCUTANEOUS at 11:35

## 2020-05-11 RX ADMIN — ATORVASTATIN CALCIUM 40 MG: 40 TABLET, FILM COATED ORAL at 20:06

## 2020-05-11 RX ADMIN — Medication 12.5 MG: at 20:06

## 2020-05-11 RX ADMIN — Medication 1 MG: at 22:18

## 2020-05-11 RX ADMIN — DIGOXIN 125 MCG: 0.06 TABLET ORAL at 08:33

## 2020-05-11 RX ADMIN — HEPARIN SODIUM 1500 UNITS/HR: 10000 INJECTION, SOLUTION INTRAVENOUS at 11:18

## 2020-05-11 RX ADMIN — HEPARIN SODIUM 1500 UNITS/HR: 10000 INJECTION, SOLUTION INTRAVENOUS at 02:46

## 2020-05-11 RX ADMIN — PANTOPRAZOLE SODIUM 40 MG: 40 TABLET, DELAYED RELEASE ORAL at 08:33

## 2020-05-11 RX ADMIN — MUPIROCIN 0.5 G: 20 OINTMENT TOPICAL at 08:35

## 2020-05-11 RX ADMIN — ASPIRIN 81 MG: 81 TABLET, COATED ORAL at 08:45

## 2020-05-11 RX ADMIN — GABAPENTIN 100 MG: 100 CAPSULE ORAL at 08:32

## 2020-05-11 RX ADMIN — FUROSEMIDE 40 MG: 40 TABLET ORAL at 08:33

## 2020-05-11 RX ADMIN — MUPIROCIN 0.5 G: 20 OINTMENT TOPICAL at 20:06

## 2020-05-11 RX ADMIN — WARFARIN SODIUM 3 MG: 3 TABLET ORAL at 17:11

## 2020-05-11 RX ADMIN — Medication 12.5 MG: at 09:08

## 2020-05-11 RX ADMIN — ACETAMINOPHEN 975 MG: 325 TABLET, FILM COATED ORAL at 08:33

## 2020-05-11 ASSESSMENT — ACTIVITIES OF DAILY LIVING (ADL)
ADLS_ACUITY_SCORE: 14

## 2020-05-11 ASSESSMENT — MIFFLIN-ST. JEOR: SCORE: 1999.6

## 2020-05-11 NOTE — PLAN OF CARE
6C PT: Attempted PT session this AM though pt had just received lunch, will check back as able this PM or reschedule per PT POC.

## 2020-05-11 NOTE — PROGRESS NOTES
Care Coordinator Progress Note    Admission Date/Time:  5/5/2020  Attending MD:  Yobany Holloway  Data  Chart reviewed, discussed with interdisciplinary team.   Patient was admitted for:    Coronary artery disease involving native coronary artery of native heart without angina pectoris  Coronary artery disease involving native coronary artery of native heart without angina pectoris  CAD (coronary artery disease).  Pt is now s/p CABG x 3 (LIMA to LAD, SVG to OM, SVG to right PDA)/bilateral pulmonary vein isolation/TORIBIO ligation on 5/8/20/20.    Concerns with insurance coverage for discharge needs: None stated by pt.  Current Living Situation: Patient lives alone. Pt told this writer that he will be staying with his brother, Reuben Schilling (Cell: [phone]) and his sister-in-law in [place], [place] and they are available to assist pt after discharge.   Support System: Supportive and Involved brother and sister-in-law.   Services Involved: none currently.   Transportation at Discharge: Pt's brother, Reuben will provide pt with a ride home upon discharge.   Transportation to Medical Appointments:  Name of caregiver: Reuben Schilling  Barriers to Discharge: post-op recovery.     Coordination of Care and Referrals: Provided patient/family with options for outpt INR & Warfarin monitoring.     Assessment     PT/OT recommending TCU but pt is declining and instead pt wants to go home and go to [facility]. This writer confirmed with pt's brother Reuben (cell: [phone]) that pt will stay with him upon discharge and Reuben will provide pt with a ride to his home and to pt's clinic appt on 5/18/2020.   Per PA, pt will need outpt INR and Warfarin monitoring arranged; pt is in agreement with this plan.   Intervention:      Arrangements made with the [facility] (Ph: [phone] Fax: [phone]) for INR and Warfarin monitoring (Goal=2-3). Indication for Anticoagulation: Atrial Fibrillation. Appointment made on 5/18/2020  at 10:45 AM with Dr. Hima Ag for INR lab draw, Warfarin monitoring, post hospitalization follow up.    Plan  Anticipated Discharge Date:  TBD  Anticipated Discharge Plan:  Discharge to home with outpt f/u.     CHANDAN DE LA PAZ RN BSN  Care Coordinator Unit   [phone]

## 2020-05-11 NOTE — PROGRESS NOTES
Social Work: Assessment with Discharge Plan    Patient Name:  Rudi Schilling  :  1950  Age:  70 year old  MRN:  [id number]  Risk/Complexity Score:  Filed Complexity Screen Score: 6  Completed assessment with:  Pt, pt's sister Isis [phone]    Presenting Information   Reason for Referral:  Discharge plan  Date of Intake:  May 11, 2020  Referral Source:  Chart Review  Decision Maker:  Patient  Alternate Decision Maker:  Per CARROL.   Health Care Directive:  Provided Copy and Patient considering completing  Living Situation:  House  Previous Functional Status:  Independent  Patient and family understanding of hospitalization:  Pt has appropriate understanding of hospitalization.   Cultural/Language/Spiritual Considerations:  Pt is a 70 year old male pt who identifies as Gnosticism.   Adjustment to Illness:  Pt appears to be adjusting well to illness.     Physical Health  Reason for Admission:    1. Coronary artery disease involving native coronary artery of native heart without angina pectoris    2. Coronary artery disease involving native coronary artery of native heart without angina pectoris    3. CAD (coronary artery disease)      Services Needed/Recommended:  TCU vs. ARU    Mental Health/Chemical Dependency  Diagnosis:  Per pt and chart review, none.   Support/Services in Place:  Per pt and chart review, none.   Services Needed/Recommended:  NA    Support System  Significant relationship at present time:  Pt' siblings are supportive.   Family of origin is available for support:  Yes, siblings, nieces and nephews.   Other support available:  NA  Gaps in support system:  Pt lives alone, does not have 24 hour caregiver.   Patient is caregiver to:  None     Provider Information   Primary Care Physician:  No Ref-Primary, Physician   None   Clinic:  No address on file      :  NA    Financial   Income Source:  USP, social security  Financial Concerns:  Pt reported no concerns.   Insurance:   "  Payor/Plan Subscriber Name Rel Member # Group #   MEDICARE - MEDICARE ANJALI PABLO S[employer]ARMEN[employer] [id number]       ATTN CLAIMS, [address]       Discharge Plan   Patient and family discharge goal:  Home with outpatient PT  Provided education on discharge plan:  YES  Patient agreeable to discharge plan:  NO  A list of Medicare Certified Facilities was provided to the patient and/or family to encourage patient choice. Patient's choices for facility are:  NA - pt and family do not wish to discharge to TCU  Will NH provide Skilled rehabilitation or complex medical:  NA  General information regarding anticipated insurance coverage and possible out of pocket cost was discussed. Patient and patient's family are aware patient may incur the cost of transportation to the facility, pending insurance payment: NA  Barriers to discharge:  Medical stability     Discharge Recommendations   Anticipated Disposition:  Home with services  Transportation Needs:  Family:  Per sister, family will come pick pt up  Name of Transportation Company and Phone:  NA    Additional comments   SOHAIL met with pt at bedside to introduce self and role on treatment team. SOHAIL discussed therapy recommendations for a TCU/ARU. Pt reports he does not wish to go to TCU/ARU and requests SW speak with his sister, Isis as she has \"everything planned out.\"    SOHAIL spoke with Isis. Isis reports the family has a plan for pt to live with his brother and sister-in-law and complete OP PT at [facility]. Pt's sister-in-law will be able to transport pt to necessary appointments. Isis stated pt would not want to go to a TCU/ARU. Isis stated a family member is able to pick pt up from [facility].    SOHAIL discussed with pt conversation with Isis. Pt confirms he does not wish to discharge to TCU or ARU. Pt denies any additional concerns or questions.    ISACC Bond, LGSW  6C/6D Adult Acute Care SW  Ph:[phone]  Pager [phone]  Unit " [phone]

## 2020-05-11 NOTE — PHARMACY-ANTICOAGULATION SERVICE
Clinical Pharmacy - Warfarin Dosing Consult     Pharmacy has been consulted to manage this patient s warfarin therapy.  Indication: Atrial Fibrillation  Therapy Goal: INR 2-3  Provider/Team: CVTS Team  Warfarin Prior to Admission: No  Significant drug interactions: Amiodarone (may see increased INR due to reduced warfarin metabolism), Aspirin and heparin drip (may see increased risk of bleeding)  Recent documented change in oral intake/nutrition: No    INR   Date Value Ref Range Status   05/11/2020 1.17 (H) 0.86 - 1.14 Final   05/08/2020 1.41 (H) 0.86 - 1.14 Final       Recommend warfarin 3 mg today.  Pharmacy will monitor Rudi Schilling daily and order warfarin doses to achieve specified goal.      Please contact pharmacy as soon as possible if the warfarin needs to be held for a procedure or if the warfarin goals change.      Gabriel Langston, PharmD -- pager [phone]

## 2020-05-11 NOTE — PROGRESS NOTES
"CT surgery Progress Note    Pt is a 70 year old male with a PHM significant for morbid obesity and recently diagnosed atrial fibrillation, multivessel coronary artery disease, and biventricular heart failure (EF 10% on TTE 4/21/20). He was transferred from outside hospital on 5/5/20 for further management and evaluation for possible revascularization.     Coronary artery disease, ischemic cardiomyopathy with reduced EF, and paroxysmal atrial fibrillation s/p CABG x 3 (LIMA to LAD, SVG to OM, SVG to right PDA)/bilateral pulmonary vein isolation/TORIBIO ligation (5/8/20) POD #3    Subjective:  States that pain is being controlled. Denies any hallucinations. Breathing is ok. Working with IS. Appetite is ok. Denies any nausea. +BM this am, denies any popping/clicking in his breast bone, has not ambulated, was able to get some sleep    Objective:  Up in chair, comfortable, +O2  /71 (BP Location: Left arm)   Pulse 90   Temp 99.4  F (37.4  C) (Oral)   Resp 18   Ht 1.778 m (5' 10\")   Wt 127.8 kg (281 lb 12.8 oz)   SpO2 99%   BMI 40.43 kg/m    CT removed  CV - IRRR, telemetry afib 100-120s, +edema LE  Pulm - trace crackles in bases,  ml  Abd - BS+, NT/ND  Derm - sternal incision D/I   L LE incisions D/I  Lab - WBC 13.0   Hgb/Hct 9.8/33   Plt 188   Na 136   K 4.5   Cr/BUN 0.82/25   Glucose 119-127  Echo (5/6/20) - moderately reduced LVF with EF 30-35%   Moderate diffuse hypokinesis   Mod to severe biatrial enlargement   No pericardial effusion   Atrial fibrillation  Carotid US (5/6/20) - R ICA less than 50% stenosis   L ICA 50-69% stenosis  Extubated 5/9/20 at 0455  transferred to step down unit on POD #1    A/P:  1. S/p CABG x 3/bilateral pulmonary vein isolation/TORIBIO ligation POD #3  2. PAF (had pre op also) - heparin gtt, start coumadin, amiodarone gtt with plan to change to amiodarone PO am 5/12, digoxin 125 mcg  3. HTN - start lopressor 12.5 mg PO bid with parameters  4. Hyperlipidemia - atorvastatin 40 mg "   5. HFrEF  6. Stress induced hyperglycemia - Hgb A1C 5.8% (5/5/20), sliding scale insulin   7. Morbid obesity  Encouraged IS/TCDB/amb. Sternal precautions. Lives in [place]. Continue to monitor.    Sorin Arana PA-C  [phone]

## 2020-05-11 NOTE — SUMMARY OF CARE
-Pt went into a-fib with a rvr of around 120 to 130 bpm at aproximately 0800am this morning.  -Orders were received and carried out to give the Pt a bolus of ammioderone through a PIV.  -Pt received orders the were carried out to have a PICC line placed and have an amioderone gtt started.  -A heparin gtt is scheduled to begin on the next shift.  -The Pt had several liquid stools that contained hard fragments. An enema was given during the final BM of the shift and the results are pending.

## 2020-05-11 NOTE — CONSULTS
"70 year old male presenting with CABG. CORE consult requested.      Rudi is from [facility]", close to [place]. He will follow up with the [facility] Isis Morocho in 1-2 weeks, [phone].      Thank you for the consult.    Caremla Arvizu RN BSN  Cardiology Care Coordinator - Heart Failure/ [facility]  [facility]   Questions and [phone]   First press #1 for the [place] and then press #4 for \"Send a message to your care team\"         "

## 2020-05-11 NOTE — PROGRESS NOTES
Cardiology Progress Note  5/6/2020      ASSESSMENT/PLAN:  Rudi Schilling is a 70 year old male with a PMH significant for morbid obesity and recently diagnosed atrial fibrillation, multivessel CAD and biventricular HFrEF (EF 10% on 4/21/2020 TTE) who was transferred from an OSH on 5/5/2020 for further management and evaluation for possible revascularization. S/P CABG 5/8/2020    Changes Today:  - Recommend restarting coreg 3.125mg PO BID, losartan 25mg po daily, lasix 40mg po daily   - Recommend discontinuing amio gtt     # Severe biventricular HFrEF, NYHA CLASS II, CHF STAGE D  # ICM with multivessel CAD  # S/P CABG 5/8/2020 LIMA to LAD, SVG to OM, SVG to right PDA)/bilateral pulmonary vein isolation/TORIBIO ligation   Most recent echo on 4/21/2020 showed EF of 10%, and coronary angiogram on 4/28/2020 revealed multivessel CAD.  Transferred to [facility] for further management and evaluation for revascularization. Patient denies any complaints at this time. He reports mild symptoms of SOB with exertion but denies chest pain or palpitations. He appears euvolemic on exam. TTE from 5/6 showed moderate diffuse hypokinesis LVEF 30-35%.   - ACEI/ARB: losartan 25mg po daily, recommend up titration as tolerated   - Beta Blocker: coreg 3.125mg po BID, recommended uptitrating as tolerated   - Aldosterone Antagonist: will restart spironolactone 25mg po daily as tolerated   - Diuresis: Appears euvolemic,continue lasix 40mg po daily   - Telemetry  - Strict Is/Os, daily weights, cardiac diet, fluid restriction (<1.5L daily)     # atrial fibrillation  CHADSVASC of 3 (age, vascular disease, and CHF).  Rates of 90s on admission.  - resume coreg 3.125mg po BID, continue digoxin 125mcg po daily   - discontinue amiodarone gtt   - heparin gtt for anticoagulation, resumption of PO anticoagulation per primary team      # Positive BCx w/ staph hominis, suspect contamination  Patient was noted to spike a fever on 4/29 at the OSH and was started on  "vancomycin/ceftriaxone after 1 of 2 blood cultures initially grew GPCs which eventually grew out staph hominis. Repeat BCx of  were negative for >24hours at time of discharge per chart review and antibiotics were discontinued. COVID negative  - repeat blood cultures at our facility NGTD   - will hold off on antibiotics at this time and monitor for fevers     CHRONIC MEDICAL PROBLEMS  # Chronic back pain: continue PTA cyclobenzaprine PRN  # GERD: continue PTA pantoprazole    FEN:Cardiac Diet  Ppx: Heparin gtt  Code: Full    Plan d/w Dr. Odin M.D., who is in agreement. Please, see staff addendum for changes/additions to the plan.    Radha Carpio  Cardiology PGY4    ===================================================================    SUBJECTIVE:   Patient had no complaints this AM.   Reported that he was doing well.     OBJECTIVE:  BP 90/64 (BP Location: Left arm)   Pulse 104   Temp 98.4  F (36.9  C) (Oral)   Resp 18   Ht 1.778 m (5' 10\")   Wt 122.3 kg (269 lb 11.2 oz)   SpO2 98%   BMI 38.70 kg/m    Temp (24hrs), Av.6  F (37  C), Min:98.2  F (36.8  C), Max:98.9  F (37.2  C)    I/O:  Intake/Output Summary (Last 24 hours) at 2020 1206  Last data filed at 2020 0659  Gross per 24 hour   Intake 1222.65 ml   Output 1800 ml   Net -577.35 ml       Wt:   Wt Readings from Last 5 Encounters:   20 122.3 kg (269 lb 11.2 oz)     GEN: pleasant, no acute distress  HEENT: no icterus  CV: RRR, normal s1/s2, no murmurs/rubs/s3/s4, no heave. Unable to assess JVP.   CHEST: clear to ausculation bilaterally, no rales or wheezing  ABD: soft, obese, non-tender, normal active bowel sounds  EXTR: Trace edema bilaterally  NEURO: alert oriented, speech fluent/appropriate, motor grossly nonfocal    Labs: reviewed in EMR  CBC  Recent Labs   Lab 20  0453 05/10/20  1541 05/10/20  1023 20  0413   WBC 13.0* 14.5* 14.0* 14.0*   RBC 3.43* 3.55* 3.69* 3.99*   HGB 9.8* 10.1* 10.5* 11.4*   HCT 33.0* 34.0* 35.6* " 37.7*   MCV 96 96 97 95   MCH 28.6 28.5 28.5 28.6   MCHC 29.7* 29.7* 29.5* 30.2*   RDW 14.6 14.5 14.5 14.2    176 175 222     BMP  Recent Labs   Lab 05/11/20  0453 05/10/20  1023 05/09/20  0413 05/08/20  2110 05/08/20  1528  05/07/20  0612 05/06/20  0407    135 141  --  140   < > 138 136   POTASSIUM 4.5 4.4 4.7 4.7 4.6   < > 4.4 4.8   CHLORIDE 101 102 108  --  106   < > 102 103   CO2 30 28 27  --  27   < > 29 31   ANIONGAP 5 5 6  --  8   < > 7 3   * 161* 147*  --  190*   < > 114* 108*   BUN 25 24 20  --  20   < > 16 16   CR 0.82 0.90 0.96  --  1.04   < > 0.86 1.00   GFRESTIMATED >90 87 81  --  73   < > 89 77   GFRESTBLACK >90 >90 >90  --  85   < > >90 89   SIDRA 8.8 8.9 8.6  --  8.6   < > 9.3 9.3   MAG  --   --  2.5*  --  2.7*  --  2.1 2.1   PHOS  --   --  4.2  --  3.1  --  3.6 3.6    < > = values in this interval not displayed.     Troponins:   No results found for: TROPI, TROPONIN, TROPR, TROPN  INR  Recent Labs   Lab 05/11/20  0831 05/08/20  1528 05/08/20  1341 05/07/20  1315   INR 1.17* 1.41* 1.85* 1.18*       Imaging: reviewed in EMR.    I have reviewed today's vital signs, notes, medications, labs and imaging.  I have also seen and examined the patient and agree with the findings and plan as outlined above.  Pt with VSS without complaitns.  Exam as above.  Labs with Cr 0.82.  Assessment: Pt with ischemic DCM S/P CABG on ASA and advancing afterload reducing agent.  Atrial fibrillation with rate control.  Will advance afterload reducing meds.     Tapan Newby MD, PhD  Professor, Heart Failure and Cardiac Transplantation  [facility]

## 2020-05-11 NOTE — PLAN OF CARE
OT/CR 6C: Discharge Planner OT   Patient plan for discharge: Pt plans to discharge to his sister's split level home with OP Phase II CR  Current status: Pt progressing today; however, continues to be below baseline.  Unable to recall sternal precautions though receptive to education, requires cues for compliance throughout.  Mod A supine <> EOB, CGA-Min A sit to stand, CGA with FWW ambulating approx 2 x 125ft.  VSS on 2L  Barriers to return to prior living situation: Pt lives alone (baseline), stairs (at his sister's home), post surgical precautions, weakness, deconditioning, acute medical needs  Recommendations for discharge: If pt were to discharge in the next 24hr or so, would likely still benefit from TCU stay; however, pending LOS, continued progress with therapy and safe stair navigation, pt may be able to progress to discharge to his sister's home with assist and home vs OP therapy  Rationale for recommendations: Pt is currently below baseline with regards to mobility and independence with self cares and will benefit from continued skilled therapy intervention to address deficits.         Entered by: Honey Alvarez 05/11/2020 1:57 PM

## 2020-05-11 NOTE — PLAN OF CARE
D: Pt admitted from OSH for management of recently diagnosed Afib and multivessel CAD w/biventricular HFrEF. Now s/p CABG on 5/8.    I/A: AVSS, on 2-3L NC sating mid 90s. Afib, 90s-100s. Denies pain. AOx4. CT dressing cdi. Frequent cough. Sputum sample collected. 2g Na diet. BG checks ACHS. +BS. No BM overnight. Voiding. DL PICC and PIV. Amiodarone gtt infusing at 0.5mg/hr. Heparin gtt started last evening and now infusing at 1500 units/hr. Next hep10a needs to be drawn at 0830. Pt is PCU collect. Up w/2 and walker. R groin is dry and intact. L knee incision vivienne. Midline incision vivienne, dry and intact.     P: Continue to monitor and follow POC. Notify team of any changes.

## 2020-05-12 ENCOUNTER — APPOINTMENT (OUTPATIENT)
Dept: GENERAL RADIOLOGY | Facility: CLINIC | Age: 70
DRG: 234 | End: 2020-05-12
Attending: PHYSICIAN ASSISTANT
Payer: MEDICARE

## 2020-05-12 ENCOUNTER — APPOINTMENT (OUTPATIENT)
Dept: PHYSICAL THERAPY | Facility: CLINIC | Age: 70
DRG: 234 | End: 2020-05-12
Attending: THORACIC SURGERY (CARDIOTHORACIC VASCULAR SURGERY)
Payer: MEDICARE

## 2020-05-12 LAB
ANION GAP SERPL CALCULATED.3IONS-SCNC: 4 MMOL/L (ref 3–14)
BUN SERPL-MCNC: 26 MG/DL (ref 7–30)
CALCIUM SERPL-MCNC: 8.6 MG/DL (ref 8.5–10.1)
CHLORIDE SERPL-SCNC: 99 MMOL/L (ref 94–109)
CO2 SERPL-SCNC: 30 MMOL/L (ref 20–32)
CREAT SERPL-MCNC: 0.81 MG/DL (ref 0.66–1.25)
GFR SERPL CREATININE-BSD FRML MDRD: >90 ML/MIN/{1.73_M2}
GLUCOSE BLDC GLUCOMTR-MCNC: 106 MG/DL (ref 70–99)
GLUCOSE BLDC GLUCOMTR-MCNC: 114 MG/DL (ref 70–99)
GLUCOSE BLDC GLUCOMTR-MCNC: 116 MG/DL (ref 70–99)
GLUCOSE BLDC GLUCOMTR-MCNC: 138 MG/DL (ref 70–99)
GLUCOSE SERPL-MCNC: 112 MG/DL (ref 70–99)
INR PPP: 1.17 (ref 0.86–1.14)
LACTATE BLD-SCNC: 0.8 MMOL/L (ref 0.7–2)
LMWH PPP CHRO-ACNC: 0.1 IU/ML
LMWH PPP CHRO-ACNC: 0.24 IU/ML
POTASSIUM SERPL-SCNC: 4.4 MMOL/L (ref 3.4–5.3)
SODIUM SERPL-SCNC: 133 MMOL/L (ref 133–144)

## 2020-05-12 PROCEDURE — 25000128 H RX IP 250 OP 636: Performed by: PHYSICIAN ASSISTANT

## 2020-05-12 PROCEDURE — 21400000 ZZH R&B CCU UMMC

## 2020-05-12 PROCEDURE — 83605 ASSAY OF LACTIC ACID: CPT | Performed by: THORACIC SURGERY (CARDIOTHORACIC VASCULAR SURGERY)

## 2020-05-12 PROCEDURE — 97116 GAIT TRAINING THERAPY: CPT | Mod: GP

## 2020-05-12 PROCEDURE — 25000132 ZZH RX MED GY IP 250 OP 250 PS 637: Mod: GY

## 2020-05-12 PROCEDURE — 85520 HEPARIN ASSAY: CPT | Performed by: PHYSICIAN ASSISTANT

## 2020-05-12 PROCEDURE — 25000132 ZZH RX MED GY IP 250 OP 250 PS 637: Mod: GY | Performed by: STUDENT IN AN ORGANIZED HEALTH CARE EDUCATION/TRAINING PROGRAM

## 2020-05-12 PROCEDURE — 36592 COLLECT BLOOD FROM PICC: CPT | Performed by: PHYSICIAN ASSISTANT

## 2020-05-12 PROCEDURE — 36592 COLLECT BLOOD FROM PICC: CPT | Performed by: THORACIC SURGERY (CARDIOTHORACIC VASCULAR SURGERY)

## 2020-05-12 PROCEDURE — 25000132 ZZH RX MED GY IP 250 OP 250 PS 637: Mod: GY | Performed by: SURGERY

## 2020-05-12 PROCEDURE — 00000146 ZZHCL STATISTIC GLUCOSE BY METER IP

## 2020-05-12 PROCEDURE — 25000132 ZZH RX MED GY IP 250 OP 250 PS 637: Mod: GY | Performed by: THORACIC SURGERY (CARDIOTHORACIC VASCULAR SURGERY)

## 2020-05-12 PROCEDURE — 97530 THERAPEUTIC ACTIVITIES: CPT | Mod: GP

## 2020-05-12 PROCEDURE — 71046 X-RAY EXAM CHEST 2 VIEWS: CPT

## 2020-05-12 PROCEDURE — 25000132 ZZH RX MED GY IP 250 OP 250 PS 637: Mod: GY | Performed by: PHYSICIAN ASSISTANT

## 2020-05-12 PROCEDURE — 85520 HEPARIN ASSAY: CPT | Performed by: THORACIC SURGERY (CARDIOTHORACIC VASCULAR SURGERY)

## 2020-05-12 PROCEDURE — 99233 SBSQ HOSP IP/OBS HIGH 50: CPT | Mod: 24 | Performed by: INTERNAL MEDICINE

## 2020-05-12 PROCEDURE — 80048 BASIC METABOLIC PNL TOTAL CA: CPT | Performed by: PHYSICIAN ASSISTANT

## 2020-05-12 PROCEDURE — 85610 PROTHROMBIN TIME: CPT | Performed by: PHYSICIAN ASSISTANT

## 2020-05-12 RX ORDER — CARVEDILOL 3.12 MG/1
3.12 TABLET ORAL 2 TIMES DAILY WITH MEALS
Status: DISCONTINUED | OUTPATIENT
Start: 2020-05-12 | End: 2020-05-13

## 2020-05-12 RX ORDER — WARFARIN SODIUM 3 MG/1
3 TABLET ORAL
Status: COMPLETED | OUTPATIENT
Start: 2020-05-12 | End: 2020-05-12

## 2020-05-12 RX ORDER — POTASSIUM CHLORIDE 750 MG/1
40 TABLET, EXTENDED RELEASE ORAL ONCE
Status: COMPLETED | OUTPATIENT
Start: 2020-05-12 | End: 2020-05-12

## 2020-05-12 RX ORDER — AMOXICILLIN 250 MG
1 CAPSULE ORAL AT BEDTIME
Status: DISCONTINUED | OUTPATIENT
Start: 2020-05-12 | End: 2020-05-14

## 2020-05-12 RX ORDER — FUROSEMIDE 10 MG/ML
20 INJECTION INTRAMUSCULAR; INTRAVENOUS EVERY 4 HOURS
Status: COMPLETED | OUTPATIENT
Start: 2020-05-12 | End: 2020-05-12

## 2020-05-12 RX ORDER — FUROSEMIDE 10 MG/ML
40 INJECTION INTRAMUSCULAR; INTRAVENOUS ONCE
Status: COMPLETED | OUTPATIENT
Start: 2020-05-12 | End: 2020-05-12

## 2020-05-12 RX ADMIN — FUROSEMIDE 40 MG: 10 INJECTION, SOLUTION INTRAMUSCULAR; INTRAVENOUS at 08:21

## 2020-05-12 RX ADMIN — AMIODARONE HYDROCHLORIDE 400 MG: 200 TABLET ORAL at 20:10

## 2020-05-12 RX ADMIN — ASPIRIN 81 MG: 81 TABLET, COATED ORAL at 08:20

## 2020-05-12 RX ADMIN — FUROSEMIDE 20 MG: 10 INJECTION, SOLUTION INTRAVENOUS at 16:08

## 2020-05-12 RX ADMIN — Medication 5 ML: at 13:12

## 2020-05-12 RX ADMIN — AMIODARONE HYDROCHLORIDE 400 MG: 200 TABLET ORAL at 08:15

## 2020-05-12 RX ADMIN — POTASSIUM CHLORIDE 40 MEQ: 750 TABLET, EXTENDED RELEASE ORAL at 16:08

## 2020-05-12 RX ADMIN — HEPARIN SODIUM 1500 UNITS/HR: 10000 INJECTION, SOLUTION INTRAVENOUS at 03:46

## 2020-05-12 RX ADMIN — SENNOSIDES AND DOCUSATE SODIUM 1 TABLET: 8.6; 5 TABLET ORAL at 20:10

## 2020-05-12 RX ADMIN — PANTOPRAZOLE SODIUM 40 MG: 40 TABLET, DELAYED RELEASE ORAL at 08:15

## 2020-05-12 RX ADMIN — CARVEDILOL 3.12 MG: 3.12 TABLET, FILM COATED ORAL at 08:20

## 2020-05-12 RX ADMIN — HEPARIN SODIUM 1650 UNITS/HR: 10000 INJECTION, SOLUTION INTRAVENOUS at 20:09

## 2020-05-12 RX ADMIN — FUROSEMIDE 20 MG: 10 INJECTION, SOLUTION INTRAVENOUS at 11:05

## 2020-05-12 RX ADMIN — DIGOXIN 125 MCG: 0.06 TABLET ORAL at 08:16

## 2020-05-12 RX ADMIN — ATORVASTATIN CALCIUM 40 MG: 40 TABLET, FILM COATED ORAL at 20:10

## 2020-05-12 RX ADMIN — WARFARIN SODIUM 3 MG: 3 TABLET ORAL at 18:43

## 2020-05-12 RX ADMIN — CARVEDILOL 3.12 MG: 3.12 TABLET, FILM COATED ORAL at 18:43

## 2020-05-12 ASSESSMENT — MIFFLIN-ST. JEOR
SCORE: 2082.61
SCORE: 1891.65

## 2020-05-12 ASSESSMENT — ACTIVITIES OF DAILY LIVING (ADL)
ADLS_ACUITY_SCORE: 14

## 2020-05-12 NOTE — PLAN OF CARE
Cared for pt between [id number]. No change in pt status from previous RN note. AVSS, on 1.5L NC. Denies pain. BMx1, soft brown. Amiodarone gtt stopped. Heparin gtt therapeutic at 1500 units/hr. Continue to monitor and notify team of any changes.

## 2020-05-12 NOTE — CONSULTS
"70 year old male presenting with CP and HF. CORE consult requested. Rudi  is currently admitted with HF    Rudi is a pt from [place], per his request he would like to follow-up in [place]. Called [facility]. Appointment made for Wednesday May 27th at 10:30 am with a 10:20 am check in time. Bryan will contact pt to confirm appt date and time. Thank you for the consult.    Karina Sahu RN  Cardiology Care Coordinator - [facility]  Questions and [phone]  First press #1 for the [place] and then press #3 for \"Medical Advise\" to reach us Cardiology Nurses.   "

## 2020-05-12 NOTE — PROGRESS NOTES
CLINICAL NUTRITION SERVICES    Reason for Assessment:  Heart-healthy nutrition education, pt s/p CABG    Diet History:  Pt reports no history of receiving heart-healthy nutrition education in the past. States he recently started following a low-sodium diet this hospital admission. Agreeable to learning about heart-healty eating.      Nutrition Diagnosis:  Food- and nutrition-related knowledge deficit r/t no previous knowledge of heart-healthy diet AEB pt report of no previous formal heart-healthy nutrition education.    Nutrition Prescription/Recs:  Continue heart-healthy diet.      Interventions:  Nutrition Education: Provided verbal instruction on a heart-healthy diet, focusing on low-sodium diet. Provided handouts: How to Read Nutrition Labels and Nutrition Care Manual Handouts on Heart-Healthy Eating Nutrition Therapy. Encouraged high protein options post-op for healing.    Medical food supplement therapy: Discussed oral supplements with pt. Ordered chocolate Boost Plus at HS. Changed between meal Boost Plus order to prn, with meals.      Collaboration with other providers: Per discussion with RN, no plans for LVAD in the near future.     Goals:    Pt will list at least two interventions to make current meal plan more heart-healthy.     Follow-up:   Patient to ask any further nutrition-related questions before discharge. In addition, pt may request outpatient RD appointment.    Allison Stover, MS, RD, LD, [facility]   6C Pgr: [phone]

## 2020-05-12 NOTE — PLAN OF CARE
6C PT  Discharge Planner PT   Patient plan for discharge: Brother's home and OP CR  Current status: Pt requires reminders to adhere to sternal precautions and demonstrates poor adherence during transfers. Jean [place] required for sit<>stand. Pt ambulates [id number] with FWW, SBA, and wc follow. Attempted very short distance ambulation without FWW though pt requires hand hold assist. Jean Claude and 1 hand rail required for stair navigation x3 stairs. Pt fatigues very quickly with functional activities.   Barriers to return to prior living situation: Medical status, sternal precautions, decreased activity tolerance, stairs at brother's home  Recommendations for discharge: ARC  Rationale for recommendations: Pt below functional baseline and requires assist to maintain sternal precautions with mobility. Pt would benefit from continued skilled intensive rehab services to maximize functional independence and facilitate safe discharge home. Anticipate pt would tolerate 3hrs of therapy per day.       Entered by: Ginette Peng 05/12/2020 12:20 PM

## 2020-05-12 NOTE — PLAN OF CARE
D/A/I:  Patient POD 3 after CABG, A&O x4.  Denied pain, palpitations, dizziness, and nausea.  Had MIMS, lung sounds diminished in bases, fine crackles auscultated in right lung base.  Heart sounds intermittently distant.  Had 1-2+ edema in legs and feet, was given scheduled furosemide.  Patient had good urine output, see I&O flowsheet.  Had frequent small loose stools, stool softener held this morning.  See PCS for assessment and treatment of surgical incisions.  In atrial fibrillation with HR 80s-100s, SBP 90s-120s, SaO2 96-99% on 1.5L O2 via nasal cannula.  Heparin gtt continued at 1500 units/hr, 10a level therapeutic.  Amiodarone gtt continued at 0.5 mg/min (5 ml/hr).  Ambulated in hallway with 1-person assist and use of walker, needed 2-person assist to stand.  Provided Acapella breathing device for patient, gave instruction on proper use.    P:  Continue to monitor pain, VS, heart rhythm, skin and surgical site integrity, fluid status, bowel status, cardiac and respiratory status.  Notify care team of changes in patient condition or other concerns.  Transition to oral amiodarone this evening, discontinue amiodarone gtt tonight at 2200.  Expected to discharge 5/12, brother will pick patient up at front door.

## 2020-05-12 NOTE — PROGRESS NOTES
"BP 98/77 (BP Location: Left arm)   Pulse 98   Temp 99.6  F (37.6  C) (Oral)   Resp 16   Ht 1.778 m (5' 10\")   Wt 130.6 kg (288 lb)   SpO2 90%   BMI 41.32 kg/m       Neuro: A&Ox4.   Cardiac: Afebrile, VSS.   Respiratory: RA   GI/: Voiding spontaneously. BM this shift.   Diet/appetite: Tolerating diet. Denies nausea   Activity: Up SBA  Pain: Denies   Skin: No new deficits noted.  Lines:PICC  Drains:None  No new complaints.Will continue to monitor and follow plan of care.       "

## 2020-05-12 NOTE — PROGRESS NOTES
Cardiology Progress Note  5/6/2020      ASSESSMENT/PLAN:  Rudi Schilling is a 70 year old male with a PMH significant for morbid obesity and recently diagnosed atrial fibrillation, multivessel CAD and biventricular HFrEF (EF 10% on 4/21/2020 TTE) who was transferred from an OSH on 5/5/2020 for further management and evaluation for possible revascularization. S/P CABG 5/8/2020    Changes Today:  - Give lasix 40mg IV x1, may need to redose depending on response  - Check BMP   - Recommend discontinuing amiodarone PO, start losartan 25mg po daily   - Continue coreg 3.125mg po BID     # Severe biventricular HFrEF, NYHA CLASS II, CHF STAGE D  # ICM with multivessel CAD  # S/P CABG 5/8/2020 LIMA to LAD, SVG to OM, SVG to right PDA)/bilateral pulmonary vein isolation/TORIBIO ligation   Most recent echo on 4/21/2020 showed EF of 10%, and coronary angiogram on 4/28/2020 revealed multivessel CAD.  Transferred to [facility] for further management and evaluation for revascularization. Patient denies any complaints at this time. He reports mild symptoms of SOB with exertion but denies chest pain or palpitations. He appears euvolemic on exam. TTE from 5/6 showed moderate diffuse hypokinesis LVEF 30-35%.   - ACEI/ARB: start losartan 25mg po daily, recommend up titration as tolerated   - Beta Blocker: continue coreg 3.125mg po BID, recommended uptitrating as tolerated   - Aldosterone Antagonist: will restart spironolactone 25mg po daily as tolerated   - Diuresis: Appears hypervolemic, lasix 40mg IV x1, may need to redose   - Telemetry   - Strict Is/Os, daily weights, cardiac diet, fluid restriction (<1.5L daily)     # atrial fibrillation  CHADSVASC of 3 (age, vascular disease, and CHF).  Rates of 90s on admission.  - resume coreg 3.125mg po BID, continue digoxin 125mcg po daily   - discontinue amiodarone gtt   - stop PO amiodarone   - heparin gtt for anticoagulation, resumption of PO anticoagulation per primary team      CHRONIC MEDICAL  "PROBLEMS  # Chronic back pain: continue PTA cyclobenzaprine PRN  # GERD: continue PTA pantoprazole    FEN:Cardiac Diet  Ppx: Heparin gtt  Code: Full    Plan d/w Dr. Odin M.D., who is in agreement. Please, see staff addendum for changes/additions to the plan.    Radha Carpio  Cardiology PGY4    ===================================================================    SUBJECTIVE:   Patient reported that abdomen feels more distended     OBJECTIVE:  BP 98/77 (BP Location: Left arm)   Pulse 98   Temp 99.6  F (37.6  C) (Oral)   Resp 16   Ht 1.778 m (5' 10\")   Wt 130.6 kg (288 lb)   SpO2 90%   BMI 41.32 kg/m    Temp (24hrs), Av.6  F (37  C), Min:98.2  F (36.8  C), Max:98.9  F (37.2  C)    I/O:  Intake/Output Summary (Last 24 hours) at 2020 1206  Last data filed at 2020 0659  Gross per 24 hour   Intake 1222.65 ml   Output 1800 ml   Net -577.35 ml       Wt:   Wt Readings from Last 5 Encounters:   20 130.6 kg (288 lb)     GEN: pleasant, no acute distress  HEENT: no icterus  CV: RRR, normal s1/s2, no murmurs/rubs/s3/s4, no heave. +JVD  CHEST: clear to ausculation bilaterally, no rales or wheezing  ABD: soft, obese, non-tender, normal active bowel sounds  EXTR: Trace edema bilaterally  NEURO: alert oriented, speech fluent/appropriate, motor grossly nonfocal    Labs: reviewed in EMR  CBC  Recent Labs   Lab 20  0453 05/10/20  1541 05/10/20  1023 20  0413   WBC 13.0* 14.5* 14.0* 14.0*   RBC 3.43* 3.55* 3.69* 3.99*   HGB 9.8* 10.1* 10.5* 11.4*   HCT 33.0* 34.0* 35.6* 37.7*   MCV 96 96 97 95   MCH 28.6 28.5 28.5 28.6   MCHC 29.7* 29.7* 29.5* 30.2*   RDW 14.6 14.5 14.5 14.2    176 175 222     BMP  Recent Labs   Lab 20  0453 05/10/20  1023 20  0413 20  2110 20  1528  20  0612 20  0407    135 141  --  140   < > 138 136   POTASSIUM 4.5 4.4 4.7 4.7 4.6   < > 4.4 4.8   CHLORIDE 101 102 108  --  106   < > 102 103   CO2 30 28 27  --  27   < > 29 31 "   ANIONGAP 5 5 6  --  8   < > 7 3   * 161* 147*  --  190*   < > 114* 108*   BUN 25 24 20  --  20   < > 16 16   CR 0.82 0.90 0.96  --  1.04   < > 0.86 1.00   GFRESTIMATED >90 87 81  --  73   < > 89 77   GFRESTBLACK >90 >90 >90  --  85   < > >90 89   SIDRA 8.8 8.9 8.6  --  8.6   < > 9.3 9.3   MAG  --   --  2.5*  --  2.7*  --  2.1 2.1   PHOS  --   --  4.2  --  3.1  --  3.6 3.6    < > = values in this interval not displayed.     Troponins:   No results found for: TROPI, TROPONIN, TROPR, TROPN  INR  Recent Labs   Lab 05/12/20  0537 05/11/20  0831 05/08/20  1528 05/08/20  1341   INR 1.17* 1.17* 1.41* 1.85*       Imaging: reviewed in EMR.    I have reviewed today's vital signs, notes, medications, labs and imaging.  I have also seen and examined the patient and agree with the findings and plan as outlined above.  Pt without complaints.  VSS with  and RR 16. Lungs clear and distant S1 and S2. Labs as above with Cr 0.8.  Assessment: Pt with CABG and EF 40%.  Plan is to begin diuresis and increase afterload reduction meds.      Tapan Newby MD, PhD  Professor, Heart Failure and Cardiac Transplantation  [facility]

## 2020-05-12 NOTE — PLAN OF CARE
D/He declined to demonstrate use of I/S for me. He told me he just did it.   A/incisions are healing, groin site covered, leg incision appears to be healing. He says he is able to move with help of one person. He continues on Heparin drip with therapeutic recheck of XA. He is on comadin with lower INR. He continues in A fib  P/monitor for changes, encourage him to do things himself ( water glass, void in urinal, hold urinal in place, etc)  A/incontinent of stool and calls frequently with repeated requests, often calls q 2-5 minutes.

## 2020-05-12 NOTE — PLAN OF CARE
Pt has been a/o x 4 overnight. Denies pain. Tele Afib with rates low 100's. Heparin gtt continues at 1500units/hr. Awaiting am 10a result. Will start on po amio today. Gtt d/c'd last pm. Sternal and leg incicions CDI. Gets up to commode with assist of 2 and walker to void. Had BM and urinated. Got up to chair x 1. Will continue to monitor pt.

## 2020-05-12 NOTE — PROGRESS NOTES
Cardiovascular Surgery Progress Note  5/12/2020         Assessment and Plan:     Rudi Schilling is a 70 year old male with a PMH of morbid obesity, recently diagnosed atrial fibrillation, multivessel CAD, and biventricular HFrEF (EF 10% on 4/21/2020 TTE) who was transferred from an OSH ([place], [place]) on 5/5/2020 for further management and evaluation for possible revascularization. Thallium study from [place] showed viability in all 3 territories.  Upon arrival, patient was noted to be in A. fib with RVR with heart rates in the 140s volume overload 2+ lower extremity edema, and labs suggestive of YAHIR, congestive hepatopathy and hyperkalemia.  Patient was started on IV diuretics, heparin drip, and digoxin for rate control (Eliquis and metoprolol were held at that time).  Patient was continued on IV diuresis and medical optimization with up titration of beta-blockers and afterload reduction.  YAHIR, congestive hepatopathy and hyperkalemia resolved with continued diuresis. He had preliminary limited workup for LVAD placement.   He is now S/P CABG with bilateral pulmonary vein isolation and TORIBIO ligation 5/8/2020 with Dr Holloway.     Cardiovascular:   Severe biventricular HFrEF, NYHA CLASS II, CHF STAGE D  ICM with multivessel CAD, now S/P 3 v CABG  HTN  - Coreg 3.125 mg BID (PTA 6.25 mg). Aspirin 81 mg daily. Atorvastatin daily  - Most recent echo (May 6) showed LV EF 30-35% with moderate RV dysfunction  Atrial Fibrillation   - Pre-op diagnosis, now s/p TORIBIO ligation and pulm vein isolation   - Amiodarone 400 mg PO and digoxin 125 mg daily   - CHADSVASC of 3 (age, vascular disease, and CHF).  Anticoagulation with coumadin for 3 months before restarting Eliquis, bridging with Hep gtt (PTA was treat with Eliquis)   Hypervolemia  - Lasix 40 mg IV on 5/12 am with good UOP (didn't get the urinal in time), will try 20 mg IV x 2 more doses on 5/12.     Pulmonary:   - Extubated POD 1.  - Stable on 1-2 lpm. Supplemental O2  PRN to keep sats > 92%. Wean off as tolerated.  - Pulm toilet, IS, activity and deep breathing    Neurology / MSK:  - Hx chronic back pain but is stable and controlled   - Neuro intact, slight L sided facial droop that he says is normal for him  - Acute post-operative pain; pain well controlled with acetaminophen, PO oxycodone PRN     / Renal:  - No Hx of renal disease. Most recent creatinine WNL, adequate UOP on diuretics.     GI / FEN:   GERD  - 2 gram sodium diet, continue bowel regimen  - Replace electrolytes as needed, hepatic enzymes WNL.    Endocrine:  - Stress induced hyperglycemia initially managed on insulin drip postop, transitioned to sliding scale goal BG <180.      Infectious Disease:  Positive BCx w/ staph hominis, suspect contamination  Pre-operative Fevers - resolved  - No further + blood cultures at our facility  - Completed perioperative antibiotics. No current need for ABx, monitoring daily  - WBC 13.0 yesterday, remains afebrile, no signs or symptoms of infection    Hematology:   - Acute blood loss anemia; Hgb 9.8  - Plt WNL, no signs or symptoms of active bleeding    Anticoagulation:   - Coumadin for atrial fibrillation, INR goal 2-3. Most recent INR 1.17    - Low intensity heparin gtt bridging to therapeutic INR.    Prophylaxis:   - Stress ulcer prophylaxis: Pantoprazole 40 mg daily for 30 days  - DVT prophylaxis: SCD    Disposition:   - Transferred to  on 5/9   - Rehab currently recommending discharge to TCU but potentially home with assist   - [facility], [phone].    - Await therapeutic INR. Coumadin for 3 months (goal 2-3) for A-fib before restarting Eliquis     Discussed with CVTS Fellow as needed.  Staff surgeons have been informed of changes through both verbal and written communication.      Rodríguez Soriano PA-C  Cardiothoracic Surgery  Pager [phone]    7:23 AM   May 12, 2020          Interval History:  "    No overnight events.   States pain is well managed on current regimen. Slept well overnight.  Tolerating diet, is passing flatus, + loose BM. No nausea or vomiting.  Breathing well without complaints.   Working with therapies and ambulating in halls with heavy assistance.   Denies chest pain, palpitations, dizziness, syncopal symptoms, fevers, chills, myalgias, or sternal popping/clicking.         Physical Exam:   Blood pressure 126/86, pulse 104, temperature 98.6  F (37  C), temperature source Oral, resp. rate 22, height 1.778 m (5' 10\"), weight 111.5 kg (245 lb 14.4 oz), SpO2 97 %.  Vitals:    05/10/20 0613 05/11/20 0810 05/12/20 0105   Weight: 127.8 kg (281 lb 12.8 oz) 122.3 kg (269 lb 11.2 oz) 111.5 kg (245 lb 14.4 oz)      Weight; + 1 kg since admit and trending up  24 hr Fluid status; difficulty getting accurate amounts    MAPs: 89 - 114       Gen: A&Ox4, NAD  Neuro: no focal deficits besides slight L facial droop  CV: irregularly irregular, normal S1 S2, no murmurs, rubs or gallops. + JVD  Pulm: anterior apical CTA, no wheezing or rhonchi, normal breathing on RA  Abd: nondistended, normal BS, soft, nontender  Ext: trace (L>R) periperal edema, 1+ pitting  Incision: clean, dry, intact, no erythema, sternum stable  Tubes/drain sites: dressing clean and dry         Data:    Imaging:  reviewed recent imaging, no acute concerns    Labs:  BMP  Recent Labs   Lab 05/11/20  0453 05/10/20  1023 05/09/20  0413 05/08/20  2110 05/08/20  1528    135 141  --  140   POTASSIUM 4.5 4.4 4.7 4.7 4.6   CHLORIDE 101 102 108  --  106   SIDRA 8.8 8.9 8.6  --  8.6   CO2 30 28 27  --  27   BUN 25 24 20  --  20   CR 0.82 0.90 0.96  --  1.04   * 161* 147*  --  190*     CBC  Recent Labs   Lab 05/11/20  0453 05/10/20  1541 05/10/20  1023 05/09/20  0413   WBC 13.0* 14.5* 14.0* 14.0*   RBC 3.43* 3.55* 3.69* 3.99*   HGB 9.8* 10.1* 10.5* 11.4*   HCT 33.0* 34.0* 35.6* 37.7*   MCV 96 96 97 95   MCH 28.6 28.5 28.5 28.6   MCHC 29.7* " 29.7* 29.5* 30.2*   RDW 14.6 14.5 14.5 14.2    176 175 222     INR  Recent Labs   Lab 05/12/20  0537 05/11/20  0831 05/08/20  1528 05/08/20  1341   INR 1.17* 1.17* 1.41* 1.85*      Liver Function Studies -   Recent Labs   Lab Test 05/09/20  0413   PROTTOTAL 6.3*   ALBUMIN 3.3*   BILITOTAL 0.6   ALKPHOS 34*   AST 36   ALT 21     GLUCOSE:   Recent Labs   Lab 05/11/20  2203 05/11/20  1835 05/11/20  1750 05/11/20  1108 05/11/20  0453 05/10/20  2152 05/10/20  1720  05/10/20  1023  05/09/20  0413  05/08/20  1528 05/08/20  1415 05/08/20  1338   GLC  --   --   --   --  119*  --   --   --  161*  --  147*  --  190* 207* 197*   * 113* 105* 143*  --  127* 126*   < >  --    < >  --    < >  --   --   --     < > = values in this interval not displayed.

## 2020-05-13 ENCOUNTER — APPOINTMENT (OUTPATIENT)
Dept: PHYSICAL THERAPY | Facility: CLINIC | Age: 70
DRG: 234 | End: 2020-05-13
Attending: THORACIC SURGERY (CARDIOTHORACIC VASCULAR SURGERY)
Payer: MEDICARE

## 2020-05-13 ENCOUNTER — APPOINTMENT (OUTPATIENT)
Dept: OCCUPATIONAL THERAPY | Facility: CLINIC | Age: 70
DRG: 234 | End: 2020-05-13
Attending: THORACIC SURGERY (CARDIOTHORACIC VASCULAR SURGERY)
Payer: MEDICARE

## 2020-05-13 LAB
ALBUMIN SERPL-MCNC: 2.6 G/DL (ref 3.4–5)
ALP SERPL-CCNC: 42 U/L (ref 40–150)
ALT SERPL W P-5'-P-CCNC: 25 U/L (ref 0–70)
ANION GAP SERPL CALCULATED.3IONS-SCNC: 5 MMOL/L (ref 3–14)
AST SERPL W P-5'-P-CCNC: 30 U/L (ref 0–45)
BASOPHILS # BLD AUTO: 0.1 10E9/L (ref 0–0.2)
BASOPHILS NFR BLD AUTO: 0.5 %
BILIRUB SERPL-MCNC: 0.7 MG/DL (ref 0.2–1.3)
BUN SERPL-MCNC: 24 MG/DL (ref 7–30)
CALCIUM SERPL-MCNC: 8.6 MG/DL (ref 8.5–10.1)
CHLORIDE SERPL-SCNC: 100 MMOL/L (ref 94–109)
CO2 SERPL-SCNC: 28 MMOL/L (ref 20–32)
CREAT SERPL-MCNC: 0.81 MG/DL (ref 0.66–1.25)
DIFFERENTIAL METHOD BLD: ABNORMAL
EOSINOPHIL # BLD AUTO: 0.8 10E9/L (ref 0–0.7)
EOSINOPHIL NFR BLD AUTO: 7.7 %
ERYTHROCYTE [DISTWIDTH] IN BLOOD BY AUTOMATED COUNT: 14.4 % (ref 10–15)
GFR SERPL CREATININE-BSD FRML MDRD: >90 ML/MIN/{1.73_M2}
GLUCOSE BLDC GLUCOMTR-MCNC: 109 MG/DL (ref 70–99)
GLUCOSE BLDC GLUCOMTR-MCNC: 119 MG/DL (ref 70–99)
GLUCOSE BLDC GLUCOMTR-MCNC: 143 MG/DL (ref 70–99)
GLUCOSE SERPL-MCNC: 120 MG/DL (ref 70–99)
HCT VFR BLD AUTO: 30.6 % (ref 40–53)
HGB BLD-MCNC: 9.4 G/DL (ref 13.3–17.7)
IMM GRANULOCYTES # BLD: 0.2 10E9/L (ref 0–0.4)
IMM GRANULOCYTES NFR BLD: 1.7 %
INR PPP: 1.19 (ref 0.86–1.14)
LMWH PPP CHRO-ACNC: 0.16 IU/ML
LYMPHOCYTES # BLD AUTO: 1.2 10E9/L (ref 0.8–5.3)
LYMPHOCYTES NFR BLD AUTO: 11.7 %
MAGNESIUM SERPL-MCNC: 1.8 MG/DL (ref 1.6–2.3)
MCH RBC QN AUTO: 28.7 PG (ref 26.5–33)
MCHC RBC AUTO-ENTMCNC: 30.7 G/DL (ref 31.5–36.5)
MCV RBC AUTO: 93 FL (ref 78–100)
MONOCYTES # BLD AUTO: 1.1 10E9/L (ref 0–1.3)
MONOCYTES NFR BLD AUTO: 10.6 %
NEUTROPHILS # BLD AUTO: 6.8 10E9/L (ref 1.6–8.3)
NEUTROPHILS NFR BLD AUTO: 67.8 %
NRBC # BLD AUTO: 0 10*3/UL
NRBC BLD AUTO-RTO: 0 /100
PHOSPHATE SERPL-MCNC: 2.5 MG/DL (ref 2.5–4.5)
PLATELET # BLD AUTO: 185 10E9/L (ref 150–450)
POTASSIUM SERPL-SCNC: 4.5 MMOL/L (ref 3.4–5.3)
PROT SERPL-MCNC: 6.8 G/DL (ref 6.8–8.8)
RBC # BLD AUTO: 3.28 10E12/L (ref 4.4–5.9)
SODIUM SERPL-SCNC: 133 MMOL/L (ref 133–144)
WBC # BLD AUTO: 10 10E9/L (ref 4–11)

## 2020-05-13 PROCEDURE — 00000146 ZZHCL STATISTIC GLUCOSE BY METER IP

## 2020-05-13 PROCEDURE — 25000128 H RX IP 250 OP 636: Performed by: PHYSICIAN ASSISTANT

## 2020-05-13 PROCEDURE — 85520 HEPARIN ASSAY: CPT | Performed by: PHYSICIAN ASSISTANT

## 2020-05-13 PROCEDURE — 25800030 ZZH RX IP 258 OP 636: Performed by: SURGERY

## 2020-05-13 PROCEDURE — 25000132 ZZH RX MED GY IP 250 OP 250 PS 637: Mod: GY | Performed by: STUDENT IN AN ORGANIZED HEALTH CARE EDUCATION/TRAINING PROGRAM

## 2020-05-13 PROCEDURE — 36592 COLLECT BLOOD FROM PICC: CPT | Performed by: PHYSICIAN ASSISTANT

## 2020-05-13 PROCEDURE — 85025 COMPLETE CBC W/AUTO DIFF WBC: CPT | Performed by: PHYSICIAN ASSISTANT

## 2020-05-13 PROCEDURE — 25000132 ZZH RX MED GY IP 250 OP 250 PS 637: Mod: GY

## 2020-05-13 PROCEDURE — 21400000 ZZH R&B CCU UMMC

## 2020-05-13 PROCEDURE — 80053 COMPREHEN METABOLIC PANEL: CPT | Performed by: PHYSICIAN ASSISTANT

## 2020-05-13 PROCEDURE — 25000125 ZZHC RX 250: Performed by: SURGERY

## 2020-05-13 PROCEDURE — 97530 THERAPEUTIC ACTIVITIES: CPT | Mod: GO

## 2020-05-13 PROCEDURE — 97116 GAIT TRAINING THERAPY: CPT | Mod: GP

## 2020-05-13 PROCEDURE — 84100 ASSAY OF PHOSPHORUS: CPT | Performed by: PHYSICIAN ASSISTANT

## 2020-05-13 PROCEDURE — 25000132 ZZH RX MED GY IP 250 OP 250 PS 637: Mod: GY | Performed by: SURGERY

## 2020-05-13 PROCEDURE — 85610 PROTHROMBIN TIME: CPT | Performed by: PHYSICIAN ASSISTANT

## 2020-05-13 PROCEDURE — 25000132 ZZH RX MED GY IP 250 OP 250 PS 637: Mod: GY | Performed by: PHYSICIAN ASSISTANT

## 2020-05-13 PROCEDURE — 99233 SBSQ HOSP IP/OBS HIGH 50: CPT | Mod: 24 | Performed by: INTERNAL MEDICINE

## 2020-05-13 PROCEDURE — 83735 ASSAY OF MAGNESIUM: CPT | Performed by: PHYSICIAN ASSISTANT

## 2020-05-13 PROCEDURE — 25000128 H RX IP 250 OP 636: Performed by: SURGERY

## 2020-05-13 PROCEDURE — 97530 THERAPEUTIC ACTIVITIES: CPT | Mod: GP

## 2020-05-13 PROCEDURE — 97110 THERAPEUTIC EXERCISES: CPT | Mod: GO

## 2020-05-13 PROCEDURE — 97535 SELF CARE MNGMENT TRAINING: CPT | Mod: GO

## 2020-05-13 RX ORDER — LOSARTAN POTASSIUM 25 MG/1
25 TABLET ORAL DAILY
Status: DISCONTINUED | OUTPATIENT
Start: 2020-05-13 | End: 2020-05-14

## 2020-05-13 RX ORDER — FUROSEMIDE 10 MG/ML
40 INJECTION INTRAMUSCULAR; INTRAVENOUS ONCE
Status: COMPLETED | OUTPATIENT
Start: 2020-05-13 | End: 2020-05-13

## 2020-05-13 RX ORDER — CARVEDILOL 6.25 MG/1
6.25 TABLET ORAL 2 TIMES DAILY WITH MEALS
Status: DISCONTINUED | OUTPATIENT
Start: 2020-05-13 | End: 2020-05-19 | Stop reason: HOSPADM

## 2020-05-13 RX ORDER — WARFARIN SODIUM 5 MG/1
5 TABLET ORAL
Status: COMPLETED | OUTPATIENT
Start: 2020-05-13 | End: 2020-05-13

## 2020-05-13 RX ORDER — POTASSIUM CHLORIDE 750 MG/1
20 TABLET, EXTENDED RELEASE ORAL ONCE
Status: COMPLETED | OUTPATIENT
Start: 2020-05-13 | End: 2020-05-13

## 2020-05-13 RX ADMIN — FUROSEMIDE 40 MG: 10 INJECTION, SOLUTION INTRAMUSCULAR; INTRAVENOUS at 14:29

## 2020-05-13 RX ADMIN — HEPARIN SODIUM 1650 UNITS/HR: 10000 INJECTION, SOLUTION INTRAVENOUS at 11:20

## 2020-05-13 RX ADMIN — MAGNESIUM SULFATE IN WATER 2 G: 40 INJECTION, SOLUTION INTRAVENOUS at 08:20

## 2020-05-13 RX ADMIN — ATORVASTATIN CALCIUM 40 MG: 40 TABLET, FILM COATED ORAL at 20:01

## 2020-05-13 RX ADMIN — FUROSEMIDE 40 MG: 10 INJECTION, SOLUTION INTRAMUSCULAR; INTRAVENOUS at 08:32

## 2020-05-13 RX ADMIN — CARVEDILOL 3.12 MG: 3.12 TABLET, FILM COATED ORAL at 08:20

## 2020-05-13 RX ADMIN — ASPIRIN 81 MG: 81 TABLET, COATED ORAL at 08:32

## 2020-05-13 RX ADMIN — CARVEDILOL 6.25 MG: 6.25 TABLET, FILM COATED ORAL at 18:17

## 2020-05-13 RX ADMIN — Medication 1 MG: at 23:58

## 2020-05-13 RX ADMIN — DIGOXIN 125 MCG: 0.06 TABLET ORAL at 08:20

## 2020-05-13 RX ADMIN — WARFARIN SODIUM 5 MG: 5 TABLET ORAL at 18:17

## 2020-05-13 RX ADMIN — SODIUM PHOSPHATE, MONOBASIC, MONOHYDRATE AND SODIUM PHOSPHATE, DIBASIC, ANHYDROUS 10 MMOL: 276; 142 INJECTION, SOLUTION INTRAVENOUS at 23:53

## 2020-05-13 RX ADMIN — POTASSIUM CHLORIDE 20 MEQ: 750 TABLET, EXTENDED RELEASE ORAL at 08:32

## 2020-05-13 RX ADMIN — PANTOPRAZOLE SODIUM 40 MG: 40 TABLET, DELAYED RELEASE ORAL at 08:20

## 2020-05-13 RX ADMIN — POTASSIUM CHLORIDE 20 MEQ: 750 TABLET, EXTENDED RELEASE ORAL at 14:29

## 2020-05-13 RX ADMIN — LOSARTAN POTASSIUM 25 MG: 25 TABLET, FILM COATED ORAL at 08:32

## 2020-05-13 ASSESSMENT — ACTIVITIES OF DAILY LIVING (ADL)
ADLS_ACUITY_SCORE: 14

## 2020-05-13 ASSESSMENT — MIFFLIN-ST. JEOR: SCORE: 2073.99

## 2020-05-13 NOTE — PLAN OF CARE
Shift: [id number]    Status: post op for 3 vessel CABG  Neuro: Alert and oriented x4, able to make needs known. Pt seems very withdrawn and down. Calls very frequently   Cardiac/VS: VSS ex A fib   Respiratory: SpO2 90-92 on RA  GI: Denies N/V, incontinent of stool x1  Diet/Appetite: 2g Na diet  : Voids per urinal, intermittently incontinent   Activity: Assist of 1-2 with walker up to bedside commode x2  Pain: Denies pain  Skin: No new deficits, incisions C/D/I  LDA(s): R PIV with heparin at 1650 units/hour, double lumen PICC locked x2      Plan: Continue plan of care

## 2020-05-13 NOTE — PLAN OF CARE
Discharge Planner PT / 6C   Patient plan for discharge: Pt hopes to discharge to brother's home with assist and OP CR.  Current status: Pt needing cues to maintain sternal precautions with bed mobility and transfers. Pt completes sit > supine with bedrail and [place]. Pt transfers sit <> stand with [place]. Pt ambulates ~100ft 4x with FWW and CGA, seated rest breaks needed after each bout 2/2 fatigue/SOB. Pt with slow gait speed, slightly forward posture, no overt LOB. Pt ascends/descends 3 steps 3x - initial 2 reps with B HR and CGA, last rep with 1 HR and CGA. Stairs fatiguing for pt to complete, prolonged rest break needed after completion. VSS on RA.   Barriers to return to prior living situation: Medical status, sternal precautions, decreased strength / activity tolerance, impaired standing balance, stairs to enter/within brother's home.  Recommendations for discharge: ARU.  Rationale for recommendations: Pt below functional baseline and requires assist to maintain sternal precautions with mobility. Pt would benefit from continued skilled intensive rehab services to maximize functional independence and facilitate safe discharge home. Anticipate pt would tolerate 3hrs of therapy per day.

## 2020-05-13 NOTE — PROGRESS NOTES
Cardiovascular Surgery Progress Note  5/13/2020         Assessment and Plan:     Rudi Schilling is a 70 year old male with a PMH of morbid obesity, recently diagnosed atrial fibrillation, multivessel CAD, and biventricular HFrEF (EF 10% on 4/21/2020 TTE) who was transferred from an OSH ([place], [place]) on 5/5/2020 for further management and evaluation for possible revascularization. Thallium study from [place] showed viability in all 3 territories.   Upon arrival, patient was noted to be in A. fib with RVR with heart rates in the 140s volume overload 2+ lower extremity edema, and labs suggestive of YAHIR, congestive hepatopathy and hyperkalemia.  Patient was started on IV diuretics, heparin drip, and digoxin for rate control (Eliquis and metoprolol were held at that time).  Patient was continued on IV diuresis and medical optimization with up titration of beta-blockers and afterload reduction.  YAHIR, congestive hepatopathy and hyperkalemia resolved with continued diuresis. He had preliminary limited workup for LVAD placement.   He is now S/P CABG with bilateral pulmonary vein isolation and TORIBIO ligation 5/8/2020 with Dr Holloway.        Cardiovascular:   Severe biventricular HFrEF, NYHA Class II, CHF Stage D  ICM with multivessel CAD, now S/P 3 v CABG  HTN  - Coreg 3.125 mg BID (PTA 6.25 mg). Aspirin 81 mg daily. Atorvastatin daily  - Most recent echo (May 6) showed LV EF 30-35% with moderate RV dysfunction  Atrial Fibrillation   - Pre-op diagnosis, now s/p TORIBIO ligation and pulm vein isolation   - Amiodarone 400 mg PO and digoxin 125 mg daily   - CHADSVASC of 3 (age, vascular disease, and CHF).    - Anticoagulation with coumadin for 3 months before restarting Eliquis, bridging with Hep gtt (PTA was treat with Eliquis).  Most recent INR 1.19    Hypervolemia  - Lasix 40 mg IV BID with good UOP (some unrecorded UOP at times).       Pulmonary:   - Extubated POD 1. Now stable on RA. Continue Pulm toilet, IS, activity  and deep breathing  - Supplemental O2 PRN only to keep sats > 92%.     Neurology / MSK:  - Hx chronic back pain but is stable and controlled without PTA baclofen.   - Neuro intact; slight L sided facial droop that he says is normal for him  - Acute post-operative pain; pain well controlled with acetaminophen, PO oxycodone PRN      / Renal:  - No Hx of renal disease. Most recent creatinine WNL, adequate UOP on diuretics.      GI / FEN:   GERD  - 2 gram sodium diet, continue bowel regimen.   - Replace electrolytes as needed, hepatic enzymes WNL.      Endocrine:  - Stress induced hyperglycemia initially managed on insulin drip postop, transitioned to sliding scale goal BG <180.     Infectious Disease:  Positive BCx w/ staph hominis, suspected contamination  Pre-operative Fevers - resolved  - No further + blood cultures at our facility  - Completed perioperative antibiotics. No current need for ABx, monitoring daily  - WBC 10.0, remains afebrile, no signs or symptoms of infection     Hematology:   - Stable aute blood loss anemia; Hgb 9.4  - Plt WNL, no signs or symptoms of active bleeding     Prophylaxis:   - Stress ulcer prophylaxis: Pantoprazole 40 mg daily for 30 days  - DVT prophylaxis: SCD     Disposition:   - Transferred to  on 5/9   - Rehab currently recommending discharge to TCU but potentially home with assist   - [facility]-2 weeks, [phone].    - Await therapeutic INR. Coumadin for 3 months (goal 2-3) for A-fib before restarting Eliquis        Discussed with CVTS Fellow as needed.  Staff surgeons have been informed of changes through both verbal and written communication.      Rodríguez Soriano PA-C  Cardiothoracic Surgery  Pager [phone]    8:18 AM   May 13, 2020          Interval History:     No overnight events.   States pain is well managed on current regimen. Slept well overnight.  Tolerating diet and hungry at times, is passing flatus,  "+ BM. No nausea or vomiting.  Breathing well without complaints, working on IS and flutter valve.   Working with therapies and ambulating in halls without assistance.   Denies chest pain, palpitations, dizziness, syncopal symptoms, fevers, chills, myalgias, or sternal popping/clicking.         Physical Exam:   Blood pressure (!) 150/82, pulse 98, temperature 98.7  F (37.1  C), temperature source Oral, resp. rate 20, height 1.778 m (5' 10\"), weight 129.8 kg (286 lb 1.6 oz), SpO2 91 %.  Vitals:    05/12/20 0105 05/12/20 0900 05/13/20 0456   Weight: 111.5 kg (245 lb 14.4 oz) 130.6 kg (288 lb) 129.8 kg (286 lb 1.6 oz)      Weight; + 0 kg since admit and trending down x 1 day   24 hr Fluid status; net loss 300 mL (plus some unmeasured).   MAPs: 95 - 99    Gen: A&Ox4, NAD  Neuro: no focal deficits   CV: irregular, tachy, normal S1 S2, no murmurs, rubs or gallops. + JVD  Pulm: CTA, no wheezing or rhonchi, normal breathing on RA  Abd: obese, nondistended, normal BS, soft, nontender  Ext: Left 2+, Right 1+ pitting edema of legs.   Incision: clean, dry, intact, no erythema, sternum stable  Tubes/drain sites: dressing clean and dry         Data:    Imaging:  reviewed recent imaging, no acute concerns      Labs:  BMP  Recent Labs   Lab 05/13/20  0618 05/12/20  1321 05/11/20  0453 05/10/20  1023    133 136 135   POTASSIUM 4.5 4.4 4.5 4.4   CHLORIDE 100 99 101 102   SIDRA 8.6 8.6 8.8 8.9   CO2 28 30 30 28   BUN 24 26 25 24   CR 0.81 0.81 0.82 0.90   * 112* 119* 161*     CBC  Recent Labs   Lab 05/13/20  0618 05/11/20  0453 05/10/20  1541 05/10/20  1023   WBC 10.0 13.0* 14.5* 14.0*   RBC 3.28* 3.43* 3.55* 3.69*   HGB 9.4* 9.8* 10.1* 10.5*   HCT 30.6* 33.0* 34.0* 35.6*   MCV 93 96 96 97   MCH 28.7 28.6 28.5 28.5   MCHC 30.7* 29.7* 29.7* 29.5*   RDW 14.4 14.6 14.5 14.5    188 176 175     INR  Recent Labs   Lab 05/13/20  0618 05/12/20  0537 05/11/20  0831 05/08/20  1528   INR 1.19* 1.17* 1.17* 1.41*      Liver " Function Studies -   Recent Labs   Lab Test 05/13/20 0618   PROTTOTAL 6.8   ALBUMIN 2.6*   BILITOTAL 0.7   ALKPHOS 42   AST 30   ALT 25     GLUCOSE:   Recent Labs   Lab 05/13/20  0805 05/13/20  0618 05/13/20  0411 05/12/20  2132 05/12/20  1701 05/12/20  1321 05/12/20  1230 05/12/20  0815  05/11/20  0453  05/10/20  1023  05/09/20  0413  05/08/20  1528   GLC  --  120*  --   --   --  112*  --   --   --  119*  --  161*  --  147*  --  190*   *  --  119* 138* 106*  --  116* 114*   < >  --    < >  --    < >  --    < >  --     < > = values in this interval not displayed.

## 2020-05-13 NOTE — PLAN OF CARE
OT 6C:  Discharge Planner OT   Patient plan for discharge: to sister-in-law's house  Current status: Pt greeted up in chair.  Pt recalling some aspects of sternal precautions, educated on implications for ADL.  Pt needing mod A to stand from chair and cues/A for sternal precautions.  Pt ambulates ~75ft with CGA FWW, VSS on RA.  Barriers to return to prior living situation: medical, sternal precautions, level of A for ADL and mobility, stairs  Recommendations for discharge: TCU, however pending continued progress in therapy pt may be safe to discharge home with assist and HH vs OP therapy  Rationale for recommendations: Pt progressing with functional mobility but still needing significant A for ADL and cues for precautions.  Will benefit from continued skilled OT to maximize safety and I with ADL       Entered by: Starr Ha 05/13/2020 4:39 PM

## 2020-05-13 NOTE — PLAN OF CARE
"/73 (BP Location: Left arm)   Pulse 98   Temp 98.6  F (37  C) (Oral)   Resp 18   Ht 1.778 m (5' 10\")   Wt 129.8 kg (286 lb 1.6 oz)   SpO2 94%   BMI 41.05 kg/m       Neuro: A/Ox4  Cardiac: VSS. A fib with HR . Afebrile  Respiratory: On RA. No respiratory distress noted.   GI/: Voiding frequently without difficulty. IV lasix administered x2. BMx2  Diet/Appetite: 2g Na diet, fair appetite. Denies nausea  Skin: No new skin deficits noted. Mepilex in place on coccyx. Chest dressing CDI  LDA: DL PICC saline locked. PIV infusing hep gtt at 1650units/hr.   Activity: Up with assist of 1 with GB+walker.   Pain: Denies   Plan: Continue to monitor and follow POC. Notify MD with any changes or concerns    "

## 2020-05-13 NOTE — PLAN OF CARE
A/incisions are healing, says he may not eat supper tonight.-but he ate everything on tray! Talking to family on the phone. Denies pain.  I/reminded to do the I/S, was up in chair for several hours, I called sister for him twice. He forgot we called earlier. I heard him talking to her both times before I left the room. Did not call q 2-5 on call light minutes tonight  P/monitor for changes

## 2020-05-14 ENCOUNTER — APPOINTMENT (OUTPATIENT)
Dept: PHYSICAL THERAPY | Facility: CLINIC | Age: 70
DRG: 234 | End: 2020-05-14
Attending: THORACIC SURGERY (CARDIOTHORACIC VASCULAR SURGERY)
Payer: MEDICARE

## 2020-05-14 ENCOUNTER — APPOINTMENT (OUTPATIENT)
Dept: OCCUPATIONAL THERAPY | Facility: CLINIC | Age: 70
DRG: 234 | End: 2020-05-14
Attending: THORACIC SURGERY (CARDIOTHORACIC VASCULAR SURGERY)
Payer: MEDICARE

## 2020-05-14 LAB
ANION GAP SERPL CALCULATED.3IONS-SCNC: 7 MMOL/L (ref 3–14)
BUN SERPL-MCNC: 22 MG/DL (ref 7–30)
CALCIUM SERPL-MCNC: 8.6 MG/DL (ref 8.5–10.1)
CHLORIDE SERPL-SCNC: 99 MMOL/L (ref 94–109)
CO2 SERPL-SCNC: 28 MMOL/L (ref 20–32)
CREAT SERPL-MCNC: 0.77 MG/DL (ref 0.66–1.25)
GFR SERPL CREATININE-BSD FRML MDRD: >90 ML/MIN/{1.73_M2}
GLUCOSE SERPL-MCNC: 148 MG/DL (ref 70–99)
INR PPP: 1.25 (ref 0.86–1.14)
LMWH PPP CHRO-ACNC: 0.13 IU/ML
LMWH PPP CHRO-ACNC: 0.28 IU/ML
MAGNESIUM SERPL-MCNC: 1.9 MG/DL (ref 1.6–2.3)
PHOSPHATE SERPL-MCNC: 3 MG/DL (ref 2.5–4.5)
POTASSIUM SERPL-SCNC: 4.2 MMOL/L (ref 3.4–5.3)
SODIUM SERPL-SCNC: 134 MMOL/L (ref 133–144)

## 2020-05-14 PROCEDURE — 83735 ASSAY OF MAGNESIUM: CPT | Performed by: PHYSICIAN ASSISTANT

## 2020-05-14 PROCEDURE — 80048 BASIC METABOLIC PNL TOTAL CA: CPT | Performed by: PHYSICIAN ASSISTANT

## 2020-05-14 PROCEDURE — 25000132 ZZH RX MED GY IP 250 OP 250 PS 637: Mod: GY | Performed by: PHYSICIAN ASSISTANT

## 2020-05-14 PROCEDURE — 97116 GAIT TRAINING THERAPY: CPT | Mod: GP

## 2020-05-14 PROCEDURE — 25000128 H RX IP 250 OP 636: Performed by: PHYSICIAN ASSISTANT

## 2020-05-14 PROCEDURE — 25000132 ZZH RX MED GY IP 250 OP 250 PS 637: Mod: GY | Performed by: SURGERY

## 2020-05-14 PROCEDURE — 36592 COLLECT BLOOD FROM PICC: CPT | Performed by: THORACIC SURGERY (CARDIOTHORACIC VASCULAR SURGERY)

## 2020-05-14 PROCEDURE — 85610 PROTHROMBIN TIME: CPT | Performed by: PHYSICIAN ASSISTANT

## 2020-05-14 PROCEDURE — 99233 SBSQ HOSP IP/OBS HIGH 50: CPT | Mod: 24 | Performed by: INTERNAL MEDICINE

## 2020-05-14 PROCEDURE — 25000132 ZZH RX MED GY IP 250 OP 250 PS 637: Mod: GY

## 2020-05-14 PROCEDURE — 25000132 ZZH RX MED GY IP 250 OP 250 PS 637: Mod: GY | Performed by: STUDENT IN AN ORGANIZED HEALTH CARE EDUCATION/TRAINING PROGRAM

## 2020-05-14 PROCEDURE — 85520 HEPARIN ASSAY: CPT | Performed by: THORACIC SURGERY (CARDIOTHORACIC VASCULAR SURGERY)

## 2020-05-14 PROCEDURE — 97535 SELF CARE MNGMENT TRAINING: CPT | Mod: GO | Performed by: OCCUPATIONAL THERAPIST

## 2020-05-14 PROCEDURE — 25000128 H RX IP 250 OP 636: Performed by: SURGERY

## 2020-05-14 PROCEDURE — 97530 THERAPEUTIC ACTIVITIES: CPT | Mod: GP

## 2020-05-14 PROCEDURE — 97110 THERAPEUTIC EXERCISES: CPT | Mod: GO | Performed by: OCCUPATIONAL THERAPIST

## 2020-05-14 PROCEDURE — 21400000 ZZH R&B CCU UMMC

## 2020-05-14 PROCEDURE — 84100 ASSAY OF PHOSPHORUS: CPT | Performed by: PHYSICIAN ASSISTANT

## 2020-05-14 PROCEDURE — 36415 COLL VENOUS BLD VENIPUNCTURE: CPT | Performed by: PHYSICIAN ASSISTANT

## 2020-05-14 PROCEDURE — 85520 HEPARIN ASSAY: CPT | Performed by: PHYSICIAN ASSISTANT

## 2020-05-14 RX ORDER — LOPERAMIDE HCL 2 MG
2 CAPSULE ORAL 3 TIMES DAILY PRN
Status: DISCONTINUED | OUTPATIENT
Start: 2020-05-14 | End: 2020-05-19 | Stop reason: HOSPADM

## 2020-05-14 RX ORDER — FUROSEMIDE 10 MG/ML
40 INJECTION INTRAMUSCULAR; INTRAVENOUS ONCE
Status: COMPLETED | OUTPATIENT
Start: 2020-05-14 | End: 2020-05-14

## 2020-05-14 RX ORDER — LOSARTAN POTASSIUM 25 MG/1
25 TABLET ORAL ONCE
Status: COMPLETED | OUTPATIENT
Start: 2020-05-14 | End: 2020-05-14

## 2020-05-14 RX ORDER — FUROSEMIDE 10 MG/ML
20 INJECTION INTRAMUSCULAR; INTRAVENOUS ONCE
Status: DISCONTINUED | OUTPATIENT
Start: 2020-05-14 | End: 2020-05-14

## 2020-05-14 RX ORDER — CEFAZOLIN SODIUM 2 G/100ML
2 INJECTION, SOLUTION INTRAVENOUS EVERY 8 HOURS
Status: DISCONTINUED | OUTPATIENT
Start: 2020-05-14 | End: 2020-05-16

## 2020-05-14 RX ORDER — POTASSIUM CHLORIDE 750 MG/1
20 TABLET, EXTENDED RELEASE ORAL ONCE
Status: COMPLETED | OUTPATIENT
Start: 2020-05-14 | End: 2020-05-14

## 2020-05-14 RX ORDER — ACETAMINOPHEN 325 MG/1
650 TABLET ORAL EVERY 6 HOURS PRN
Status: DISCONTINUED | OUTPATIENT
Start: 2020-05-14 | End: 2020-05-19 | Stop reason: HOSPADM

## 2020-05-14 RX ORDER — LOSARTAN POTASSIUM 25 MG/1
50 TABLET ORAL DAILY
Status: DISCONTINUED | OUTPATIENT
Start: 2020-05-15 | End: 2020-05-15

## 2020-05-14 RX ORDER — WARFARIN SODIUM 5 MG/1
5 TABLET ORAL
Status: COMPLETED | OUTPATIENT
Start: 2020-05-14 | End: 2020-05-14

## 2020-05-14 RX ADMIN — Medication 1 MG: at 23:11

## 2020-05-14 RX ADMIN — FUROSEMIDE 40 MG: 10 INJECTION, SOLUTION INTRAMUSCULAR; INTRAVENOUS at 09:49

## 2020-05-14 RX ADMIN — LOSARTAN POTASSIUM 25 MG: 25 TABLET, FILM COATED ORAL at 08:18

## 2020-05-14 RX ADMIN — ATORVASTATIN CALCIUM 40 MG: 40 TABLET, FILM COATED ORAL at 20:40

## 2020-05-14 RX ADMIN — MAGNESIUM SULFATE IN WATER 2 G: 40 INJECTION, SOLUTION INTRAVENOUS at 08:18

## 2020-05-14 RX ADMIN — FUROSEMIDE 40 MG: 10 INJECTION, SOLUTION INTRAMUSCULAR; INTRAVENOUS at 15:54

## 2020-05-14 RX ADMIN — PANTOPRAZOLE SODIUM 40 MG: 40 TABLET, DELAYED RELEASE ORAL at 08:18

## 2020-05-14 RX ADMIN — POTASSIUM CHLORIDE 20 MEQ: 750 TABLET, EXTENDED RELEASE ORAL at 15:54

## 2020-05-14 RX ADMIN — POTASSIUM CHLORIDE 20 MEQ: 750 TABLET, EXTENDED RELEASE ORAL at 09:49

## 2020-05-14 RX ADMIN — LOSARTAN POTASSIUM 25 MG: 25 TABLET, FILM COATED ORAL at 09:48

## 2020-05-14 RX ADMIN — CARVEDILOL 6.25 MG: 6.25 TABLET, FILM COATED ORAL at 18:25

## 2020-05-14 RX ADMIN — ACETAMINOPHEN 650 MG: 325 TABLET, FILM COATED ORAL at 14:08

## 2020-05-14 RX ADMIN — CARVEDILOL 6.25 MG: 6.25 TABLET, FILM COATED ORAL at 08:18

## 2020-05-14 RX ADMIN — HEPARIN SODIUM 1650 UNITS/HR: 10000 INJECTION, SOLUTION INTRAVENOUS at 03:42

## 2020-05-14 RX ADMIN — CEFAZOLIN SODIUM 2 G: 2 INJECTION, SOLUTION INTRAVENOUS at 11:40

## 2020-05-14 RX ADMIN — HEPARIN SODIUM 1800 UNITS/HR: 10000 INJECTION, SOLUTION INTRAVENOUS at 08:19

## 2020-05-14 RX ADMIN — DIGOXIN 125 MCG: 0.06 TABLET ORAL at 08:18

## 2020-05-14 RX ADMIN — ACETAMINOPHEN 650 MG: 325 TABLET, FILM COATED ORAL at 08:18

## 2020-05-14 RX ADMIN — ASPIRIN 81 MG: 81 TABLET, COATED ORAL at 08:18

## 2020-05-14 RX ADMIN — WARFARIN SODIUM 5 MG: 5 TABLET ORAL at 18:25

## 2020-05-14 RX ADMIN — CEFAZOLIN SODIUM 2 G: 2 INJECTION, SOLUTION INTRAVENOUS at 20:39

## 2020-05-14 ASSESSMENT — ACTIVITIES OF DAILY LIVING (ADL)
ADLS_ACUITY_SCORE: 19
ADLS_ACUITY_SCORE: 18
ADLS_ACUITY_SCORE: 19
ADLS_ACUITY_SCORE: 19
ADLS_ACUITY_SCORE: 14
ADLS_ACUITY_SCORE: 18

## 2020-05-14 ASSESSMENT — PAIN DESCRIPTION - DESCRIPTORS
DESCRIPTORS: ACHING;SORE
DESCRIPTORS: ACHING;SORE

## 2020-05-14 ASSESSMENT — MIFFLIN-ST. JEOR: SCORE: 2066.28

## 2020-05-14 NOTE — PROGRESS NOTES
Cardiology Progress Note  5/6/2020      ASSESSMENT/PLAN:  Rudi Schilling is a 70 year old male with a PMH significant for morbid obesity and recently diagnosed atrial fibrillation, multivessel CAD and biventricular HFrEF (EF 10% on 4/21/2020 TTE) who was transferred from an OSH on 5/5/2020 for further management and evaluation for possible revascularization. S/P CABG 5/8/2020    Changes Today:  - Transition to PO lasix tomorrow, received IV lasix today   - Increase losartan to 50mg po daily     # Severe biventricular HFrEF, NYHA CLASS II, CHF STAGE D  # ICM with multivessel CAD  # S/P CABG 5/8/2020 LIMA to LAD, SVG to OM, SVG to right PDA)/bilateral pulmonary vein isolation/TORIBIO ligation   Most recent echo on 4/21/2020 showed EF of 10%, and coronary angiogram on 4/28/2020 revealed multivessel CAD.  Transferred to [facility] for further management and evaluation for revascularization. Patient denies any complaints at this time. He reports mild symptoms of SOB with exertion but denies chest pain or palpitations. He appears euvolemic on exam. TTE from 5/6 showed moderate diffuse hypokinesis LVEF 30-35%.   - ACEI/ARB: increase losartan to 50mg po daily   - Beta Blocker: increase coreg to 6.25mg po BID, recommended uptitrating as tolerated   - Aldosterone Antagonist: will restart spironolactone 25mg po daily as tolerated   - Diuresis: Received IV lasix 40mg x2, plan to transition to PO diuretic tomorrow   - Telemetry   - Strict Is/Os, daily weights, cardiac diet, fluid restriction (<1.5L daily)     # atrial fibrillation  CHADSVASC of 3 (age, vascular disease, and CHF).  Rates of 90s on admission.  - resume coreg 6.25mg po BID, continue digoxin 125mcg po daily   - heparin gtt for anticoagulation, resumption of PO anticoagulation per primary team      CHRONIC MEDICAL PROBLEMS  # Chronic back pain: continue PTA cyclobenzaprine PRN  # GERD: continue PTA pantoprazole    FEN:Cardiac Diet  Ppx: Heparin gtt  Code: Full    Plan d/w   "TSERING Newby, who is in agreement. Please, see staff addendum for changes/additions to the plan.    Radha Lamosiris  Cardiology PGY4    ===================================================================    SUBJECTIVE:   No reported issues overnight   Reported doing well today     OBJECTIVE:  /70 (BP Location: Left arm)   Pulse 98   Temp 98.3  F (36.8  C) (Oral)   Resp 18   Ht 1.778 m (5' 10\")   Wt 129 kg (284 lb 6.4 oz)   SpO2 91%   BMI 40.81 kg/m    Temp (24hrs), Av.6  F (37  C), Min:98.2  F (36.8  C), Max:98.9  F (37.2  C)    Wt:   Wt Readings from Last 5 Encounters:   20 129 kg (284 lb 6.4 oz)     GEN: pleasant, no acute distress  HEENT: no icterus  CV: RRR, normal s1/s2, no murmurs/rubs/s3/s4, no heave. Flat JVD while seated upright   CHEST: clear to ausculation bilaterally, no rales or wheezing  ABD: soft, obese, non-tender, normal active bowel sounds  EXTR: Trace edema bilaterally  NEURO: alert oriented, speech fluent/appropriate, motor grossly nonfocal    Labs: reviewed in EMR  CBC  Recent Labs   Lab 20  0618 20  0453 05/10/20  1541 05/10/20  1023   WBC 10.0 13.0* 14.5* 14.0*   RBC 3.28* 3.43* 3.55* 3.69*   HGB 9.4* 9.8* 10.1* 10.5*   HCT 30.6* 33.0* 34.0* 35.6*   MCV 93 96 96 97   MCH 28.7 28.6 28.5 28.5   MCHC 30.7* 29.7* 29.7* 29.5*   RDW 14.4 14.6 14.5 14.5    188 176 175     BMP  Recent Labs   Lab 20  0606 20  0618 20  1321 20  0453  20  0413  20  1528    133 133 136   < > 141  --  140   POTASSIUM 4.2 4.5 4.4 4.5   < > 4.7   < > 4.6   CHLORIDE 99 100 99 101   < > 108  --  106   CO2 28 28 30 30   < > 27  --  27   ANIONGAP 7 5 4 5   < > 6  --  8   * 120* 112* 119*   < > 147*  --  190*   BUN 22 24 26 25   < > 20  --  20   CR 0.77 0.81 0.81 0.82   < > 0.96  --  1.04   GFRESTIMATED >90 >90 >90 >90   < > 81  --  73   GFRESTBLACK >90 >90 >90 >90   < > >90  --  85   SIDRA 8.6 8.6 8.6 8.8   < > 8.6  --  8.6   MAG 1.9 1.8  --  "  --   --  2.5*  --  2.7*   PHOS 3.0 2.5  --   --   --  4.2  --  3.1    < > = values in this interval not displayed.     Troponins:   No results found for: TROPI, TROPONIN, TROPR, TROPN  INR  Recent Labs   Lab 05/14/20  0606 05/13/20  0618 05/12/20  0537 05/11/20  0831   INR 1.25* 1.19* 1.17* 1.17*       Imaging: reviewed in EMR.    I have reviewed today's vital signs, notes, medications, labs and imaging.  I have also seen and examined the patient and agree with the findings and plan as outlined above.  Pt with VSS and no complaints.  Lungs clear and S1 and S2 irreg irreg.  Labs with Cr 0.8 and WBC 10.  Assessment: Pt with ischemic DCM with surgical revascularization.  Transition to po diuretics, increase cozaar and coreg.     Tapan Newby MD, PhD  Professor, Heart Failure and Cardiac Transplantation  [facility]

## 2020-05-14 NOTE — PLAN OF CARE
Discharge Planner PT / 6C   Patient plan for discharge: Home with assist and OP CR.  Current status: Pt needing cues to maintain sternal precautions with bed mobility and transfers. Pt completes supine > sit with HOB slightly elevated and CGA. Pt transfers sit <> stand with CGA-Jean [place]. Pt ambulates ~100ft, ~175ft 2x, and ~100ft with FWW and CGA, seated rest breaks needed after each bout 2/2 fatigue/SOB. Pt ascends/descends 3 steps with 1 HR and CGA. Pt unable to tolerate further reps on stairs this date 2/2 fatigue. VSS on RA throughout.   Barriers to return to prior living situation: Medical status, sternal precautions, decreased strength / activity tolerance, impaired standing balance, stairs to enter/within home.  Recommendations for discharge: Rehab - though pt declining.  Rationale for recommendations: Pt below functional baseline and requires assist to maintain sternal precautions with mobility. Pt would benefit from continued skilled intensive rehab services to maximize functional independence and facilitate safe discharge home. However pt declining rehab stay - thus recommend home with 24-hour assist/supervision, HH PT/OT, and FWW.

## 2020-05-14 NOTE — PLAN OF CARE
D: Pt admitted from OSH for evaluation for revascularization, now s/p CABG x5 with bilateral pulmonary vein isolation and TORIBIO ligation 5/8. Hx of morbid obesity, recently diagnosed afib, multivessel CAD, and biventricular heart failure     I/A: Vital signs stable, afebrile, A&Ox4, afib 90-110s. Denied pain overnight. Heparin gtt continues at 1650 units/hr, 10A therapeutic. Phos of 2.5 replaced per protocol. Incisions and dressings CDI. Up with assist of 1 to commode, voiding adequate amounts. Multiple loose stools overnight. Slept off and on between cares, PRN melatonin given at HS.     P: Awaiting therapeutic INR for discharge. PT/OT rec TCU on discharge but pt refusing. Continue to monitor and notify MD with pertinent changes.

## 2020-05-14 NOTE — PROGRESS NOTES
Care Coordinator Progress Note    Admission Date/Time:  5/5/2020  Attending MD:  Yobany Holloway    Data  Chart reviewed, discussed with interdisciplinary team.   Patient was admitted for:    Coronary artery disease involving native coronary artery of native heart without angina pectoris  Coronary artery disease involving native coronary artery of native heart without angina pectoris  CAD (coronary artery disease).    Assessment  Full assessment completed in previous note:  Concerns with insurance coverage for discharge needs: None stated by pt.  Current Living Situation: Patient lives alone. Pt told this writer that he will be staying with his brother, Reuben Schilling (Cell: [phone]) and his sister-in-law in [place], [place] and they are available to assist pt after discharge.   Support System: Supportive and Involved brother and sister-in-law.   Services Involved: none currently.   Transportation at Discharge: Pt's brotherReuben will provide pt with a ride home upon discharge.   Transportation to Medical Appointments:  Name of caregiver: Reuben Schilling  Barriers to Discharge: post-op recovery.     Coordination of Care and Referrals: Provided patient/family with options for oupt INR and warfarin monitoring. Pt was scheduled for PCP follow up on 5/18, but per discussion with PA, this will need to be rescheduled until later in the week as pt not anticipated to discharge for a few days.    Intervention:   Arrangements made with the [facility] (Ph: [phone] Fax: [phone]) for INR and Warfarin monitoring (Goal=2-3). Indication for Anticoagulation: Atrial Fibrillation. Appointment changed to 5/20/2020 at 8:45am with Dr. Hima Ag for INR lab draw, Warfarin monitoring, post hospitalization follow up.       Plan  Anticipated Discharge Date: TBD  Anticipated Discharge Plan: Discharge to brother's home with outpt follow up  CC will continue to monitor patient's medical condition and  progress towards discharge.  Kate Woods RN BSN  6C Unit Care Coordinator  Phone number: [phone]  Pager: [phone]    Addendum 5/15/2020 at 1400:  This writer met with pt to follow up on recommendation for home PT/OT, pt agreeable and asked this writer to reach out to his brother, Reuben to confirm his home address and for preference on agency. This writer called and spoke with Reuben (Cell: [phone]) and discussed options for home care and education on Medicare.gov list with agency ratings. Reuben declined a list and stated that he would [facility] as his wife received home care services from the previously. Reuben's address is [address] [place] [address].  This writer spoke with Honey intake nurse at [facility] (Ph: [phone], F: [phone]) who confirmed that they would be able to provide home PT/OT. Initial referral packet sent and orders placed.

## 2020-05-14 NOTE — PLAN OF CARE
1500-1930D/He calls on call light q 5-15 minutes so far this evening. He was in the chair, and now is in bed.   A.incisions are healing. I questions how the family will care for him as he is refusing rehab. He was repeatedly incontinent of stool on previous shifts up to 24 hours ago, and declines to hold a urinal in place to void. He will hold a cup to take a drink of water now.  I/changed dressing over lower sternum and old CT sites with minimal drainage  P/monitor INR, encourage independence of tasks as appropriate, keep team updated.Next shift to continue cares

## 2020-05-14 NOTE — PROGRESS NOTES
Cardiology Progress Note  5/6/2020      ASSESSMENT/PLAN:  Rudi Schilling is a 70 year old male with a PMH significant for morbid obesity and recently diagnosed atrial fibrillation, multivessel CAD and biventricular HFrEF (EF 10% on 4/21/2020 TTE) who was transferred from an OSH on 5/5/2020 for further management and evaluation for possible revascularization. S/P CABG 5/8/2020    Changes Today:  - Give lasix 40mg IV   - Increase coreg to 6.25mg po daily  - Start losartan 25mg po daily     # Severe biventricular HFrEF, NYHA CLASS II, CHF STAGE D  # ICM with multivessel CAD  # S/P CABG 5/8/2020 LIMA to LAD, SVG to OM, SVG to right PDA)/bilateral pulmonary vein isolation/TORIBIO ligation   Most recent echo on 4/21/2020 showed EF of 10%, and coronary angiogram on 4/28/2020 revealed multivessel CAD.  Transferred to [facility] for further management and evaluation for revascularization. Patient denies any complaints at this time. He reports mild symptoms of SOB with exertion but denies chest pain or palpitations. He appears euvolemic on exam. TTE from 5/6 showed moderate diffuse hypokinesis LVEF 30-35%.   - ACEI/ARB: start losartan 25mg po daily, recommend up titration as tolerated   - Beta Blocker: increase coreg to 6.25mg po BID, recommended uptitrating as tolerated   - Aldosterone Antagonist: will restart spironolactone 25mg po daily as tolerated   - Diuresis: Appears hypervolemic, lasix 40mg IV x1, may need to redose   - Telemetry   - Strict Is/Os, daily weights, cardiac diet, fluid restriction (<1.5L daily)     # atrial fibrillation  CHADSVASC of 3 (age, vascular disease, and CHF).  Rates of 90s on admission.  - resume coreg 3.125mg po BID, continue digoxin 125mcg po daily   - discontinue amiodarone gtt   - stop PO amiodarone   - heparin gtt for anticoagulation, resumption of PO anticoagulation per primary team      CHRONIC MEDICAL PROBLEMS  # Chronic back pain: continue PTA cyclobenzaprine PRN  # GERD: continue PTA  "pantoprazole    FEN:Cardiac Diet  Ppx: Heparin gtt  Code: Full    Plan d/w Dr. Odin M.D., who is in agreement. Please, see staff addendum for changes/additions to the plan.    Radha Lamosiris  Cardiology PGY4    ===================================================================    SUBJECTIVE:   Patient reported feeling well today and able to participate with PT      OBJECTIVE:  BP (!) 112/93 (BP Location: Left arm)   Pulse 98   Temp 97.4  F (36.3  C) (Oral)   Resp 20   Ht 1.778 m (5' 10\")   Wt 129.8 kg (286 lb 1.6 oz)   SpO2 92%   BMI 41.05 kg/m    Temp (24hrs), Av.6  F (37  C), Min:98.2  F (36.8  C), Max:98.9  F (37.2  C)    Wt:   Wt Readings from Last 5 Encounters:   20 129.8 kg (286 lb 1.6 oz)     GEN: pleasant, no acute distress  HEENT: no icterus  CV: RRR, normal s1/s2, no murmurs/rubs/s3/s4, no heave. +JVD  CHEST: clear to ausculation bilaterally, no rales or wheezing  ABD: soft, obese, non-tender, normal active bowel sounds  EXTR: Trace edema bilaterally  NEURO: alert oriented, speech fluent/appropriate, motor grossly nonfocal    Labs: reviewed in EMR  CBC  Recent Labs   Lab 20  0618 20  0453 05/10/20  1541 05/10/20  1023   WBC 10.0 13.0* 14.5* 14.0*   RBC 3.28* 3.43* 3.55* 3.69*   HGB 9.4* 9.8* 10.1* 10.5*   HCT 30.6* 33.0* 34.0* 35.6*   MCV 93 96 96 97   MCH 28.7 28.6 28.5 28.5   MCHC 30.7* 29.7* 29.7* 29.5*   RDW 14.4 14.6 14.5 14.5    188 176 175     BMP  Recent Labs   Lab 20  0618 20  1321 20  0453 05/10/20  1023 20  0413  20  1528  20  0612    133 136 135 141  --  140   < > 138   POTASSIUM 4.5 4.4 4.5 4.4 4.7   < > 4.6   < > 4.4   CHLORIDE 100 99 101 102 108  --  106   < > 102   CO2 28 30 30 28 27  --  27   < > 29   ANIONGAP 5 4 5 5 6  --  8   < > 7   * 112* 119* 161* 147*  --  190*   < > 114*   BUN 24 26 25 24 20  --  20   < > 16   CR 0.81 0.81 0.82 0.90 0.96  --  1.04   < > 0.86   GFRESTIMATED >90 >90 >90 87 81  -- "  73   < > 89   GFRESTBLACK >90 >90 >90 >90 >90  --  85   < > >90   SIDRA 8.6 8.6 8.8 8.9 8.6  --  8.6   < > 9.3   MAG 1.8  --   --   --  2.5*  --  2.7*  --  2.1   PHOS 2.5  --   --   --  4.2  --  3.1  --  3.6    < > = values in this interval not displayed.     Troponins:   No results found for: TROPI, TROPONIN, TROPR, TROPN  INR  Recent Labs   Lab 05/13/20  0618 05/12/20  0537 05/11/20  0831 05/08/20  1528   INR 1.19* 1.17* 1.17* 1.41*       Imaging: reviewed in EMR.    I have reviewed today's vital signs, notes, medications, labs and imaging.  I have also seen and examined the patient and agree with the findings and plan as outlined above.  Pt without complaitns.  VSS with /82 and 1.4L UO. Lungs clear and S1 and S2.  Labs with cr 0.81, WBC 10.  Assessment: pt with surgical revascularization and ischemic DCM with need to increase coreg and ARB.     Tapan Newby MD, PhD  Professor, Heart Failure and Cardiac Transplantation  [facility]

## 2020-05-14 NOTE — PROGRESS NOTES
CLINICAL NUTRITION SERVICES - REASSESSMENT NOTE     Nutrition Prescription    RECOMMENDATIONS FOR MDs/PROVIDERS TO ORDER:  Consider multivitamin with minerals to help ensure micronutrient needs are met    Malnutrition Status:    Unable to determine due to unable to complete NFPA to limit exposure and to minimize use of PPE during COVID-19 pandemic     Recommendations already ordered by Registered Dietitian (RD):  Supplements: continue as ordered per pt's preference    Future/Additional Recommendations:  Monitor adequacy of PO intake. If suspect inadequate, recommend start kcal counts (pending anticipated LOS) and adjust supplements per pt's preference.      EVALUATION OF THE PROGRESS TOWARD GOALS   Diet:   2 g Sodium, Supplements: chocolate Boost Plus @ HS (may order additional with meals), Room Service with Assist    Intake:   Per flowsheets, pt consuming % of ~3 meals daily. Per RN notes, pt tolerating diet. Spoke with pt today via phone. Pt reports that his appetite is at baseline and he has been able to consume 3 meals daily, which is more than he typically consumes at home. He reports that he enjoys the chocolate Boost supplements and would like to keep receiving them.       NEW FINDINGS   Chart Review:   S/p CABG with bilateral pulmonary vein isolation and TORIBIO ligation 5/8/2020. Extubated POD#1. Now stable on RA. Transferred to floor on 5/9. Discharge pending therapeutic INR.     Labs:   Na+ 134 (WNL), K+ 4.2 (WNL), Mg++ 1.9 (WNL), Phos 3.0 (WNL)  Glucose Trends 148 <- 143 <- 109 <- 120 <- 119  LFTs WNL    Renal:   BUN 22 (WNL), Cr 0.77 (WNL)    Medications:   Lasix  Potassium chloride 20 mEq  Bowel regimen  Heparin gtt  Imodium TID PRN  Lyte (Mg++, K+, Phos) replacement PRN per high replacement protocol    Weight:   Throughout admission weight has fluctuated between 111.5 - 130.6 kg, cannot r/o shifts in fluid status, pt on lasix. Current weight is overall down 1 kg (<1% body weight) from admission  weight, which is not clinically significant. Dosing weight updated:    Dosing Weight: 85 kg (adjusted) - based on lowest documented wt so far this adm (111.5 kg on 5/12) and IBW of 75.5 kg    Assessed Nutrition Needs:  Estimated Energy Needs: 1700 - 2125 kcals/day (20 - 25 kcals/kg)   Justification: Decreased needs with wt status. Rec the high end of this range   Estimated Protein Needs: 128 - 170 grams protein/day (1.5 - 2 grams of pro/kg)   Justification: Increased needs after surgery and with body wt status   Estimated Fluid Needs: 1 mL/kcal   Justification: Maintenance or per provider pending fluid status    GI:  Per intake/output, last BM documented today with 2x occurrences. Multiple loose stools overnight per RN note. Imodium TID PRN for loose stools. Pt denies N/V.     Skin:  No new deficits noted.     MALNUTRITION  % Intake: No decreased intake noted  % Weight Loss: Weight loss does not meet criteria/difficult to interpret with confounding fluid  Subcutaneous Fat Loss: Unable to determine due to unable to complete NFPA to limit exposure and to minimize use of PPE during COVID-19 pandemic   Muscle Loss: Unable to determine due to unable to complete NFPA to limit exposure and to minimize use of PPE during COVID-19 pandemic   Fluid Accumulation/Edema: Moderate (L>R periperal edema, 2+ pitting on left per chart review)  Malnutrition Diagnosis: Unable to determine due to unable to complete NFPA to limit exposure and to minimize use of PPE during COVID-19 pandemic     Previous Goals   Diet adv v nutrition support within 2-3 days.  Evaluation: Met    Patient to consume % of nutritionally adequate meal trays TID, or the equivalent with supplements/snacks.  Evaluation: Not consistently met    Previous Nutrition Diagnosis  Predicted inadequate nutrient intake (protein-energy) related to upcoming surgery and unknown period of intubation and potentially restrictive diet order.  Evaluation: Unresolved, Updated  below    CURRENT NUTRITION DIAGNOSIS  Predicted inadequate nutrient intake (energy, protein) related to potential for variable appetite with prolonged LOS and restrictive diet order.     INTERVENTIONS  Implementation  Medical food supplement therapy - continue chocolate Boost Plus per pt's preference.     Nutrition education for recommended modifications - pt denied questions about heart healthy diet education this time, however does endorse that it is overwhelming. Reports that he plans to look at handouts in further detail once at home. Encouraged pt to review handouts as able and reach out to RD if any questions arise. Additionally, discussed option for pt to request outpatient RD visit on discharge for additional nutrition counseling.     Goals  Patient to consume % of nutritionally adequate meal trays TID, or the equivalent with supplements/snacks.    Monitoring/Evaluation  Progress toward goals will be monitored and evaluated per protocol.    Guerrero Ram, MS, RD, LD  6C RD floor pager: [phone]

## 2020-05-14 NOTE — PROGRESS NOTES
Cardiovascular Surgery Progress Note  5/14/2020         Assessment and Plan:     Rudi Schilling is a 70 year old male with a PMH of morbid obesity, recently diagnosed atrial fibrillation, multivessel CAD, and biventricular HFrEF (EF 10% on 4/21/2020 TTE) who was transferred from an OSH ([address], [place]) on 5/5/2020 for further management and evaluation for possible revascularization. Thallium study from [place] showed viability in all 3 territories.   Upon arrival, patient was noted to be in A. fib with RVR with heart rates in the 140s volume overload 2+ lower extremity edema, and labs suggestive of YAHIR, congestive hepatopathy and hyperkalemia.  Patient was started on IV diuretics, heparin drip, and digoxin for rate control (Eliquis and metoprolol were held at that time).  Patient was continued on IV diuresis and medical optimization with up titration of beta-blockers and afterload reduction.  YAHIR, congestive hepatopathy and hyperkalemia resolved with continued diuresis. He had preliminary limited workup for LVAD/transplant placement as back-up plan.   He is now S/P CABG with bilateral pulmonary vein isolation and TORIBIO ligation 5/8/2020 with Dr Holloway.        Cardiovascular:   ICM with multivessel CAD, now S/P 3 v CABG  Severe biventricular HFrEF, NYHA Class II, CHF Stage D  HTN  - Coreg 6.25 mg BID (PTA 6.25 mg), Losartan 50 mg daily  - Aspirin 81 mg daily. Atorvastatin daily  - Most recent echo (May 6) showed LV EF 30-35% with moderate RV dysfunction  Atrial Fibrillation   - Pre-op diagnosis, now s/p TORIBIO ligation and pulm vein isolation   - Had been on Amio, now just digoxin 125 mg daily   - CHADSVASC of 3 (age, vascular disease, and CHF).    - Anticoagulation with coumadin for 3 months before restarting Eliquis, bridging with Hep gtt (PTA was treat with Eliquis).  Most recent INR 1.25    Hypervolemia  - Continues with JVD. Leg edema worse on left as expected (had saphenous vein harvest)  - Continue Lasix  40 mg IV BID with good UOP (has daily unrecorded UOP). Adjusting daily.     Pulmonary:    - Extubated POD 1. Now stable on RA. Continue Pulm toilet, IS, activity and deep breathing  - Supplemental O2 PRN only to keep sats > 92%.     Neurology / MSK:  - Hx chronic back pain but is stable and controlled without PTA baclofen.   - Neuro intact; slight L sided facial droop that he says is normal for him  - Acute post-operative pain; pain well controlled with acetaminophen, PO oxycodone PRN      / Renal:  - No Hx of renal disease. Creatinine WNL, adequate UOP on diuretics.      GI / FEN:   GERD  - 2 gram sodium diet, continue bowel regimen.   - Replace electrolytes as needed, hepatic enzymes WNL.   - Loose stools; imodium TID PRN      Endocrine:  - Stress induced hyperglycemia initially managed on insulin drip postop, transitioned to sliding scale for goal BG <180.     Infectious Disease:  Positive BCx w/ staph hominis, suspected contamination  Pre-operative Fevers - resolved  - No further + blood cultures at our facility.   - Completed perioperative antibiotics.   - Will cover new sternal drainage with Ancef (started 5/14) while inpt and monitor drainage.   - WBC WNL, remains afebrile, no signs or symptoms of infection     Hematology:   - Stable aute blood loss anemia; Hgb 9's  - Plt WNL, no signs or symptoms of active bleeding     Prophylaxis:   - Stress ulcer prophylaxis: Pantoprazole 40 mg daily for 30 days  - DVT prophylaxis: SCD     Disposition:   - Transferred to  on 5/9   - Rehab currently recommending discharge to TCU but potentially home with assist   - [facility], [phone].    - Await therapeutic INR. Coumadin for 3 months (goal 2-3) for A-fib before restarting Eliquis   - Possible PO Abx at discharge, no IV needed    Discussed with CVTS Fellow as needed.  Staff surgeons have been informed of changes through both verbal and written  "communication.      Rodríguez Soriano PA-C  Cardiothoracic Surgery  Pager [phone]    8:06 AM   May 14, 2020          Interval History:     No overnight events.  States pain is well managed on current regimen. Slept well overnight.  Tolerating diet, is passing flatus, + loose BM. No nausea or vomiting.  Breathing well without complaints.   Working with therapies and ambulating in halls with assistance.   Denies chest pain, palpitations, dizziness, syncopal symptoms, fevers, chills, myalgias, or sternal popping/clicking.         Physical Exam:   Blood pressure 128/73, pulse 98, temperature 98.8  F (37.1  C), temperature source Oral, resp. rate 20, height 1.778 m (5' 10\"), weight 129 kg (284 lb 6.4 oz), SpO2 94 %.  Vitals:    05/12/20 0900 05/13/20 0456 05/14/20 0100   Weight: 130.6 kg (288 lb) 129.8 kg (286 lb 1.6 oz) 129 kg (284 lb 6.4 oz)      Weight; - 1 kg since admit and trending down slowly   24 hr Fluid status; net gain 180 mL per records but 4 unrecorded UOP.   MAPs: 76 - 107    Gen: A&Ox4, NAD  Neuro: no focal deficits   CV: RRR, normal S1 S2, no murmurs, rubs or gallops. + JVD  Pulm: CTA, no wheezing or rhonchi, normal breathing on RA  Abd: nondistended, normal BS, soft, nontender  Ext: L>R periperal edema, 2+ pitting on left (vein harvest leg)  Incision: clean, intact, no erythema, sternum stable. Scant SS drainage from inferior sternal incision.   Tubes/drain sites: scabs clean and dry         Data:    Imaging:  reviewed recent imaging, no acute concerns    Labs:  BMP  Recent Labs   Lab 05/14/20  0606 05/13/20  0618 05/12/20  1321 05/11/20  0453    133 133 136   POTASSIUM 4.2 4.5 4.4 4.5   CHLORIDE 99 100 99 101   SIDRA 8.6 8.6 8.6 8.8   CO2 28 28 30 30   BUN 22 24 26 25   CR 0.77 0.81 0.81 0.82   * 120* 112* 119*     CBC  Recent Labs   Lab 05/13/20  0618 05/11/20  0453 05/10/20  1541 05/10/20  1023   WBC 10.0 13.0* 14.5* 14.0*   RBC 3.28* 3.43* 3.55* 3.69*   HGB 9.4* 9.8* 10.1* 10.5*   HCT " 30.6* 33.0* 34.0* 35.6*   MCV 93 96 96 97   MCH 28.7 28.6 28.5 28.5   MCHC 30.7* 29.7* 29.7* 29.5*   RDW 14.4 14.6 14.5 14.5    188 176 175     INR  Recent Labs   Lab 05/14/20  0606 05/13/20  0618 05/12/20  0537 05/11/20  0831   INR 1.25* 1.19* 1.17* 1.17*      Liver Function Studies -   Recent Labs   Lab Test 05/13/20 0618   PROTTOTAL 6.8   ALBUMIN 2.6*   BILITOTAL 0.7   ALKPHOS 42   AST 30   ALT 25     GLUCOSE:   Recent Labs   Lab 05/14/20  0606 05/13/20  1110 05/13/20  0805 05/13/20  0618 05/13/20  0411 05/12/20  2132 05/12/20  1701 05/12/20  1321 05/12/20  1230  05/11/20  0453  05/10/20  1023  05/09/20  0413   *  --   --  120*  --   --   --  112*  --   --  119*  --  161*  --  147*   BGM  --  143* 109*  --  119* 138* 106*  --  116*   < >  --    < >  --    < >  --     < > = values in this interval not displayed.

## 2020-05-14 NOTE — PLAN OF CARE
Pt continues to slowly improve post-CABG. Working well with therapies & up in chair often throughout day. Sternal incision with small amt drainage at base. Drsg to be chgd bid. Started on IV abxs q8hrs with likely transition to oral at discharge per CVTS. LLE graft sites C/D/I.  Given 40mg IV lasix x1 w/good response. Pt wearing brief but needs assistance to use urinal.  Good appetite; denies nausea. Had loose stool overnight.  Mg replaced per protocol. Given 20mEq KCl x1.   Heparin gtt increased this morning w/bolus-recheck 10a at 1400. INR only 1.2 today.  Continue to monitor status as await therapeutic INR.

## 2020-05-14 NOTE — PLAN OF CARE
OT/CR 6C: Discharge Planner OT   Patient plan for discharge: Pt declining TCU; reports he will discharge to his sister's split level home  Current status: Pt with poor compliance with sternal precautions despite education and cues.  Min A supine > EOB, Min-mod A sit to stand depending upon height of surface, SBA-CGA with FWW ambulating 2 x 150ft, Mod A for toileting and LE dressing.  Pt scored 13/30 on MoCA 8.1 indicating moderate impairment.  Recommend assistance with all medication management upon discharge  Barriers to return to prior living situation: sternal precautions, impaired cognition, generalized weakness/deconditioning, assist required for mobility and ADLs  Recommendations for discharge: TCU; however, pt refusing therefore recommend 24hr assist and home OT/PT (though pt prefers OP CR)  Rationale for recommendations: Pt is currently below baseline with regards to mobility and independence with self cares and will benefit from continued skilled therapy intervention to address deficits.  If pt discharge to home would recommend obtaining walker and shower chair         Entered by: Honey Alvarez 05/14/2020 9:02 AM

## 2020-05-15 ENCOUNTER — APPOINTMENT (OUTPATIENT)
Dept: GENERAL RADIOLOGY | Facility: CLINIC | Age: 70
DRG: 234 | End: 2020-05-15
Attending: PHYSICIAN ASSISTANT
Payer: MEDICARE

## 2020-05-15 ENCOUNTER — APPOINTMENT (OUTPATIENT)
Dept: PHYSICAL THERAPY | Facility: CLINIC | Age: 70
DRG: 234 | End: 2020-05-15
Attending: THORACIC SURGERY (CARDIOTHORACIC VASCULAR SURGERY)
Payer: MEDICARE

## 2020-05-15 LAB
ALBUMIN UR-MCNC: NEGATIVE MG/DL
ANION GAP SERPL CALCULATED.3IONS-SCNC: 4 MMOL/L (ref 3–14)
APPEARANCE UR: CLEAR
BACTERIA SPEC CULT: NO GROWTH
BACTERIA SPEC CULT: NO GROWTH
BASOPHILS # BLD AUTO: 0.1 10E9/L (ref 0–0.2)
BASOPHILS NFR BLD AUTO: 0.5 %
BILIRUB UR QL STRIP: NEGATIVE
BUN SERPL-MCNC: 21 MG/DL (ref 7–30)
CALCIUM SERPL-MCNC: 8.8 MG/DL (ref 8.5–10.1)
CHLORIDE SERPL-SCNC: 98 MMOL/L (ref 94–109)
CO2 SERPL-SCNC: 31 MMOL/L (ref 20–32)
COLOR UR AUTO: ABNORMAL
CREAT SERPL-MCNC: 0.85 MG/DL (ref 0.66–1.25)
CRP SERPL-MCNC: 71 MG/L (ref 0–8)
DIFFERENTIAL METHOD BLD: ABNORMAL
EOSINOPHIL # BLD AUTO: 1.1 10E9/L (ref 0–0.7)
EOSINOPHIL NFR BLD AUTO: 7.5 %
ERYTHROCYTE [DISTWIDTH] IN BLOOD BY AUTOMATED COUNT: 14.6 % (ref 10–15)
GFR SERPL CREATININE-BSD FRML MDRD: 90 ML/MIN/{1.73_M2}
GLUCOSE SERPL-MCNC: 114 MG/DL (ref 70–99)
GLUCOSE UR STRIP-MCNC: NEGATIVE MG/DL
GRAM STN SPEC: NORMAL
HCT VFR BLD AUTO: 32 % (ref 40–53)
HGB BLD-MCNC: 9.6 G/DL (ref 13.3–17.7)
HGB UR QL STRIP: ABNORMAL
IMM GRANULOCYTES # BLD: 0.4 10E9/L (ref 0–0.4)
IMM GRANULOCYTES NFR BLD: 2.8 %
INR PPP: 1.29 (ref 0.86–1.14)
KETONES UR STRIP-MCNC: NEGATIVE MG/DL
LACTATE BLD-SCNC: 1.2 MMOL/L (ref 0.7–2)
LEUKOCYTE ESTERASE UR QL STRIP: ABNORMAL
LMWH PPP CHRO-ACNC: 0.27 IU/ML
LYMPHOCYTES # BLD AUTO: 1.6 10E9/L (ref 0.8–5.3)
LYMPHOCYTES NFR BLD AUTO: 10.4 %
Lab: NORMAL
MAGNESIUM SERPL-MCNC: 2 MG/DL (ref 1.6–2.3)
MCH RBC QN AUTO: 28.4 PG (ref 26.5–33)
MCHC RBC AUTO-ENTMCNC: 30 G/DL (ref 31.5–36.5)
MCV RBC AUTO: 95 FL (ref 78–100)
MONOCYTES # BLD AUTO: 1.8 10E9/L (ref 0–1.3)
MONOCYTES NFR BLD AUTO: 12 %
NEUTROPHILS # BLD AUTO: 10.2 10E9/L (ref 1.6–8.3)
NEUTROPHILS NFR BLD AUTO: 66.8 %
NITRATE UR QL: NEGATIVE
NRBC # BLD AUTO: 0 10*3/UL
NRBC BLD AUTO-RTO: 0 /100
PH UR STRIP: 6 PH (ref 5–7)
PLATELET # BLD AUTO: 348 10E9/L (ref 150–450)
POTASSIUM SERPL-SCNC: 4.6 MMOL/L (ref 3.4–5.3)
PROCALCITONIN SERPL-MCNC: 0.07 NG/ML
RBC # BLD AUTO: 3.38 10E12/L (ref 4.4–5.9)
RBC #/AREA URNS AUTO: 0 /HPF (ref 0–2)
SODIUM SERPL-SCNC: 134 MMOL/L (ref 133–144)
SOURCE: ABNORMAL
SP GR UR STRIP: 1 (ref 1–1.03)
SPECIMEN SOURCE: NORMAL
UROBILINOGEN UR STRIP-MCNC: NORMAL MG/DL (ref 0–2)
WBC # BLD AUTO: 14.6 10E9/L (ref 4–11)
WBC #/AREA URNS AUTO: 2 /HPF (ref 0–5)

## 2020-05-15 PROCEDURE — 36415 COLL VENOUS BLD VENIPUNCTURE: CPT | Performed by: PHYSICIAN ASSISTANT

## 2020-05-15 PROCEDURE — 25000132 ZZH RX MED GY IP 250 OP 250 PS 637: Mod: GY | Performed by: SURGERY

## 2020-05-15 PROCEDURE — 25000132 ZZH RX MED GY IP 250 OP 250 PS 637: Mod: GY | Performed by: STUDENT IN AN ORGANIZED HEALTH CARE EDUCATION/TRAINING PROGRAM

## 2020-05-15 PROCEDURE — 74018 RADEX ABDOMEN 1 VIEW: CPT

## 2020-05-15 PROCEDURE — 25000128 H RX IP 250 OP 636: Performed by: SURGERY

## 2020-05-15 PROCEDURE — 99233 SBSQ HOSP IP/OBS HIGH 50: CPT | Mod: GC | Performed by: INTERNAL MEDICINE

## 2020-05-15 PROCEDURE — 87070 CULTURE OTHR SPECIMN AEROBIC: CPT | Performed by: PHYSICIAN ASSISTANT

## 2020-05-15 PROCEDURE — 87077 CULTURE AEROBIC IDENTIFY: CPT | Performed by: PHYSICIAN ASSISTANT

## 2020-05-15 PROCEDURE — 85004 AUTOMATED DIFF WBC COUNT: CPT | Performed by: PHYSICIAN ASSISTANT

## 2020-05-15 PROCEDURE — 83605 ASSAY OF LACTIC ACID: CPT | Performed by: THORACIC SURGERY (CARDIOTHORACIC VASCULAR SURGERY)

## 2020-05-15 PROCEDURE — 21400000 ZZH R&B CCU UMMC

## 2020-05-15 PROCEDURE — 87186 SC STD MICRODIL/AGAR DIL: CPT | Performed by: PHYSICIAN ASSISTANT

## 2020-05-15 PROCEDURE — 85520 HEPARIN ASSAY: CPT | Performed by: PHYSICIAN ASSISTANT

## 2020-05-15 PROCEDURE — 84145 PROCALCITONIN (PCT): CPT | Performed by: PHYSICIAN ASSISTANT

## 2020-05-15 PROCEDURE — 83735 ASSAY OF MAGNESIUM: CPT | Performed by: PHYSICIAN ASSISTANT

## 2020-05-15 PROCEDURE — 97116 GAIT TRAINING THERAPY: CPT | Mod: GP

## 2020-05-15 PROCEDURE — 25000128 H RX IP 250 OP 636: Performed by: PHYSICIAN ASSISTANT

## 2020-05-15 PROCEDURE — 87040 BLOOD CULTURE FOR BACTERIA: CPT | Performed by: PHYSICIAN ASSISTANT

## 2020-05-15 PROCEDURE — 80048 BASIC METABOLIC PNL TOTAL CA: CPT | Performed by: PHYSICIAN ASSISTANT

## 2020-05-15 PROCEDURE — 36415 COLL VENOUS BLD VENIPUNCTURE: CPT | Performed by: THORACIC SURGERY (CARDIOTHORACIC VASCULAR SURGERY)

## 2020-05-15 PROCEDURE — 86140 C-REACTIVE PROTEIN: CPT | Performed by: PHYSICIAN ASSISTANT

## 2020-05-15 PROCEDURE — 97530 THERAPEUTIC ACTIVITIES: CPT | Mod: GP

## 2020-05-15 PROCEDURE — 25000132 ZZH RX MED GY IP 250 OP 250 PS 637: Mod: GY

## 2020-05-15 PROCEDURE — 85610 PROTHROMBIN TIME: CPT | Performed by: PHYSICIAN ASSISTANT

## 2020-05-15 PROCEDURE — 25000132 ZZH RX MED GY IP 250 OP 250 PS 637: Mod: GY | Performed by: PHYSICIAN ASSISTANT

## 2020-05-15 PROCEDURE — 81001 URINALYSIS AUTO W/SCOPE: CPT | Performed by: PHYSICIAN ASSISTANT

## 2020-05-15 PROCEDURE — 87205 SMEAR GRAM STAIN: CPT | Performed by: PHYSICIAN ASSISTANT

## 2020-05-15 PROCEDURE — 71045 X-RAY EXAM CHEST 1 VIEW: CPT

## 2020-05-15 PROCEDURE — 85027 COMPLETE CBC AUTOMATED: CPT | Performed by: PHYSICIAN ASSISTANT

## 2020-05-15 RX ORDER — WARFARIN SODIUM 10 MG/1
10 TABLET ORAL
Status: COMPLETED | OUTPATIENT
Start: 2020-05-15 | End: 2020-05-15

## 2020-05-15 RX ORDER — POLYETHYLENE GLYCOL 3350 17 G/17G
17 POWDER, FOR SOLUTION ORAL DAILY
Status: DISCONTINUED | OUTPATIENT
Start: 2020-05-15 | End: 2020-05-19 | Stop reason: HOSPADM

## 2020-05-15 RX ORDER — FUROSEMIDE 40 MG
40 TABLET ORAL
Status: DISCONTINUED | OUTPATIENT
Start: 2020-05-15 | End: 2020-05-19 | Stop reason: HOSPADM

## 2020-05-15 RX ORDER — FUROSEMIDE 40 MG
40 TABLET ORAL DAILY
Status: DISCONTINUED | OUTPATIENT
Start: 2020-05-15 | End: 2020-05-15

## 2020-05-15 RX ORDER — LOSARTAN POTASSIUM 25 MG/1
25 TABLET ORAL ONCE
Status: COMPLETED | OUTPATIENT
Start: 2020-05-15 | End: 2020-05-15

## 2020-05-15 RX ORDER — LOSARTAN POTASSIUM 25 MG/1
75 TABLET ORAL DAILY
Status: DISCONTINUED | OUTPATIENT
Start: 2020-05-16 | End: 2020-05-19 | Stop reason: HOSPADM

## 2020-05-15 RX ORDER — MAGNESIUM SULFATE HEPTAHYDRATE 40 MG/ML
2 INJECTION, SOLUTION INTRAVENOUS ONCE
Status: DISCONTINUED | OUTPATIENT
Start: 2020-05-15 | End: 2020-05-15

## 2020-05-15 RX ADMIN — DIGOXIN 125 MCG: 0.06 TABLET ORAL at 07:44

## 2020-05-15 RX ADMIN — ATORVASTATIN CALCIUM 40 MG: 40 TABLET, FILM COATED ORAL at 19:52

## 2020-05-15 RX ADMIN — HEPARIN SODIUM 1800 UNITS/HR: 10000 INJECTION, SOLUTION INTRAVENOUS at 07:43

## 2020-05-15 RX ADMIN — HEPARIN SODIUM 1800 UNITS/HR: 10000 INJECTION, SOLUTION INTRAVENOUS at 19:50

## 2020-05-15 RX ADMIN — CEFAZOLIN SODIUM 2 G: 2 INJECTION, SOLUTION INTRAVENOUS at 19:48

## 2020-05-15 RX ADMIN — MAGNESIUM SULFATE IN WATER 2 G: 40 INJECTION, SOLUTION INTRAVENOUS at 07:43

## 2020-05-15 RX ADMIN — ACETAMINOPHEN 650 MG: 325 TABLET, FILM COATED ORAL at 13:56

## 2020-05-15 RX ADMIN — FUROSEMIDE 40 MG: 40 TABLET ORAL at 17:44

## 2020-05-15 RX ADMIN — CEFAZOLIN SODIUM 2 G: 2 INJECTION, SOLUTION INTRAVENOUS at 03:29

## 2020-05-15 RX ADMIN — LOSARTAN POTASSIUM 50 MG: 25 TABLET ORAL at 07:44

## 2020-05-15 RX ADMIN — FUROSEMIDE 40 MG: 40 TABLET ORAL at 07:46

## 2020-05-15 RX ADMIN — CARVEDILOL 6.25 MG: 6.25 TABLET, FILM COATED ORAL at 07:44

## 2020-05-15 RX ADMIN — WARFARIN SODIUM 10 MG: 10 TABLET ORAL at 17:44

## 2020-05-15 RX ADMIN — ASPIRIN 81 MG: 81 TABLET, COATED ORAL at 07:44

## 2020-05-15 RX ADMIN — LOSARTAN POTASSIUM 25 MG: 25 TABLET ORAL at 13:56

## 2020-05-15 RX ADMIN — CEFAZOLIN SODIUM 2 G: 2 INJECTION, SOLUTION INTRAVENOUS at 11:30

## 2020-05-15 RX ADMIN — ACETAMINOPHEN 650 MG: 325 TABLET, FILM COATED ORAL at 07:44

## 2020-05-15 RX ADMIN — PANTOPRAZOLE SODIUM 40 MG: 40 TABLET, DELAYED RELEASE ORAL at 07:44

## 2020-05-15 RX ADMIN — CARVEDILOL 6.25 MG: 6.25 TABLET, FILM COATED ORAL at 17:44

## 2020-05-15 RX ADMIN — POLYETHYLENE GLYCOL 3350 17 G: 17 POWDER, FOR SOLUTION ORAL at 11:30

## 2020-05-15 ASSESSMENT — ACTIVITIES OF DAILY LIVING (ADL)
ADLS_ACUITY_SCORE: 18

## 2020-05-15 ASSESSMENT — PAIN DESCRIPTION - DESCRIPTORS
DESCRIPTORS: ACHING
DESCRIPTORS: ACHING;SORE

## 2020-05-15 ASSESSMENT — MIFFLIN-ST. JEOR: SCORE: 2065.37

## 2020-05-15 NOTE — PLAN OF CARE
D: Pt admitted from OSH for evaluation for revascularization, now s/p CABG x5 with bilateral pulmonary vein isolation and TORIBIO ligation 5/8. Hx of morbid obesity, recently diagnosed afib, multivessel CAD, and biventricular heart failure      I/A: Vital signs stable, afebrile, A&Ox4, afib 90-110s. Denied pain overnight. Heparin gtt continues at 1800 units/hr, 10A therapeutic. Incisions and dressings CDI. Up with assist of 1 to commode, voiding adequate amounts. IV antibiotics given as scheduled. No stools overnight. Slept off and on between cares, PRN melatonin given at HS.      P: Awaiting therapeutic INR for discharge. PT/OT rec TCU on discharge but pt refusing. Continue to monitor and notify MD with pertinent changes.

## 2020-05-15 NOTE — PLAN OF CARE
Discharge Planner PT / 6C   Patient plan for discharge: Home with assist and OP CR.  Current status: Pt completes supine > sit with HOB slightly elevated and CGA, [place] for sit > supine, continues to require cues to avoid use of UEs. Pt transfers sit <> stand with CGA-[place] pending surface height, lower surfaces more challenging. Pt ambulates ~200ft and ~100ft with FWW and CGA-SBA. Pt ambulates additional ~200ft and ~100ft with 1 UE support on IV pole and [place]. Pt slightly unsteady and requires assist to manage IV pole when utilizing for balance while walking - recommend pt continue to use FWW when mobilizing with RN staff. Pt ascends/descends 3 steps 3x with 1 HR and CGA. VSS on RA throughout. Activity tolerance gradually improving though pt continuing to need rest breaks throughout PT session.  Barriers to return to prior living situation: Medical status, sternal precautions, decreased strength / activity tolerance, impaired standing balance, stairs to enter/within home.  Recommendations for discharge: Rehab - though pt declining.  Rationale for recommendations: Pt below functional baseline and requires assist to maintain sternal precautions with mobility. Pt would benefit from continued skilled intensive rehab services to maximize functional independence and facilitate safe discharge home. However pt declining rehab stay - thus recommend home with 24-hour assist/supervision, HH PT/OT, and FWW.

## 2020-05-15 NOTE — PLAN OF CARE
Pt feeling well and more motivated for activity. Up in chair throughout day and walked w/therapy. Good appetite.  WBC up to 14.6. Afebrile. Sepsis protocol triggered-lactic acid was wnl. UA, blood and sputum cxs sent. Abdominal and CXR done. Dime-sized amt of drainage from base of sternal incision today-continue IV abxs and bid drsg chgs.   Lasix changed to oral w/moderate urine output.  No BM since small one early yesterday morning-given miralax today w/some small results thus far.  Cozaar dosing increased to 75 mg daily.  Mg replaced per protocol.   Tylenol for generalized pain x2.   INR only 1.29 today. Continues on heparin gtt w/next 10a check in a.m.   As pt is refusing rehab upon discharge, continue to encourage as much independence as safely possible w/sternal precautions.

## 2020-05-15 NOTE — PROGRESS NOTES
Cardiology Progress Note  5/6/2020      ASSESSMENT/PLAN:  Rudi Schilling is a 70 year old male with a PMH significant for morbid obesity and recently diagnosed atrial fibrillation, multivessel CAD and biventricular HFrEF (EF 10% on 4/21/2020 TTE) who was transferred from an OSH on 5/5/2020 for further management and evaluation for possible revascularization. S/P CABG 5/8/2020    Changes Today:  - Transition to PO lasix today, lasix 40mg PO BID   - Increase losartan to 75mg po daily   - Please ensure patient knows of follow-up appointment in [place]     # Severe biventricular HFrEF, NYHA CLASS II, CHF STAGE D  # ICM with multivessel CAD  # S/P CABG 5/8/2020 LIMA to LAD, SVG to OM, SVG to right PDA)/bilateral pulmonary vein isolation/TORIBIO ligation   Most recent echo on 4/21/2020 showed EF of 10%, and coronary angiogram on 4/28/2020 revealed multivessel CAD.  Transferred to [facility] for further management and evaluation for revascularization. Patient denies any complaints at this time. He reports mild symptoms of SOB with exertion but denies chest pain or palpitations. He appears euvolemic on exam. TTE from 5/6 showed moderate diffuse hypokinesis LVEF 30-35%.   - ACEI/ARB: increase losartan to 75mg po daily, can continue to increase to 100mg po daily by tomorrow if BP tolerates it   - Beta Blocker:coreg 6.25mg po BID  - Aldosterone Antagonist: will restart spironolactone 25mg po daily as tolerated   - Diuresis: Lasix 40mg po BID  - Telemetry   - Strict Is/Os, daily weights, cardiac diet, fluid restriction (<1.5L daily)     # atrial fibrillation  CHADSVASC of 3 (age, vascular disease, and CHF).  Rates of 90s on admission.  - continue coreg 6.25mg po BID, continue digoxin 125mcg po daily   - heparin gtt for anticoagulation, resumption of PO anticoagulation per primary team      CHRONIC MEDICAL PROBLEMS  # Chronic back pain: continue PTA cyclobenzaprine PRN  # GERD: continue PTA pantoprazole    FEN:Cardiac Diet  Ppx:  "Heparin gtt  Code: Full    Plan d/w Dr. Decker.    Cardiology service will sign off at this time. Please call with any questions     Radha Shirin  Cardiology PGY4    ===================================================================    SUBJECTIVE:   No complaints this AM.     OBJECTIVE:  /64   Pulse 98   Temp 98.3  F (36.8  C) (Oral)   Resp 18   Ht 1.778 m (5' 10\")   Wt 128.9 kg (284 lb 3.2 oz)   SpO2 95%   BMI 40.78 kg/m    Temp (24hrs), Av.6  F (37  C), Min:98.2  F (36.8  C), Max:98.9  F (37.2  C)    Wt:   Wt Readings from Last 5 Encounters:   05/15/20 128.9 kg (284 lb 3.2 oz)     GEN: pleasant, no acute distress  HEENT: no icterus  CV: RRR, normal s1/s2, no murmurs/rubs/s3/s4, no heave. Flat JVD while seated upright   CHEST: clear to ausculation bilaterally, no rales or wheezing  ABD: soft, obese, non-tender, normal active bowel sounds  EXTR: Trace edema bilaterally  NEURO: alert oriented, speech fluent/appropriate, motor grossly nonfocal    Labs: reviewed in EMR  CBC  Recent Labs   Lab 05/15/20  0532 20  0618 20  0453 05/10/20  1541   WBC 14.6* 10.0 13.0* 14.5*   RBC 3.38* 3.28* 3.43* 3.55*   HGB 9.6* 9.4* 9.8* 10.1*   HCT 32.0* 30.6* 33.0* 34.0*   MCV 95 93 96 96   MCH 28.4 28.7 28.6 28.5   MCHC 30.0* 30.7* 29.7* 29.7*   RDW 14.6 14.4 14.6 14.5    185 188 176     BMP  Recent Labs   Lab 05/15/20  0532 20  0606 20  0618 20  1321  20  0413  20  1528    134 133 133   < > 141  --  140   POTASSIUM 4.6 4.2 4.5 4.4   < > 4.7   < > 4.6   CHLORIDE 98 99 100 99   < > 108  --  106   CO2 31 28 28 30   < > 27  --  27   ANIONGAP 4 7 5 4   < > 6  --  8   * 148* 120* 112*   < > 147*  --  190*   BUN 21 22 24 26   < > 20  --  20   CR 0.85 0.77 0.81 0.81   < > 0.96  --  1.04   GFRESTIMATED 90 >90 >90 >90   < > 81  --  73   GFRESTBLACK >90 >90 >90 >90   < > >90  --  85   SIDRA 8.8 8.6 8.6 8.6   < > 8.6  --  8.6   MAG 2.0 1.9 1.8  --   --  2.5*  --  " 2.7*   PHOS  --  3.0 2.5  --   --  4.2  --  3.1    < > = values in this interval not displayed.     Troponins:   No results found for: TROPI, TROPONIN, TROPR, TROPN  INR  Recent Labs   Lab 05/15/20  0532 05/14/20  0606 05/13/20  0618 05/12/20  0537   INR 1.29* 1.25* 1.19* 1.17*       Imaging: reviewed in EMR.

## 2020-05-15 NOTE — PROGRESS NOTES
Cardiovascular Surgery Progress Note  5/15/2020         Assessment and Plan:     Rudi Schilling is a 70 year old male with a PMH of morbid obesity, recently diagnosed atrial fibrillation, multivessel CAD, and biventricular HFrEF (EF 10% on 4/21/2020 TTE) who was transferred from an OSH ([place], [place]) on 5/5/2020 for further management and evaluation for possible revascularization. Thallium study from [place] showed viability in all 3 territories.   Upon arrival, patient was noted to be in A. fib with RVR with heart rates in the 140s volume overload 2+ lower extremity edema, and labs suggestive of YAHIR, congestive hepatopathy and hyperkalemia.  Patient was started on IV diuretics, heparin drip, and digoxin for rate control (Eliquis and metoprolol were held at that time).  Patient was continued on IV diuresis and medical optimization with up titration of beta-blockers and afterload reduction.  YAHIR, congestive hepatopathy and hyperkalemia resolved with continued diuresis. He had preliminary limited workup for LVAD/transplant placement as back-up plan.   He is now S/P CABG with bilateral pulmonary vein isolation and TORIBIO ligation 5/8/2020 with Dr Holloway.        Cardiovascular:   ICM with multivessel CAD, now S/P 3 v CABG  Severe biventricular HFrEF, NYHA Class II, CHF Stage D  HTN  - Coreg 6.25 mg BID (PTA 6.25 mg), Losartan 50 mg daily  - Aspirin 81 mg daily. Atorvastatin daily  - Most recent echo (May 6) showed LV EF 30-35% with moderate RV dysfunction  Atrial Fibrillation   - Pre-op diagnosis, now s/p TORIBIO ligation and pulm vein isolation   - Had been on Amio, now just digoxin 125 mg daily   - CHADSVASC of 3 (age, vascular disease, and CHF).    - Anticoagulation with coumadin for 3 months before restarting Eliquis, bridging with Hep gtt (PTA was treat with Eliquis).  Most recent INR 1.29    Hypervolemia  - Improved dependent edema and JVD. Leg edema worse on left as expected (had saphenous vein harvest)  -  Transition to Lasix 40 mg PO daily per Cardiology 5/15.       Pulmonary:    - Extubated POD 1. Now stable on RA. Continue Pulm toilet, IS, activity and deep breathing  - Supplemental O2 PRN only to keep sats > 92%.     Neurology / MSK:  - Hx chronic back pain but is stable and controlled without PTA baclofen.   - Neuro intact; slight L sided facial droop that he says is normal for him  - Acute post-operative pain; pain well controlled with acetaminophen, PO oxycodone PRN      / Renal:  - No Hx of renal disease. Creatinine WNL, adequate UOP on diuretics.      GI / FEN:   GERD  - 2 gram sodium diet, continue bowel regimen.   - Replace electrolytes as needed, hepatic enzymes WNL.   - Loose stools; imodium TID PRN      Endocrine:  - Stress induced hyperglycemia initially managed on insulin drip postop, transitioned to sliding scale for goal BG <180.     Infectious Disease:  Positive BCx w/ staph hominis, suspected contamination  Pre-operative Fevers - resolved  - No further + blood cultures at our facility.   - Completed perioperative antibiotics.   - Treating sternal drainage with Ancef (started 5/14) while inpt and monitor drainage.   - WBC 14.6, remains afebrile, no signs or symptoms of infection. Low risk of infection and on ancef for past 24 hrs, but will work up with CXR/AXR, WBC diff, CRP, procal, UA, sputum, and blood cultures.      Hematology:   - Stable aute blood loss anemia; Hgb 9's  - Plt WNL, no signs or symptoms of active bleeding     Prophylaxis:   - Stress ulcer prophylaxis: Pantoprazole 40 mg daily for 30 days  - DVT prophylaxis: SCD     Disposition:   - Transferred to  on 5/9   - Rehab currently recommending discharge to TCU but potentially home with assist   - [facility] weeks, [phone].    - Await therapeutic INR. Coumadin for 3 months (goal 2-3) for A-fib before restarting Eliquis   - Possible PO Abx at discharge, no IV  "needed      Discussed with CVTS Fellow as needed.  Staff surgeons have been informed of changes through both verbal and written communication.      Rodríguez Soriano PA-C  Cardiothoracic Surgery  Pager [phone]    8:25 AM   May 15, 2020          Interval History:     No overnight events.  Feeling stronger overall.   States pain is well managed on current regimen. Slept well overnight.  Tolerating diet, is passing flatus, no BM since yesterday. No nausea or vomiting.  Breathing well without complaints.   Working with therapies and ambulating in halls without assistance.   Denies chest pain, palpitations, dizziness, syncopal symptoms, fevers, chills, myalgias, or sternal popping/clicking.         Physical Exam:   Blood pressure 107/64, pulse 98, temperature 98.3  F (36.8  C), temperature source Oral, resp. rate 18, height 1.778 m (5' 10\"), weight 128.9 kg (284 lb 3.2 oz), SpO2 95 %.  Vitals:    05/13/20 0456 05/14/20 0100 05/15/20 0548   Weight: 129.8 kg (286 lb 1.6 oz) 129 kg (284 lb 6.4 oz) 128.9 kg (284 lb 3.2 oz)      Weight; - 1.1 kg since admit and trending stable.   24 hr Fluid status; about net even.   MAPs: 71 - 92     Gen: A&Ox4, NAD  Neuro: no focal deficits   CV: irregular, normal S1 S2, no murmurs, rubs or gallops.   Pulm: CTA, no wheezing or rhonchi, normal breathing on RA  Abd: nondistended, normal BS, soft, nontender  Ext: trace L periperal edema, non-pitting  Incision: clean, dry, intact, no erythema, sternum stable  Tubes/drain sites: dressing clean and dry         Data:    Imaging:  reviewed recent imaging, no acute concerns    Labs:  [facility]  Recent Labs   Lab 05/15/20  0532 05/14/20  0606 05/13/20  0618 05/12/20  1321    134 133 133   POTASSIUM 4.6 4.2 4.5 4.4   CHLORIDE 98 99 100 99   SIDRA 8.8 8.6 8.6 8.6   CO2 31 28 28 30   BUN 21 22 24 26   CR 0.85 0.77 0.81 0.81   * 148* 120* 112*     CBC  Recent Labs   Lab 05/15/20  0532 05/13/20  0618 05/11/20  0453 05/10/20  1541   WBC 14.6* 10.0 " 13.0* 14.5*   RBC 3.38* 3.28* 3.43* 3.55*   HGB 9.6* 9.4* 9.8* 10.1*   HCT 32.0* 30.6* 33.0* 34.0*   MCV 95 93 96 96   MCH 28.4 28.7 28.6 28.5   MCHC 30.0* 30.7* 29.7* 29.7*   RDW 14.6 14.4 14.6 14.5    185 188 176     INR  Recent Labs   Lab 05/15/20  0532 05/14/20  0606 05/13/20  0618 05/12/20  0537   INR 1.29* 1.25* 1.19* 1.17*      Liver Function Studies -   Recent Labs   Lab Test 05/13/20 0618   PROTTOTAL 6.8   ALBUMIN 2.6*   BILITOTAL 0.7   ALKPHOS 42   AST 30   ALT 25     GLUCOSE:   Recent Labs   Lab 05/15/20  0532 05/14/20  0606 05/13/20  1110 05/13/20  0805 05/13/20  0618 05/13/20  0411 05/12/20  2132 05/12/20  1701 05/12/20  1321 05/12/20  1230  05/11/20  0453  05/10/20  1023   * 148*  --   --  120*  --   --   --  112*  --   --  119*  --  161*   BGM  --   --  143* 109*  --  119* 138* 106*  --  116*   < >  --    < >  --     < > = values in this interval not displayed.

## 2020-05-16 ENCOUNTER — APPOINTMENT (OUTPATIENT)
Dept: OCCUPATIONAL THERAPY | Facility: CLINIC | Age: 70
DRG: 234 | End: 2020-05-16
Attending: THORACIC SURGERY (CARDIOTHORACIC VASCULAR SURGERY)
Payer: MEDICARE

## 2020-05-16 ENCOUNTER — APPOINTMENT (OUTPATIENT)
Dept: PHYSICAL THERAPY | Facility: CLINIC | Age: 70
DRG: 234 | End: 2020-05-16
Attending: THORACIC SURGERY (CARDIOTHORACIC VASCULAR SURGERY)
Payer: MEDICARE

## 2020-05-16 LAB
ANION GAP SERPL CALCULATED.3IONS-SCNC: 4 MMOL/L (ref 3–14)
BUN SERPL-MCNC: 17 MG/DL (ref 7–30)
CALCIUM SERPL-MCNC: 8.7 MG/DL (ref 8.5–10.1)
CHLORIDE SERPL-SCNC: 99 MMOL/L (ref 94–109)
CO2 SERPL-SCNC: 31 MMOL/L (ref 20–32)
CREAT SERPL-MCNC: 0.8 MG/DL (ref 0.66–1.25)
ERYTHROCYTE [DISTWIDTH] IN BLOOD BY AUTOMATED COUNT: 14.8 % (ref 10–15)
GFR SERPL CREATININE-BSD FRML MDRD: >90 ML/MIN/{1.73_M2}
GLUCOSE SERPL-MCNC: 125 MG/DL (ref 70–99)
HCT VFR BLD AUTO: 31.1 % (ref 40–53)
HGB BLD-MCNC: 9.2 G/DL (ref 13.3–17.7)
INR PPP: 1.43 (ref 0.86–1.14)
LACTATE BLD-SCNC: 1.9 MMOL/L (ref 0.7–2)
LMWH PPP CHRO-ACNC: 0.18 IU/ML
MAGNESIUM SERPL-MCNC: 1.9 MG/DL (ref 1.6–2.3)
MCH RBC QN AUTO: 28.5 PG (ref 26.5–33)
MCHC RBC AUTO-ENTMCNC: 29.6 G/DL (ref 31.5–36.5)
MCV RBC AUTO: 96 FL (ref 78–100)
PLATELET # BLD AUTO: 314 10E9/L (ref 150–450)
POTASSIUM SERPL-SCNC: 4.7 MMOL/L (ref 3.4–5.3)
RBC # BLD AUTO: 3.23 10E12/L (ref 4.4–5.9)
SODIUM SERPL-SCNC: 135 MMOL/L (ref 133–144)
WBC # BLD AUTO: 13.7 10E9/L (ref 4–11)

## 2020-05-16 PROCEDURE — 97116 GAIT TRAINING THERAPY: CPT | Mod: GP

## 2020-05-16 PROCEDURE — 36592 COLLECT BLOOD FROM PICC: CPT | Performed by: PHYSICIAN ASSISTANT

## 2020-05-16 PROCEDURE — 83605 ASSAY OF LACTIC ACID: CPT | Performed by: THORACIC SURGERY (CARDIOTHORACIC VASCULAR SURGERY)

## 2020-05-16 PROCEDURE — 25000132 ZZH RX MED GY IP 250 OP 250 PS 637: Mod: GY | Performed by: PHYSICIAN ASSISTANT

## 2020-05-16 PROCEDURE — 25000128 H RX IP 250 OP 636: Performed by: PHYSICIAN ASSISTANT

## 2020-05-16 PROCEDURE — 97530 THERAPEUTIC ACTIVITIES: CPT | Mod: GO

## 2020-05-16 PROCEDURE — 85027 COMPLETE CBC AUTOMATED: CPT | Performed by: PHYSICIAN ASSISTANT

## 2020-05-16 PROCEDURE — 25000128 H RX IP 250 OP 636: Performed by: SURGERY

## 2020-05-16 PROCEDURE — 85610 PROTHROMBIN TIME: CPT | Performed by: PHYSICIAN ASSISTANT

## 2020-05-16 PROCEDURE — 21400000 ZZH R&B CCU UMMC

## 2020-05-16 PROCEDURE — 97535 SELF CARE MNGMENT TRAINING: CPT | Mod: GO

## 2020-05-16 PROCEDURE — 25000132 ZZH RX MED GY IP 250 OP 250 PS 637: Mod: GY | Performed by: SURGERY

## 2020-05-16 PROCEDURE — 97530 THERAPEUTIC ACTIVITIES: CPT | Mod: GP

## 2020-05-16 PROCEDURE — 36592 COLLECT BLOOD FROM PICC: CPT | Performed by: THORACIC SURGERY (CARDIOTHORACIC VASCULAR SURGERY)

## 2020-05-16 PROCEDURE — 25000132 ZZH RX MED GY IP 250 OP 250 PS 637: Mod: GY | Performed by: STUDENT IN AN ORGANIZED HEALTH CARE EDUCATION/TRAINING PROGRAM

## 2020-05-16 PROCEDURE — 25000132 ZZH RX MED GY IP 250 OP 250 PS 637: Mod: GY | Performed by: THORACIC SURGERY (CARDIOTHORACIC VASCULAR SURGERY)

## 2020-05-16 PROCEDURE — 83735 ASSAY OF MAGNESIUM: CPT | Performed by: PHYSICIAN ASSISTANT

## 2020-05-16 PROCEDURE — 25000132 ZZH RX MED GY IP 250 OP 250 PS 637: Mod: GY

## 2020-05-16 PROCEDURE — 80048 BASIC METABOLIC PNL TOTAL CA: CPT | Performed by: PHYSICIAN ASSISTANT

## 2020-05-16 PROCEDURE — 85520 HEPARIN ASSAY: CPT | Performed by: PHYSICIAN ASSISTANT

## 2020-05-16 RX ORDER — WARFARIN SODIUM 10 MG/1
10 TABLET ORAL
Status: DISCONTINUED | OUTPATIENT
Start: 2020-05-16 | End: 2020-05-16 | Stop reason: CLARIF

## 2020-05-16 RX ORDER — WARFARIN SODIUM 7.5 MG/1
7.5 TABLET ORAL
Status: COMPLETED | OUTPATIENT
Start: 2020-05-16 | End: 2020-05-16

## 2020-05-16 RX ADMIN — ACETAMINOPHEN 650 MG: 325 TABLET, FILM COATED ORAL at 08:18

## 2020-05-16 RX ADMIN — HEPARIN SODIUM 1800 UNITS/HR: 10000 INJECTION, SOLUTION INTRAVENOUS at 10:26

## 2020-05-16 RX ADMIN — DIGOXIN 125 MCG: 0.06 TABLET ORAL at 08:18

## 2020-05-16 RX ADMIN — LOSARTAN POTASSIUM 75 MG: 25 TABLET, FILM COATED ORAL at 08:17

## 2020-05-16 RX ADMIN — CEFAZOLIN SODIUM 2 G: 2 INJECTION, SOLUTION INTRAVENOUS at 03:54

## 2020-05-16 RX ADMIN — WARFARIN SODIUM 7.5 MG: 7.5 TABLET ORAL at 17:10

## 2020-05-16 RX ADMIN — ASPIRIN 81 MG: 81 TABLET, COATED ORAL at 08:18

## 2020-05-16 RX ADMIN — FUROSEMIDE 40 MG: 40 TABLET ORAL at 08:18

## 2020-05-16 RX ADMIN — CEFAZOLIN SODIUM 2 G: 2 INJECTION, SOLUTION INTRAVENOUS at 12:14

## 2020-05-16 RX ADMIN — CARVEDILOL 6.25 MG: 6.25 TABLET, FILM COATED ORAL at 08:17

## 2020-05-16 RX ADMIN — HEPARIN SODIUM 1800 UNITS/HR: 10000 INJECTION, SOLUTION INTRAVENOUS at 23:54

## 2020-05-16 RX ADMIN — PANTOPRAZOLE SODIUM 40 MG: 40 TABLET, DELAYED RELEASE ORAL at 08:17

## 2020-05-16 RX ADMIN — CARVEDILOL 6.25 MG: 6.25 TABLET, FILM COATED ORAL at 16:30

## 2020-05-16 RX ADMIN — FUROSEMIDE 40 MG: 40 TABLET ORAL at 16:30

## 2020-05-16 RX ADMIN — ATORVASTATIN CALCIUM 40 MG: 40 TABLET, FILM COATED ORAL at 19:56

## 2020-05-16 RX ADMIN — AMOXICILLIN AND CLAVULANATE POTASSIUM 1 TABLET: 875; 125 TABLET, FILM COATED ORAL at 19:56

## 2020-05-16 RX ADMIN — MAGNESIUM SULFATE IN WATER 2 G: 40 INJECTION, SOLUTION INTRAVENOUS at 08:17

## 2020-05-16 ASSESSMENT — ACTIVITIES OF DAILY LIVING (ADL)
ADLS_ACUITY_SCORE: 18

## 2020-05-16 ASSESSMENT — MIFFLIN-ST. JEOR: SCORE: 2062.2

## 2020-05-16 NOTE — PLAN OF CARE
Discharge Planner OT   Patient plan for discharge: home with A and OP CR  Current status: Pt SBA with FWW for functional transfers and mobility. Pt CGA supine > sitting EOB. Pt does require cues to avoid use of UEs during transfers. Pt completing ~10 mins of standing ADLs at sink with SBA. Pt mod I for clean up after toileting, mod A for LB dressing. Pt completing 10 STS with SBA. All VSS on RA.  Barriers to return to prior living situation: deconditioning, medical status, cognitive status, lives alone  Recommendations for discharge: TCU, although pt declining TCU - if returns home, recommend HH OT/PT, OP CR and increased assist for ADL/IADL completion  Rationale for recommendations: Pt is below baseline and would benefit from continued skilled therapy to increase activity tolerance and independence with ADLs.  Pt requires assist for higher level cognitive IADLs. However pt declining TCU stay, pt to benefit from HH OT/PT and OP CR to maximize cardiopulmonary and functional outcomes.        Entered by: Raysa Matson 05/16/2020 8:27 AM

## 2020-05-16 NOTE — PLAN OF CARE
Pt's niece called this afternoon asking about future dental procedures.     Wait time after CABG? Medications need to be taken prior to dental procedures.     Spoke with CVTS [facility] No wait time or medications. Will be reflected in discharge paperwork.     .Elisa Yeung RN on 5/16/2020 at 3:09 PM

## 2020-05-16 NOTE — PROVIDER NOTIFICATION
Pt triggered sepsis protocol    Lactic Acid: 1.9    Team notified @ [phone] via pager    Elisa Yeung RN on 5/16/2020 at 9:15 AM

## 2020-05-16 NOTE — PROGRESS NOTES
Cardiovascular Surgery Progress Note  05/16/2020         Assessment and Plan:     Rudi Schilling is a 70 year old male with a PMH of morbid obesity, recently diagnosed atrial fibrillation, multivessel CAD, and biventricular HFrEF (EF 10% on 4/21/2020 TTE) who was transferred from an OSH ([place], [place]) on 5/5/2020 for further management and evaluation for possible revascularization. Thallium study from [place] showed viability in all 3 territories.   Upon arrival, patient was noted to be in A. fib with RVR with heart rates in the 140s volume overload 2+ lower extremity edema, and labs suggestive of YAHIR, congestive hepatopathy and hyperkalemia.  Patient was started on IV diuretics, heparin drip, and digoxin for rate control (Eliquis and metoprolol were held at that time).  Patient was continued on IV diuresis and medical optimization with up titration of beta-blockers and afterload reduction.  YAHIR, congestive hepatopathy and hyperkalemia resolved with continued diuresis. He had preliminary limited workup for LVAD/transplant placement as back-up plan.   He is now S/P CABG with bilateral pulmonary vein isolation and TORIBIO ligation 5/8/2020 with Dr Holloway.        Cardiovascular:   ICM with multivessel CAD, now S/P 3 v CABG  Severe biventricular HFrEF, NYHA Class II, CHF Stage D  HTN  - Aspirin 81 mg daily. Atorvastatin daily  - Coreg 6.25 mg BID (PTA 6.25 mg), Losartan 75 mg daily (last increased 5/15)  - Most recent echo (May 6) showed LV EF 30-35% with moderate RV dysfunction. Repeat post-op echo for new baseline 5/16  Atrial Fibrillation   Pre-op diagnosis, now s/p TORIBIO ligation and pulm vein isolation. Had been on Amio, now just digoxin 125 mg daily. CHADSVASC of 3 (age, vascular disease, and CHF). Anticoagulation with coumadin for 3 months before restarting Eliquis, (PTA was treat with Eliquis).    - Continue coumadin and heparin bridge, most recent INR 1.43    Hypervolemia  - Improved dependent edema and  JVD. Leg edema worse on left as expected (had saphenous vein harvest)  - Transition to Lasix 40 mg PO BID per Cardiology 5/15, weight trending down on po lasix.       Pulmonary:    - Extubated POD 1. Now stable on RA. Continue Pulm toilet, IS, activity and deep breathing  - Supplemental O2 PRN only to keep sats > 92%.     Neurology / MSK:  - Hx chronic back pain but is stable and controlled without PTA baclofen.   - Neuro intact; slight L sided facial droop that he says is normal for him  - Acute post-operative pain; pain well controlled with acetaminophen, PO oxycodone PRN      / Renal:  - No Hx of renal disease. Creatinine WNL, adequate UOP on diuretics.      GI / FEN:   GERD  - 2 gram sodium diet, continue bowel regimen.   - Replace electrolytes as needed, hepatic enzymes WNL.   - Abdominal x-ray 5/16 with moderately dilated loop of large and small bowel, having bowel movements, soft, improving, no abdominal pain or bloating, lactate 1.9.   - recheck lactic in am   - continue to monitor.      Endocrine:  - Stress induced hyperglycemia initially managed on insulin drip postop, transitioned to sliding scale for goal BG <180.     Infectious Disease:  Positive BCx w/ staph hominis, suspected contamination  Pre-operative Fevers - resolved  - No further + blood cultures at our facility.   - Completed perioperative antibiotics.   Sternal drainage/leukocytosis - Treating with Ancef (started 5/14) while inpt, WBC 13.7 and trending down, remains afebrile, no signs or symptoms of infection. Low risk of infection and on ancef for past 24 hrs. CXR with mild increase in retrocardiac opacity, likely atelectasis, procal low risk of bacterial infection, UA unremarkable.   - Switch to PO augmentin today for sternal drainage  - Check c.diff     Hematology:   - Stable aute blood loss anemia; Hgb stable  - Plt WNL, no signs or symptoms of active bleeding     Prophylaxis:   - Stress ulcer prophylaxis: Pantoprazole 40 mg daily for 30  "days  - DVT prophylaxis: SCD     Disposition:   - Transferred to  on 5/9   - Rehab currently recommending discharge to TCU but potentially home with assist   - [facility] 1-2 weeks, [phone].    - Await therapeutic INR. Coumadin for 3 months (goal 2-3) for A-fib before restarting Eliquis       Discussed with CVTS Fellow as needed.  Staff surgeons have been informed of changes through both verbal and written communication.      Mando Velazco PA-C  Cardiothoracic Surgery  p: [phone]            Interval History:   No overnight events.  Feeling stronger overall.   States pain is well managed on current regimen. Slept well overnight.  Tolerating diet, is passing flatus, has soft stool this am, not loose. Denies any abdominal pain, bloating. No nausea or vomiting.  Breathing well without complaints.   Working with therapies and ambulating in halls without assistance.   Denies chest pain, palpitations, dizziness, syncopal symptoms, fevers, chills, myalgias, or sternal popping/clicking.         Physical Exam:   Blood pressure 107/66, pulse 98, temperature 98.4  F (36.9  C), temperature source Oral, resp. rate 18, height 1.778 m (5' 10\"), weight 128.6 kg (283 lb 8 oz), SpO2 94 %.  Vitals:    05/14/20 0100 05/15/20 0548 05/16/20 0338   Weight: 129 kg (284 lb 6.4 oz) 128.9 kg (284 lb 3.2 oz) 128.6 kg (283 lb 8 oz)      Gen: A&Ox4, NAD  Neuro: no focal deficits   CV: irregular, normal S1 S2, no murmurs, rubs or gallops.   Pulm: CTA, no wheezing or rhonchi, normal breathing on RA  Abd: nondistended, normal BS, soft, nontender  Ext: trace L periperal edema, non-pitting  Incision: clean, dry, intact, no erythema, sternum stable  Tubes/drain sites: dressing clean and dry         Data:    Imaging:  reviewed recent imaging, no acute concerns  Recent Results (from the past 24 hour(s))   XR Abdomen Port 1 View    Narrative    EXAM: XR ABDOMEN PORT 1 VW  5/15/2020 9:56 " AM      HISTORY: abd discomfort and new leukocytosis    COMPARISON: Abdominal radiograph 5/8/2020, CT chest abdomen pelvis  5/7/2020    FINDINGS: Supine abdominal radiograph. Multiple gaseous distended  loops of small and large bowel, including a moderately dilated loop of  presumably sigmoid colon measuring 8.5 cm. No pneumatosis. No portal  venous gas.      Impression    IMPRESSION: Multiple prominent loops of air-filled small and large  bowel, nonspecific. This is increased from 5/8/2020.    I have personally reviewed the examination and initial interpretation  and I agree with the findings.    OREN DURAN MD   XR Chest Port 1 View    Narrative    Exam: XR CHEST PORT 1 VW, 5/15/2020 9:56 AM    Indication: s/p CAB, new leukocytosis    Comparison: 5/12/2020    Findings:   Single portable AP view of the chest. Right upper extremity PICC tip  over the right atrium. Intact median sternotomy wires. Left atrial  appendage clip.    The trachea is midline. The cardiac silhouette is stably enlarged. No  appreciable pneumothorax. Small bilateral pleural effusions appear  similar to prior. Mildly increased retrocardiac opacity. No  significant change in mild streaky bibasilar opacities. The visualized  upper abdomen is unremarkable. No acute osseous lesion.      Impression    Impression:   1. Mild increase in retrocardiac opacity, favored to represent  atelectasis, though infection is difficult to fully exclude.  2. Mild streaky right basilar opacities are similar to prior, favored  to represent atelectasis.  3. Stable small bilateral pleural effusions.    I have personally reviewed the examination and initial interpretation  and I agree with the findings.    GILLES HERNANDEZ MD       Labs:  BMP  Recent Labs   Lab 05/16/20  0444 05/15/20  0532 05/14/20  0606 05/13/20  0618    134 134 133   POTASSIUM 4.7 4.6 4.2 4.5   CHLORIDE 99 98 99 100   SIDRA 8.7 8.8 8.6 8.6   CO2 31 31 28 28   BUN 17 21 22 24   CR 0.80 0.85  0.77 0.81   * 114* 148* 120*     CBC  Recent Labs   Lab 05/16/20 0444 05/15/20  0532 05/13/20  0618 05/11/20  0453   WBC 13.7* 14.6* 10.0 13.0*   RBC 3.23* 3.38* 3.28* 3.43*   HGB 9.2* 9.6* 9.4* 9.8*   HCT 31.1* 32.0* 30.6* 33.0*   MCV 96 95 93 96   MCH 28.5 28.4 28.7 28.6   MCHC 29.6* 30.0* 30.7* 29.7*   RDW 14.8 14.6 14.4 14.6    348 185 188     INR  Recent Labs   Lab 05/16/20  0444 05/15/20  0532 05/14/20  0606 05/13/20  0618   INR 1.43* 1.29* 1.25* 1.19*      Liver Function Studies -   Recent Labs   Lab Test 05/13/20 0618   PROTTOTAL 6.8   ALBUMIN 2.6*   BILITOTAL 0.7   ALKPHOS 42   AST 30   ALT 25     GLUCOSE:   Recent Labs   Lab 05/16/20  0444 05/15/20  0532 05/14/20  0606 05/13/20  1110 05/13/20  0805 05/13/20  0618 05/13/20  0411 05/12/20  2132 05/12/20  1701 05/12/20  1321 05/12/20  1230  05/11/20  0453   * 114* 148*  --   --  120*  --   --   --  112*  --   --  119*   BGM  --   --   --  143* 109*  --  119* 138* 106*  --  116*   < >  --     < > = values in this interval not displayed.

## 2020-05-16 NOTE — PROGRESS NOTES
[id number]:  Neuro: A&Ox4. Calls appropriately.   Activity: Up with Ax1.   Vitals: VSS on RA.     LDAS: R DL PICC with purple lumen at TKO and gray SL. R PIV with Heparin drip at 1800 units/hr.   Cardiac: A-fib, 90-100s.   Respiratory: Maintaining O2 sats between 92-94% on RA. Encouraged deep breathing. Denies SOB.      GI/: Voiding spontaneously. No BM this shift, positive bowel sounds.   Skin: No new deficits. Sternal incision, CDI.      Pain: Denies.   Diet: 2gm NA diet.   Labs/Imaging: Hep10A 0.18- no rate change. INR 1.43.   Plan: Awaiting therapeutic INR. Continue to monitor and follow POC.

## 2020-05-16 NOTE — PLAN OF CARE
Discharge Planner PT / 6C   Patient plan for discharge: Home with assist and OP CR.  Current status: Pt transfers sit <> stand with CGA, cues for sternal precautions. Pt ambulates ~300ft and ~100ft with 1 UE support on IV pole, improved stability and gait mechanics, continues to require CGA for safety. Pt needing seated break between bouts 2/2 fatigue/mild SOB. VSS on RA.  Barriers to return to prior living situation: Medical status, sternal precautions, decreased strength / activity tolerance, impaired standing balance, stairs to enter/within home.  Recommendations for discharge: Rehab - though pt declining.  Rationale for recommendations: Pt below functional baseline and requires assist to maintain sternal precautions with mobility. Pt would benefit from continued skilled intensive rehab services to maximize functional independence and facilitate safe discharge home. However pt declining rehab stay - thus recommend home with 24-hour assist/supervision, HH PT/OT, and FWW.

## 2020-05-16 NOTE — PROGRESS NOTES
D:pt up multiple times with small amount of stool passing gas  A:BS+, pt with large habitus  I:promote activity cont with prn  P:Per Primary

## 2020-05-16 NOTE — PLAN OF CARE
D: CABG X2 5/8/2020. PMH: Multivessel CAD, HFrEF (10%)     I: Monitored vitals and assessed pt status.   Changed: No Change  Running: TKO & Hep gtt 1800 units/hr Xa therapeutic  PRN: Tylenol 650 mg X 1, Mag Replaced per protocol  Tele: Afib  O2: RA  Mobility: SBA    A: A0x4. VSS. Afebrile. Urinating adequately. +BM. Plan to discharge to TCU vs Home with assistance once INR therapeutic. 1.48 today. Sternal incision CDI, minimal drainage    P: Continue to monitor Pt status and report changes to CVTS.    Temp:  [98.4  F (36.9  C)-99.3  F (37.4  C)] 98.4  F (36.9  C)  Heart Rate:  [] 96  Resp:  [18-24] 18  BP: ()/(50-68) 107/66  SpO2:  [93 %-94 %] 94 %

## 2020-05-17 ENCOUNTER — APPOINTMENT (OUTPATIENT)
Dept: OCCUPATIONAL THERAPY | Facility: CLINIC | Age: 70
DRG: 234 | End: 2020-05-17
Attending: THORACIC SURGERY (CARDIOTHORACIC VASCULAR SURGERY)
Payer: MEDICARE

## 2020-05-17 ENCOUNTER — APPOINTMENT (OUTPATIENT)
Dept: CARDIOLOGY | Facility: CLINIC | Age: 70
DRG: 234 | End: 2020-05-17
Attending: PHYSICIAN ASSISTANT
Payer: MEDICARE

## 2020-05-17 LAB
ANION GAP SERPL CALCULATED.3IONS-SCNC: 3 MMOL/L (ref 3–14)
BUN SERPL-MCNC: 16 MG/DL (ref 7–30)
CALCIUM SERPL-MCNC: 8.9 MG/DL (ref 8.5–10.1)
CHLORIDE SERPL-SCNC: 100 MMOL/L (ref 94–109)
CO2 SERPL-SCNC: 32 MMOL/L (ref 20–32)
CREAT SERPL-MCNC: 0.81 MG/DL (ref 0.66–1.25)
ERYTHROCYTE [DISTWIDTH] IN BLOOD BY AUTOMATED COUNT: 14.9 % (ref 10–15)
GFR SERPL CREATININE-BSD FRML MDRD: >90 ML/MIN/{1.73_M2}
GLUCOSE SERPL-MCNC: 110 MG/DL (ref 70–99)
HCT VFR BLD AUTO: 33.2 % (ref 40–53)
HGB BLD-MCNC: 9.5 G/DL (ref 13.3–17.7)
INR PPP: 1.8 (ref 0.86–1.14)
LACTATE BLD-SCNC: 0.9 MMOL/L (ref 0.7–2)
LACTATE BLD-SCNC: 1 MMOL/L (ref 0.7–2)
LMWH PPP CHRO-ACNC: 0.13 IU/ML
LMWH PPP CHRO-ACNC: 0.15 IU/ML
LMWH PPP CHRO-ACNC: 0.37 IU/ML
MCH RBC QN AUTO: 27.3 PG (ref 26.5–33)
MCHC RBC AUTO-ENTMCNC: 28.6 G/DL (ref 31.5–36.5)
MCV RBC AUTO: 95 FL (ref 78–100)
PLATELET # BLD AUTO: 356 10E9/L (ref 150–450)
POTASSIUM SERPL-SCNC: 5.2 MMOL/L (ref 3.4–5.3)
RBC # BLD AUTO: 3.48 10E12/L (ref 4.4–5.9)
SODIUM SERPL-SCNC: 134 MMOL/L (ref 133–144)
WBC # BLD AUTO: 13.3 10E9/L (ref 4–11)

## 2020-05-17 PROCEDURE — 93308 TTE F-UP OR LMTD: CPT | Mod: 26 | Performed by: INTERNAL MEDICINE

## 2020-05-17 PROCEDURE — 80048 BASIC METABOLIC PNL TOTAL CA: CPT | Performed by: PHYSICIAN ASSISTANT

## 2020-05-17 PROCEDURE — 25500064 ZZH RX 255 OP 636: Performed by: INTERNAL MEDICINE

## 2020-05-17 PROCEDURE — 85520 HEPARIN ASSAY: CPT | Performed by: THORACIC SURGERY (CARDIOTHORACIC VASCULAR SURGERY)

## 2020-05-17 PROCEDURE — 36415 COLL VENOUS BLD VENIPUNCTURE: CPT

## 2020-05-17 PROCEDURE — 25000132 ZZH RX MED GY IP 250 OP 250 PS 637: Mod: GY | Performed by: STUDENT IN AN ORGANIZED HEALTH CARE EDUCATION/TRAINING PROGRAM

## 2020-05-17 PROCEDURE — 21400000 ZZH R&B CCU UMMC

## 2020-05-17 PROCEDURE — 85027 COMPLETE CBC AUTOMATED: CPT | Performed by: PHYSICIAN ASSISTANT

## 2020-05-17 PROCEDURE — 40000558 ZZH STATISTIC CVC DRESSING CHANGE

## 2020-05-17 PROCEDURE — 85520 HEPARIN ASSAY: CPT | Performed by: PHYSICIAN ASSISTANT

## 2020-05-17 PROCEDURE — 25000132 ZZH RX MED GY IP 250 OP 250 PS 637: Mod: GY | Performed by: PHYSICIAN ASSISTANT

## 2020-05-17 PROCEDURE — 83605 ASSAY OF LACTIC ACID: CPT | Performed by: PHYSICIAN ASSISTANT

## 2020-05-17 PROCEDURE — 93308 TTE F-UP OR LMTD: CPT

## 2020-05-17 PROCEDURE — 25000132 ZZH RX MED GY IP 250 OP 250 PS 637: Mod: GY

## 2020-05-17 PROCEDURE — 25000128 H RX IP 250 OP 636: Performed by: PHYSICIAN ASSISTANT

## 2020-05-17 PROCEDURE — 36415 COLL VENOUS BLD VENIPUNCTURE: CPT | Performed by: PHYSICIAN ASSISTANT

## 2020-05-17 PROCEDURE — 36592 COLLECT BLOOD FROM PICC: CPT | Performed by: THORACIC SURGERY (CARDIOTHORACIC VASCULAR SURGERY)

## 2020-05-17 PROCEDURE — 25000132 ZZH RX MED GY IP 250 OP 250 PS 637: Mod: GY | Performed by: SURGERY

## 2020-05-17 PROCEDURE — 93321 DOPPLER ECHO F-UP/LMTD STD: CPT | Mod: 26 | Performed by: INTERNAL MEDICINE

## 2020-05-17 PROCEDURE — 83605 ASSAY OF LACTIC ACID: CPT

## 2020-05-17 PROCEDURE — 85520 HEPARIN ASSAY: CPT

## 2020-05-17 PROCEDURE — 93325 DOPPLER ECHO COLOR FLOW MAPG: CPT | Mod: 26 | Performed by: INTERNAL MEDICINE

## 2020-05-17 PROCEDURE — 85610 PROTHROMBIN TIME: CPT | Performed by: PHYSICIAN ASSISTANT

## 2020-05-17 PROCEDURE — 97110 THERAPEUTIC EXERCISES: CPT | Mod: GO

## 2020-05-17 PROCEDURE — 25000132 ZZH RX MED GY IP 250 OP 250 PS 637: Mod: GY | Performed by: THORACIC SURGERY (CARDIOTHORACIC VASCULAR SURGERY)

## 2020-05-17 PROCEDURE — 97535 SELF CARE MNGMENT TRAINING: CPT | Mod: GO

## 2020-05-17 RX ORDER — WARFARIN SODIUM 7.5 MG/1
7.5 TABLET ORAL
Status: COMPLETED | OUTPATIENT
Start: 2020-05-17 | End: 2020-05-17

## 2020-05-17 RX ADMIN — FUROSEMIDE 40 MG: 40 TABLET ORAL at 15:27

## 2020-05-17 RX ADMIN — AMOXICILLIN AND CLAVULANATE POTASSIUM 1 TABLET: 875; 125 TABLET, FILM COATED ORAL at 09:04

## 2020-05-17 RX ADMIN — CARVEDILOL 6.25 MG: 6.25 TABLET, FILM COATED ORAL at 17:32

## 2020-05-17 RX ADMIN — POLYETHYLENE GLYCOL 3350 17 G: 17 POWDER, FOR SOLUTION ORAL at 09:04

## 2020-05-17 RX ADMIN — HUMAN ALBUMIN MICROSPHERES AND PERFLUTREN 9 ML: 10; .22 INJECTION, SOLUTION INTRAVENOUS at 12:30

## 2020-05-17 RX ADMIN — WARFARIN SODIUM 7.5 MG: 7.5 TABLET ORAL at 17:32

## 2020-05-17 RX ADMIN — DIGOXIN 125 MCG: 0.06 TABLET ORAL at 09:04

## 2020-05-17 RX ADMIN — AMOXICILLIN AND CLAVULANATE POTASSIUM 1 TABLET: 875; 125 TABLET, FILM COATED ORAL at 20:00

## 2020-05-17 RX ADMIN — CARVEDILOL 6.25 MG: 6.25 TABLET, FILM COATED ORAL at 09:04

## 2020-05-17 RX ADMIN — LOSARTAN POTASSIUM 75 MG: 25 TABLET, FILM COATED ORAL at 09:03

## 2020-05-17 RX ADMIN — HEPARIN SODIUM 1650 UNITS/HR: 10000 INJECTION, SOLUTION INTRAVENOUS at 14:52

## 2020-05-17 RX ADMIN — ATORVASTATIN CALCIUM 40 MG: 40 TABLET, FILM COATED ORAL at 20:00

## 2020-05-17 RX ADMIN — PANTOPRAZOLE SODIUM 40 MG: 40 TABLET, DELAYED RELEASE ORAL at 09:03

## 2020-05-17 RX ADMIN — FUROSEMIDE 40 MG: 40 TABLET ORAL at 09:04

## 2020-05-17 RX ADMIN — ASPIRIN 81 MG: 81 TABLET, COATED ORAL at 09:05

## 2020-05-17 RX ADMIN — HEPARIN SODIUM 1650 UNITS/HR: 10000 INJECTION, SOLUTION INTRAVENOUS at 07:43

## 2020-05-17 ASSESSMENT — ACTIVITIES OF DAILY LIVING (ADL)
ADLS_ACUITY_SCORE: 14
ADLS_ACUITY_SCORE: 14
ADLS_ACUITY_SCORE: 18
ADLS_ACUITY_SCORE: 14
ADLS_ACUITY_SCORE: 18
ADLS_ACUITY_SCORE: 14

## 2020-05-17 ASSESSMENT — MIFFLIN-ST. JEOR: SCORE: 2068.55

## 2020-05-17 NOTE — PROGRESS NOTES
Cardiovascular Surgery Progress Note  05/17/2020         Assessment and Plan:     Rudi Schilling is a 70 year old male with a PMH of morbid obesity, recently diagnosed atrial fibrillation, multivessel CAD, and biventricular HFrEF (EF 10% on 4/21/2020 TTE) who was transferred from an OSH ([place], [place]) on 5/5/2020 for further management and evaluation for possible revascularization. Thallium study from [place] showed viability in all 3 territories.   Upon arrival, patient was noted to be in A. fib with RVR with heart rates in the 140s volume overload 2+ lower extremity edema, and labs suggestive of YAHIR, congestive hepatopathy and hyperkalemia.  Patient was started on IV diuretics, heparin drip, and digoxin for rate control (Eliquis and metoprolol were held at that time).  Patient was continued on IV diuresis and medical optimization with up titration of beta-blockers and afterload reduction.  YAHIR, congestive hepatopathy and hyperkalemia resolved with continued diuresis. He had preliminary limited workup for LVAD/transplant placement as back-up plan.   He is now S/P CABG with bilateral pulmonary vein isolation and TORIBIO ligation 5/8/2020 with Dr Holloway.        Cardiovascular:   ICM with multivessel CAD, now S/P 3 v CABG  Severe biventricular HFrEF, NYHA Class II, CHF Stage D  HTN  Most recent echo (May 6 pre-op) showed LV EF 30-35% with moderate RV dysfunction.   - Aspirin 81 mg daily. Atorvastatin daily  - Coreg 6.25 mg BID (PTA 6.25 mg), Losartan 75 mg daily (last increased 5/15)  - Repeat post-op echo 5/17 for new baseline  Atrial Fibrillation   Pre-op diagnosis, now s/p TORIBIO ligation and pulm vein isolation. Had been on Amio, now just digoxin 125 mg daily. CHADSVASC of 3 (age, vascular disease, and CHF). Anticoagulation with coumadin for 3 months before restarting Eliquis, (PTA was treat with Eliquis).    - Continue coumadin and heparin bridge, most recent INR 1.80       Pulmonary:    Extubated POD 1. Now  stable on RA. Continue Pulm toilet, IS, activity and deep breathing  - Supplemental O2 PRN only to keep sats > 92%.     Neurology / MSK:  - Hx chronic back pain but is stable and controlled without PTA baclofen.   - Neuro intact; slight L sided facial droop that he says is normal for him  - Acute post-operative pain; pain well controlled with acetaminophen, PO oxycodone PRN      / Renal:  Hypervolemia  No Hx of renal disease. Creatinine WNL, adequate UOP on diuretics. Improved dependent edema and JVD. Leg edema worse on left as expected (had saphenous vein harvest). Transition to Lasix 40 mg PO BID per Cardiology 5/15, weight stable on po lasix  - Continue lasix 40 mg PO BID      GI / FEN:   GERD  - 2 gram sodium diet, continue bowel regimen.   - Replace electrolytes as needed, hepatic enzymes WNL.   - Abdominal x-ray 5/16 with moderately dilated loop of large and small bowel, having bowel movements, soft, improving, no abdominal pain or bloating, lactate WNL   - continue to monitor.      Endocrine:  - Stress induced hyperglycemia initially managed on insulin drip postop, transitioned to sliding scale for goal BG <180.     Infectious Disease:  Positive BCx w/ staph hominis, suspected contamination  Pre-operative Fevers - resolved  - No further + blood cultures at our facility.   - Completed perioperative antibiotics.   Sternal drainage/leukocytosis  Treating with Ancef (started 5/14) while inpt, WBC 13.3 and trending down, remains afebrile, no signs or symptoms of infection. Low risk of infection and on ancef for past 24 hrs. CXR with mild increase in retrocardiac opacity, likely atelectasis, procal low risk of bacterial infection, UA unremarkable.   - Continue PO augmentin today for sternal drainage     Hematology:   - Stable aute blood loss anemia; Hgb stable  - Plt WNL, no signs or symptoms of active bleeding     Prophylaxis:   - Stress ulcer prophylaxis: Pantoprazole 40 mg daily for 30 days  - DVT prophylaxis:  "SCD     Disposition:   - Transferred to  on 5/9   - Rehab currently recommending discharge to TCU but potentially home with assist   - He needs follow up with [facility] in 1-2 weeks, [phone].    - Await therapeutic INR. Coumadin for 3 months (goal 2-3) for A-fib before restarting Eliquis       Discussed with CVTS Fellow as needed.  Staff surgeons have been informed of changes through both verbal and written communication.      Mando Velazco PA-C  Cardiothoracic Surgery  p: [phone]            Interval History:   No overnight events.  Feeling stronger overall.   States pain is well managed on current regimen. Slept well overnight.  Tolerating diet, is passing flatus, + well formed BM. Denies any abdominal pain, bloating. No nausea or vomiting.  Breathing well without complaints.   Working with therapies and ambulating in halls without assistance.   Denies chest pain, palpitations, dizziness, syncopal symptoms, fevers, chills, myalgias, or sternal popping/clicking.         Physical Exam:   Blood pressure 118/69, pulse 105, temperature 98.8  F (37.1  C), temperature source Oral, resp. rate 18, height 1.778 m (5' 10\"), weight 129.2 kg (284 lb 14.4 oz), SpO2 94 %.  Vitals:    05/15/20 0548 05/16/20 0338 05/17/20 0005   Weight: 128.9 kg (284 lb 3.2 oz) 128.6 kg (283 lb 8 oz) 129.2 kg (284 lb 14.4 oz)      Gen: A&Ox4, NAD  Neuro: no focal deficits   CV: irregular, normal S1 S2, no murmurs, rubs or gallops.   Pulm: CTA, no wheezing or rhonchi, normal breathing on RA  Abd: nondistended, normal BS, soft, nontender  Ext: trace L periperal edema, non-pitting  Incision: clean, dry, intact, no erythema, sternum stable  Tubes/drain sites: dressing clean and dry         Data:    Imaging:  reviewed recent imaging, no acute concerns  No results found for this or any previous visit (from the past 24 hour(s)).    Labs:  BMP  Recent Labs   Lab 05/17/20  0500 05/16/20  0444 05/15/20  0532 " 05/14/20  0606    135 134 134   POTASSIUM 5.2 4.7 4.6 4.2   CHLORIDE 100 99 98 99   SIDRA 8.9 8.7 8.8 8.6   CO2 32 31 31 28   BUN 16 17 21 22   CR 0.81 0.80 0.85 0.77   * 125* 114* 148*     CBC  Recent Labs   Lab 05/17/20  0500 05/16/20  0444 05/15/20  0532 05/13/20  0618   WBC 13.3* 13.7* 14.6* 10.0   RBC 3.48* 3.23* 3.38* 3.28*   HGB 9.5* 9.2* 9.6* 9.4*   HCT 33.2* 31.1* 32.0* 30.6*   MCV 95 96 95 93   MCH 27.3 28.5 28.4 28.7   MCHC 28.6* 29.6* 30.0* 30.7*   RDW 14.9 14.8 14.6 14.4    314 348 185     INR  Recent Labs   Lab 05/17/20  0500 05/16/20  0444 05/15/20  0532 05/14/20  0606   INR 1.80* 1.43* 1.29* 1.25*      Liver Function Studies -   Recent Labs   Lab Test 05/13/20  0618   PROTTOTAL 6.8   ALBUMIN 2.6*   BILITOTAL 0.7   ALKPHOS 42   AST 30   ALT 25     GLUCOSE:   Recent Labs   Lab 05/17/20  0500 05/16/20  0444 05/15/20  0532 05/14/20  0606 05/13/20  1110 05/13/20  0805 05/13/20  0618 05/13/20  0411 05/12/20  2132 05/12/20  1701 05/12/20  1321 05/12/20  1230   * 125* 114* 148*  --   --  120*  --   --   --  112*  --    BGM  --   --   --   --  143* 109*  --  119* 138* 106*  --  116*

## 2020-05-17 NOTE — PLAN OF CARE
Discharge Planner OT   Patient plan for discharge: home with HH OT/PT and OP CR  Current status: Pt CGA with FWW for functional transfers and mobility. Pt continues to require cuing to maintain sternal precautions during transfers. Pt ambulating ~300 ft with SBA and FWW. Pt completing pericares with CGA and pt min A with cuing for LB dressing. Pt SBA with set up completing standing ADLs at sink. All VSS on RA.  Barriers to return to prior living situation: deconditioning, cognitive status  Recommendations for discharge: recommend TCU, although pt and family declining TCU - if pt returns home, recommend HH OT/PT, OP CR and increased assist for ADL/IADL completion.  Rationale for recommendations: Pt is below baseline and would benefit from continued skilled therapy to increase activity tolerance and independence with ADLs. Pt requires assist for higher level cognitive IADLs. However pt declining TCU stay, pt to benefit from HH OT/PT and OP CR to maximize cardiopulmonary and functional outcomes.       Entered by: Raysa Matson 05/17/2020 10:08 AM

## 2020-05-17 NOTE — PLAN OF CARE
0700-1930  Patient is a 70 year old male admitted for CABG with a PMH of morbid obesity, recently diagnosed atrial fibrillation, multivessel CAD, and biventricular HFrEF (EF 10%).    Neuro: AOx4  Cardiac: denies cardiac symptoms  Telemetry: Afib, anticoagulated with heparin bridging to coumadin. Goal INR of 1.9  Respiratory: RA, denies SOB  GI/: voiding without difficulty, incontinent at times. LBM 5/17/20  Endocrine: N/A  Diet/Appetite: 2gNa diet, good appetite  Skin: sternal incision and old chest tube sites all open to air with no drainage. Dry skin patch in left groin, cleaned and lotion applied.  LDA: DL PICC saline locked, PIV x1.  Activity: up with 1 assist, walker/GB. Showered this evening.  Pain: denies pain  Plan: Waiting for INR to be within goal to discharge. TCU vs home with assist. Contact CVTS care team with changes or concerns.

## 2020-05-17 NOTE — PLAN OF CARE
D: PMH of [place], recently diagnosed Afib, CAD, and biventricular HFrEF (EF 10% on 4/21/2020 TTE) who was transferred from an OSH ([place], [place]) on 5/5/2020 for further management and evaluation for possible revascularization.  A. fib with RVR , YAHIR, congestive hepatopathy and hyperkalemia.  Patient was started on IV diuretics, heparin drip, and digoxin for rate control .   CABG 3 v  5/8/2020   I: Monitored vitals and assessed pt status.   Changed:  Running: Heparin gtt at 1800, hep 10 therap,recheck in a.m.  PRN:    A: A0x4. VSS, on RA, Afib, Afebrile. Denies any pain,no nausea reported, midline incision healing,no drainage observed. Assist by 1 with walker.      I/O this shift:  In: 300 [P.O.:300]  Out: 150 [Urine:150]    Temp:  [97.9  F (36.6  C)-98.8  F (37.1  C)] 98.8  F (37.1  C)  Pulse:  [105] 105  Heart Rate:  [] 109  Resp:  [16-18] 18  BP: ()/(41-81) 116/81  SpO2:  [93 %-95 %] 95 %      P: Continue to monitor Pt status and report changes to treatment team. Will dc when INR therap,

## 2020-05-17 NOTE — PLAN OF CARE
D:pt exhibiting more independence, walking to bathroom steady gait, stand bye assist  A:pt ambulated once this shift, steady gait  I:promote increase activity  P:per Primary

## 2020-05-18 ENCOUNTER — APPOINTMENT (OUTPATIENT)
Dept: PHYSICAL THERAPY | Facility: CLINIC | Age: 70
DRG: 234 | End: 2020-05-18
Attending: THORACIC SURGERY (CARDIOTHORACIC VASCULAR SURGERY)
Payer: MEDICARE

## 2020-05-18 ENCOUNTER — APPOINTMENT (OUTPATIENT)
Dept: OCCUPATIONAL THERAPY | Facility: CLINIC | Age: 70
DRG: 234 | End: 2020-05-18
Attending: THORACIC SURGERY (CARDIOTHORACIC VASCULAR SURGERY)
Payer: MEDICARE

## 2020-05-18 ENCOUNTER — APPOINTMENT (OUTPATIENT)
Dept: GENERAL RADIOLOGY | Facility: CLINIC | Age: 70
DRG: 234 | End: 2020-05-18
Attending: PHYSICIAN ASSISTANT
Payer: MEDICARE

## 2020-05-18 LAB
ANION GAP SERPL CALCULATED.3IONS-SCNC: 6 MMOL/L (ref 3–14)
BUN SERPL-MCNC: 19 MG/DL (ref 7–30)
CALCIUM SERPL-MCNC: 9.2 MG/DL (ref 8.5–10.1)
CHLORIDE SERPL-SCNC: 98 MMOL/L (ref 94–109)
CO2 SERPL-SCNC: 31 MMOL/L (ref 20–32)
CREAT SERPL-MCNC: 0.85 MG/DL (ref 0.66–1.25)
ERYTHROCYTE [DISTWIDTH] IN BLOOD BY AUTOMATED COUNT: 15 % (ref 10–15)
GFR SERPL CREATININE-BSD FRML MDRD: 90 ML/MIN/{1.73_M2}
GLUCOSE SERPL-MCNC: 110 MG/DL (ref 70–99)
HCT VFR BLD AUTO: 32.6 % (ref 40–53)
HGB BLD-MCNC: 9.7 G/DL (ref 13.3–17.7)
INR PPP: 1.87 (ref 0.86–1.14)
LACTATE BLD-SCNC: 0.9 MMOL/L (ref 0.7–2)
LMWH PPP CHRO-ACNC: 0.24 IU/ML
MCH RBC QN AUTO: 28 PG (ref 26.5–33)
MCHC RBC AUTO-ENTMCNC: 29.8 G/DL (ref 31.5–36.5)
MCV RBC AUTO: 94 FL (ref 78–100)
PLATELET # BLD AUTO: 352 10E9/L (ref 150–450)
POTASSIUM SERPL-SCNC: 5.1 MMOL/L (ref 3.4–5.3)
RBC # BLD AUTO: 3.46 10E12/L (ref 4.4–5.9)
SODIUM SERPL-SCNC: 135 MMOL/L (ref 133–144)
WBC # BLD AUTO: 12.8 10E9/L (ref 4–11)

## 2020-05-18 PROCEDURE — 36415 COLL VENOUS BLD VENIPUNCTURE: CPT | Performed by: PHYSICIAN ASSISTANT

## 2020-05-18 PROCEDURE — 25000132 ZZH RX MED GY IP 250 OP 250 PS 637: Mod: GY | Performed by: PHYSICIAN ASSISTANT

## 2020-05-18 PROCEDURE — 97110 THERAPEUTIC EXERCISES: CPT | Mod: GO | Performed by: OCCUPATIONAL THERAPIST

## 2020-05-18 PROCEDURE — 25000128 H RX IP 250 OP 636: Performed by: PHYSICIAN ASSISTANT

## 2020-05-18 PROCEDURE — 85027 COMPLETE CBC AUTOMATED: CPT | Performed by: PHYSICIAN ASSISTANT

## 2020-05-18 PROCEDURE — 97535 SELF CARE MNGMENT TRAINING: CPT | Mod: GO | Performed by: OCCUPATIONAL THERAPIST

## 2020-05-18 PROCEDURE — 25000132 ZZH RX MED GY IP 250 OP 250 PS 637: Mod: GY | Performed by: THORACIC SURGERY (CARDIOTHORACIC VASCULAR SURGERY)

## 2020-05-18 PROCEDURE — 25000132 ZZH RX MED GY IP 250 OP 250 PS 637: Mod: GY | Performed by: SURGERY

## 2020-05-18 PROCEDURE — 80048 BASIC METABOLIC PNL TOTAL CA: CPT | Performed by: PHYSICIAN ASSISTANT

## 2020-05-18 PROCEDURE — 25000132 ZZH RX MED GY IP 250 OP 250 PS 637: Mod: GY

## 2020-05-18 PROCEDURE — 21400000 ZZH R&B CCU UMMC

## 2020-05-18 PROCEDURE — 36415 COLL VENOUS BLD VENIPUNCTURE: CPT | Performed by: THORACIC SURGERY (CARDIOTHORACIC VASCULAR SURGERY)

## 2020-05-18 PROCEDURE — 97530 THERAPEUTIC ACTIVITIES: CPT | Mod: GP

## 2020-05-18 PROCEDURE — 25000132 ZZH RX MED GY IP 250 OP 250 PS 637: Mod: GY | Performed by: STUDENT IN AN ORGANIZED HEALTH CARE EDUCATION/TRAINING PROGRAM

## 2020-05-18 PROCEDURE — 83605 ASSAY OF LACTIC ACID: CPT | Performed by: THORACIC SURGERY (CARDIOTHORACIC VASCULAR SURGERY)

## 2020-05-18 PROCEDURE — 71046 X-RAY EXAM CHEST 2 VIEWS: CPT

## 2020-05-18 PROCEDURE — 85520 HEPARIN ASSAY: CPT | Performed by: PHYSICIAN ASSISTANT

## 2020-05-18 PROCEDURE — 85610 PROTHROMBIN TIME: CPT | Performed by: PHYSICIAN ASSISTANT

## 2020-05-18 RX ORDER — WARFARIN SODIUM 10 MG/1
10 TABLET ORAL
Status: COMPLETED | OUTPATIENT
Start: 2020-05-18 | End: 2020-05-18

## 2020-05-18 RX ADMIN — PANTOPRAZOLE SODIUM 40 MG: 40 TABLET, DELAYED RELEASE ORAL at 08:39

## 2020-05-18 RX ADMIN — ATORVASTATIN CALCIUM 40 MG: 40 TABLET, FILM COATED ORAL at 19:43

## 2020-05-18 RX ADMIN — DIGOXIN 125 MCG: 0.06 TABLET ORAL at 08:40

## 2020-05-18 RX ADMIN — AMOXICILLIN AND CLAVULANATE POTASSIUM 1 TABLET: 875; 125 TABLET, FILM COATED ORAL at 19:43

## 2020-05-18 RX ADMIN — FUROSEMIDE 40 MG: 40 TABLET ORAL at 16:06

## 2020-05-18 RX ADMIN — AMOXICILLIN AND CLAVULANATE POTASSIUM 1 TABLET: 875; 125 TABLET, FILM COATED ORAL at 08:39

## 2020-05-18 RX ADMIN — CARVEDILOL 6.25 MG: 6.25 TABLET, FILM COATED ORAL at 17:26

## 2020-05-18 RX ADMIN — HEPARIN SODIUM 1800 UNITS/HR: 10000 INJECTION, SOLUTION INTRAVENOUS at 14:19

## 2020-05-18 RX ADMIN — ASPIRIN 81 MG: 81 TABLET, COATED ORAL at 09:06

## 2020-05-18 RX ADMIN — WARFARIN SODIUM 10 MG: 10 TABLET ORAL at 17:26

## 2020-05-18 RX ADMIN — HEPARIN SODIUM 1800 UNITS/HR: 10000 INJECTION, SOLUTION INTRAVENOUS at 02:00

## 2020-05-18 RX ADMIN — CARVEDILOL 6.25 MG: 6.25 TABLET, FILM COATED ORAL at 08:39

## 2020-05-18 RX ADMIN — LOSARTAN POTASSIUM 75 MG: 25 TABLET, FILM COATED ORAL at 08:38

## 2020-05-18 RX ADMIN — FUROSEMIDE 40 MG: 40 TABLET ORAL at 08:39

## 2020-05-18 ASSESSMENT — MIFFLIN-ST. JEOR: SCORE: 2022.73

## 2020-05-18 ASSESSMENT — ACTIVITIES OF DAILY LIVING (ADL)
ADLS_ACUITY_SCORE: 14

## 2020-05-18 NOTE — PROGRESS NOTES
ICU End of Shift Summary. See flowsheets for vital signs and detailed assessment.    Changes this shift:     Neuro: alert, oriented and call light appropriate     Cardiac: MAP briefly < 65 though asymptomatic. MAP > 65 on recheck after ambulation to bathroom. Encouraged more PO fluids, MAP > 65 on all subsequent rechecks. Bridging Heparin to Coumadin.    Respiratory: dyspneic with exertion, lung sounds clear.    GI: 4 soft bowel movements, continent. Consumed 100% of all meals.     : 825ml recorded clear, yellow urine out this shift. Undocumented urine x 3 with bowel movements.    Skin: ecchymotic area to posterior left thigh expanding with gravity, soft, nontender. Midline line incision closed with liquid bandage.     Activity: Stand-by assist with walker.    Plan: heparin 10A therapeutic this morning, next recheck with AM labs. Discharge 5/19 home with family pending therapeutic INR.

## 2020-05-18 NOTE — DISCHARGE SUMMARY
[facility], [place]   [facility] Discharge Summary     Rudi Schilling MRN# [id number]   Age: 70 year old YOB: 1950     Admitting Physician:  Yobany Holloway MD  Discharge Physician:  ABDOUL Pisano  Primary Care Physician:        No Ref-Primary, Physician     DATE OF ADMISSION: 5/5/2020      DATE OF DISCHARGE: May 19, 2020          Primary Diagnoses:   1. CAD with ICM, s/p 3 vessel CAB   2. Chronic A-fibrillation, s/p left atrial appendage ligation and bilateral pulmonary vein isolation   3. Chronic anticoagulation for A-fib, currently on warfarin for 3 months post op, INR goal 2-3.   4. Severe chronic biventricular HFrEF, NYHA CLASS II, CHF STAGE D  5. Renea-operative fevers and leukocytosis, resolved  6. Sputum culture with Light growth Pseudomonas fluorescens putida group, asymptomatic, ID recommending to observe for now due to lack of pneumonia symptoms  7. Sternal drainage: covered with 7 days of antibiotics prophylaxis due           Secondary Diagnoses:   1. Morbid obesity  2. GERD     PROCEDURES PERFORMED:   Date: 5/8/20.  Surgeon: Dr. Yobany Holloway   1. Coronary artery bypass grafting x 3.       - Left internal mammary artery to left anterior descending artery       - Reversed saphenous vein graft to right posterior descending artery       - Reversed saphenous vein graft to obtuse marginal artery  2. Endoscopic vein harvest left lower extremity  3. Bilateral pulmonary vein isolation  4. Left atrial appendage ligation - 45mm AtriClip  5. Transesophageal echocardiogram    OPERATIVE FINDINGS:  1. Ejection fraction: 35%  2. Left internal mammary artery 3mm and pulsatile flow.  3. Left greater saphenous vein 4-5mm and suitable for bypass.  4. Left anterior descending artery 2mm and free of disease at anastomosis.  5. Right posterior descending artery 1.25mm and free of disease at anastomosis.  6. Obtuse marginal artery 2mm and  free of disease at anastomosis.  7. Diagonal artery less than 1mm and not suitable for bypass.    INTRAOPERATIVE COMPLICATIONS:  none    PATHOLOGY RESULTS:  none     CULTURE RESULTS:    1. Sputum 5/15/20; light growth pseudomonas fluorescens putida group.   2. No other positive cultures of blood or urine     CONSULTS:    1.  PT/OT  2.  [facility] heart failure clinic  3.  Dental Team   4.  Cardiology Team     BRIEF HISTORY OF ILLNESS:  Rudi Schilling is a 70 year old male with a PMH of morbid obesity, recently diagnosed atrial fibrillation, multivessel CAD, and biventricular HFrEF (EF 10% on 4/21/2020 TTE) who was transferred from an OSH ([place], [place]) on 5/5/20 for further management and evaluation for possible revascularization. Thallium study from [place] showed viability in all 3 territories.   Upon arrival, patient was noted to be in A. fib with RVR with heart rates in the 140s volume overload 2+ lower extremity edema, and labs suggestive of YAHIR, congestive hepatopathy and hyperkalemia.  Patient was started on IV diuretics, heparin drip, and digoxin for rate control (Eliquis and metoprolol were held at that time).  Patient was continued on IV diuresis and medical optimization with up titration of beta-blockers and afterload reduction.  YAHIR, congestive hepatopathy and hyperkalemia resolved with continued diuresis. He had preliminary limited workup for LVAD and transplant placement as back-up plan.   He was scheduled for semi-elective CABG with bilateral pulmonary vein isolation and TORIBIO ligation 5/8/2020 with Dr Holloway.     HOSPITAL COURSE: Rudi Schilling is a 70 year old male who on 5/8/2020 underwent the above-named procedures.  He tolerated the operation well and postoperatively was admitted to the CVICU.  His IABP was removed on evening of his surgery without complication. He was extubated on POD # 1 at 5 am to 2 lpm via NC with neuro status intact.  His ICU stay was uncomplicated and treatment included  the usual hemodynamic support and weaning of pressors. He was transferred to the post-surgical telemetry unit on POD # 1.      Cardiovascular:   ICM with multivessel CAD, now S/P 3 v CABG  Severe biventricular HFrEF, NYHA Class II, CHF Stage D  HTN  The most recent echo (May 17, post-op) showed LV EF 35-40% with mild-moderate RV dysfunction, this was improved since prior Echo.  He continued on Aspirin 81 mg daily and Atorvastatin daily. Cardiology helped titrate his BP meds, and was discharged on Coreg 6.25 mg BID (PTA dose) and Losartan 75 mg daily (last increased 5/15).    Atrial Fibrillation   Pre-op diagnosis, now s/p TORIBIO ligation and pulm vein isolation. Had been on Amio, but now just on digoxin 125 mg daily per Cardiology. He has a CHADSVASC of 3 (age, vascular disease, and CHF), so will continue with anticoagulation with coumadin for 3 months before restarting his prior Eliquis dosing per PCP. He was bridged to therapeutic INR with heparin bridge, on coumadin, most recent INR 2.29.       Pulmonary:    He was extubated POD 1 and stable on RA well before discharge. Continue Pulm toilet, IS, activity and deep breathing.       Neurology / MSK:  Patient has a Hx of chronic back pain but is stable and controlled without PTA baclofen. Overall remained neurologically intact; noticed slight L sided facial droop but patient says this is normal for him.  His acute post-operative pain was well controlled with PRN acetaminophen at discharge.        / Renal:  Hypervolemia  No Hx of renal disease. Creatinine WNL, adequate UOP on diuretics. Improved dependent edema and JVD. Leg edema worse on left as expected (had saphenous vein harvest). Transition to Lasix 40 mg PO BID per Cardiology 5/15, weight stable on po lasix prior to discharge.      GI / FEN:   Hx GERD  He tolerated his diet well. Due to hypervolemia, he was started on a 2 gram sodium diet. His electrolytes were replaced as needed, hepatic enzymes normalized after  surgery.  Abdominal x-ray 5/16 showed with moderately dilated loop of large and small bowels. He was having frequent bowel movements that were soft, no abdominal pain or bloating, lactate WNL.       Endocrine:  He had expected stress induced hyperglycemia initially managed on insulin drip postop, later transitioned to sliding scale for goal BG <180, then off of insulin before discharge.     Infectious Disease:  Positive BCx w/ staph hominis, suspected contamination  Pre-operative Fevers   His post-operative fevers resolved after surgery. He had no + blood cultures at our facility.   Sternal drainage/leukocytosis  WBC 13.8 at discharge, remains afebrile, no signs or symptoms of infection. Low risk of infection but started on ancef for sternal drainage. CXR with mild increase in retrocardiac opacity, likely atelectasis, procal low risk of bacterial infection, UA unremarkable. Switched to augmentin for sternal drainage (for 7 days, ending 5/22).    Sputum culture with Light growth Pseudomonas fluorescens putida group  Sputum resulted on day of discharge. Spoke with ID, recommending to observe for now due to lack of pneumonia symptoms.     Hematology:   He had stable acute blood loss anemia; Hgb stable (9's), as expected. His PLTs improved to later be WNL as expected. He had no signs or symptoms of active bleeding.      Prophylaxis:   Stress ulcer prophylaxis: Pantoprazole 40 mg daily for 30 days      Disposition:   - Transferred to  on 5/9   - Rehab currently recommending discharge to TCU but patient wanted to go home with assist. Discharging to his brother's house, Rehab teams recommending pt to discharge to home with 24hr assist from family; obtain walker and shower chair, and home OT/PT progressing to OP CR as able.    - [facility] in 1-2 weeks, [phone].      Prior to discharge, his pain was controlled well, he was able to perform most ADLs and ambulate  "without difficulty, and had full return of bowel and bladder function.  On May 19, 2020, he was discharged to home in stable condition.    Patient discharged on aspirin:  Yes 81 mg  Patient discharged on beta blocker:  Yes  Coreg    Patient discharged on ACE Inhibitor/ARB: yes  Losartan            Discharge Disposition:     Discharged to home            Condition on Discharge:     Discharge condition: Stable   Discharge vitals: Blood pressure 96/61, pulse 85, temperature 98.5  F (36.9  C), temperature source Oral, resp. rate 18, height 1.778 m (5' 10\"), weight 121.9 kg (268 lb 11.2 oz), SpO2 94 %.     Code status on discharge: Full Code     Vitals:    05/17/20 0005 05/18/20 0120 05/19/20 0033   Weight: 129.2 kg (284 lb 14.4 oz) 124.6 kg (274 lb 12.8 oz) 121.9 kg (268 lb 11.2 oz)       DAY OF DISCHARGE PHYSICAL EXAM:    MAPs: 69 - 75   Gen:  NAD, conversational  CV: irregular, S1S2 normal, no murmurs, rubs, or gallops  Pulm:  CTA, no rhonchi or wheezes  Abd: obese, soft, nondistended, NTTP  Ext: trace dependent edema, non-pitting  Incision: overall clean, dry, intact, no erythema, inferior portion dry  Chest Tube sites: scabs clean and dry      BMP  Recent Labs   Lab 05/19/20  0648 05/18/20  0629 05/17/20  0500 05/16/20  0444    135 134 135   POTASSIUM 5.1 5.1 5.2 4.7   CHLORIDE 99 98 100 99   SIDRA 8.8 9.2 8.9 8.7   CO2 32 31 32 31   BUN 20 19 16 17   CR 0.79 0.85 0.81 0.80   * 110* 110* 125*     CBC  Recent Labs   Lab 05/19/20  0648 05/18/20  0629 05/17/20  0500 05/16/20  0444   WBC 13.8* 12.8* 13.3* 13.7*   RBC 3.43* 3.46* 3.48* 3.23*   HGB 9.6* 9.7* 9.5* 9.2*   HCT 32.9* 32.6* 33.2* 31.1*   MCV 96 94 95 96   MCH 28.0 28.0 27.3 28.5   MCHC 29.2* 29.8* 28.6* 29.6*   RDW 15.2* 15.0 14.9 14.8    352 356 314     INR  Recent Labs   Lab 05/19/20  0648 05/18/20  0629 05/17/20  0500 05/16/20  0444   INR 2.29* 1.87* 1.80* 1.43*      Liver Function Studies -   Recent Labs   Lab Test 05/13/20  0618 "   PROTTOTAL 6.8   ALBUMIN 2.6*   BILITOTAL 0.7   ALKPHOS 42   AST 30   ALT 25     Recent Labs   Lab 05/19/20  0648 05/18/20  0629 05/17/20  0500 05/16/20  0444 05/15/20  0532 05/14/20  0606 05/13/20  1110 05/13/20  0805  05/13/20  0411 05/12/20  2132 05/12/20  1701  05/12/20  1230   * 110* 110* 125* 114* 148*  --   --    < >  --   --   --    < >  --    BGM  --   --   --   --   --   --  143* 109*  --  119* 138* 106*  --  116*    < > = values in this interval not displayed.       ECHOCARDIOGRAM, 5/17/2020-   Limited Portable Echo Adult. Contrast Optison. Technically difficult study.Extremely difficult  acoustic windows despite the use of contrast for endcardial border definition. Patient was given  9 ml mixture of 3 ml Optison and 6 ml saline. 0 ml wasted.  ________________________________________________________  Interpretation Summary  Technically difficult study.Extremely difficult acoustic windows despite the use of contrast for  endcardial border definition.  Moderately (EF 35-40%) reduced left ventricular function is present. Moderate diffuse  hypokinesis is present.  Global right ventricular function is mildly to moderately reduced. No pericardial effusion is present.  This study was compared with the study from 5/6/20. LV function appears similar.  _________________________________________________________________________  Left Ventricle-  LVEF 37% based on biplane 2D tracing. Moderately (EF 35-40%) reduced left  ventricular function is present. Moderate diffuse hypokinesis is present.     Right Ventricle-  Global right ventricular function is mildly to moderately reduced.     Atria-  Moderate biatrial enlargement is present.     Mitral Valve-  The mitral valve is normal.     Aortic Valve-  Aortic valve is normal in structure and function.     Tricuspid Valve-  Trace tricuspid insufficiency is present. Estimated pulmonary artery systolic  pressure is 25 mmHg plus right atrial pressure.     Pulmonic  Valve-  The valve leaflets are not well visualized. On Doppler interrogation, there is  no significant stenosis or regurgitation.     Vessels-  The aorta root cannot be assessed. The thoracic aorta cannot be assessed. The  inferior vena cava cannot be assessed.     Pericardium-  No pericardial effusion is present.     Compared to Previous Study-  This study was compared with the study from 20.  _________________________________________________________________  MMode/2D Measurements & Calculations     IVSd: 1.5 cm  LVIDd: 6.0 cm  LVIDs: 5.2 cm  LVPWd: 1.4 cm  FS: 13.8 %  LV mass(C)d: 407.5 grams  LV mass(C)dI: 168.5 grams/m2     EF(MOD-bp): 36.7 %  RWT: 0.48  TAPSE: 1.1 cm     Doppler Measurements & Calculations  TV max P.5 mmHg  TR max radha: 257.5 cm/sec  TR max P.5 mmHg    CXR 2020-   Heart remains mildly enlarged. Left atrial appendage clipping an CABG again noted with  median sternotomy. Right upper extremity PICC line is noted with tip in the proximal right  atrium. Right lower lung subsegmental atelectasis is unchanged. Small left pleural effusion and  associated lung base atelectasis appears slightly improved.    IMPRESSION: Cardiomegaly. Slight decrease in left pleural effusion and associated lung base  atelectasis. Unchanged subsegmental atelectasis in right lower lung. CABG. Left atrial  appendage clipping.    DISCHARGE INSTRUCTIONS:  You had a sternotomy, avoid lifting anything greater than ten pounds for 6 weeks after surgery and then less than 20 pounds for an additional 6 weeks. Do not reach backwards or use arms to push out of chair. Do not let people pull on your arms to assist with standing. Avoid twisting or reaching too far across your body.  Avoid strenuous activities such as bowling, vacuuming, raking, shoveling, golf or tennis for 12 weeks after your surgery. It is okay to resume sex if you feel comfortable in doing so. You may have to try different positions with your partner.   Splint your chest incision by hugging a pillow or bringing your arms across your chest when coughing or sneezing.     No driving for 4 weeks after surgery or while on pain medication.     Shower or wash your incisions daily with soap and water (or as instructed), pat dry. Keep wound clean and dry, showers are okay after discharge, but don't let spray hit directly on incision. No baths or swimming for 1 month. Cover chest tube sites with gauze until they stop draining, then leave open to air. It is not abnormal for chest tube sites to drain yellowish/clear fluid for up to 2-3 weeks after surgery.   Watch for signs of infection: increased redness, tenderness, warmth or any drainage that appears infected (pus like) or is persistent.  Also a temperature > 100.5 F or chills. Call your surgeon or primary care provider's office immediately. Remove any skin glue left on incisions after 10-14 days. This will not affect your incision and can speed up healing.    Exercise is very important in your recovery. Please follow the guidelines set up for you in your cardiac rehab classes at the hospital. If outpatient cardiac rehab was ordered for you, we highly recommend you participate. If you have problems arranging your cardiac rehab, please call [phone] for all locations, with the exception of [place], please call [phone] and [place], please call [phone].    Avoid sitting for prolonged periods of time, try to walk every hour during the day. If you have a leg incision, elevate your leg often when you are not walking.    Check your weight when you get home from the hospital and continue to check it daily through your recovery for at least a month. If you notice a weight gain of 2-3 pounds in a week, notify your primary care physician, cardiologist or surgeon.    Bowel activity may be slow after surgery. If necessary, you may take an over the counter laxative such as Milk of Magnesia or Miralax. You may have stool  softeners prescribed (docusate sodium, Senokot). We recommend using stool softeners while using narcotics for pain (oxycodone/percocet, hydrocodone/vicodin, hydromorphone/dilaudid).      Wean OFF of narcotics (oxycodone, dilaudid, hydrocodone) as soon as possible. You should continue taking acetaminophen as long as you have any surgical pain as the first choice for pain control and add narcotics as necessary for pain to be tolerable.      DO NOT SMOKE.  IF YOU NEED HELP QUITTING, PLEASE TALK WITH YOUR CARDIOLOGIST OR PRIMARY DOCTOR.    You are on a blood thinner (warfarin, coumadin, jantuven), follow the instructions you were given in the hospital and DO NOT SKIP this medication. Try and take it the same time everyday. Your primary care physician or coumadin clinic will manage the dosing. INR goal is 2-3 for atrial fibrillation, ok to restart Eliquis 3 months after surgery.      REGARDING PRESCRIPTION REFILLS.  If you need a refill on your pain medication contact us to discuss your pain and a possible one time refill.   All other medications will be adjusted, discontinued and re-filled by your primary care physician and/or your cardiologist as they were prior to your surgery. We have given you enough for one to three month with possibly one refill.    POST-OPERATIVE CLINIC VISITS  You have a follow up visit with [facility] Practitioners on 5/26/20 at 2 pm for a Telemedicine Video Visit.  You will then return to the care of your primary provider and your cardiologist. Future medication refills should come from your PCP or Cardiologist.    You should see your primary care provider on Wed 5/20 for INR check.    It is important to see your [facility]ologist team about 2 weeks after discharge. You need to [facility] up with [facility] in 1-2 weeks, [phone].      PRE-ADMISSION MEDICATIONS:  No current facility-administered medications on file prior to encounter.   No  current outpatient medications on file prior to encounter.     DISCHARGE MEDICATIONS:    Rudi Schilling   Home Medication Instructions JASEN:[id number]    Printed on:05/19/20 1341   Medication Information                      acetaminophen (TYLENOL) 325 MG tablet  Take 2 tablets (650 mg) by mouth every 6 hours as needed for mild pain or fever             amoxicillin-clavulanate (AUGMENTIN) 875-125 MG tablet  Take 1 tablet by mouth every 12 hours for 3 days then stop             aspirin (ASA) 81 MG EC tablet  Take 1 tablet (81 mg) by mouth daily             atorvastatin (LIPITOR) 40 MG tablet  Take 1 tablet (40 mg) by mouth At Bedtime             carvedilol (COREG) 6.25 MG tablet  Take 1 tablet (6.25 mg) by mouth 2 times daily (with meals)             digoxin (LANOXIN) 125 MCG tablet  Take 1 tablet (125 mcg) by mouth daily             furosemide (LASIX) 40 MG tablet  Take 1 tablet (40 mg) by mouth 2 times daily             losartan (COZAAR) 25 MG tablet  Take 3 tablets (75 mg) by mouth daily             pantoprazole (PROTONIX) 40 MG EC tablet  Take 1 tablet (40 mg) by mouth every morning (before breakfast) then stop             warfarin ANTICOAGULANT (COUMADIN) 2.5 MG tablet  Take 7.5 mg (3 tabs) PO daily at 6 pm until next INR draw, then dose per INR goal 2-3                 CC:s  No Ref-Primary, Physician  Isis [facility] ([facility])       [facility]   Cardiothoracic Surgery  Office phone: [phone]  Office fax: [phone]

## 2020-05-18 NOTE — PLAN OF CARE
D: PMH of [place], recently diagnosed Afib, CAD, and biventricular HFrEF (EF 10% on 4/21/2020 TTE) who was transferred from an OSH ([place], [place]) on 5/5/2020 for further management and evaluation for possible revascularization.  A. fib with RVR , YAHIR, congestive hepatopathy and hyperkalemia.  Patient was started on IV diuretics, heparin drip, and digoxin for rate control .   CABG  X 3 v  5/8/2020   I: Monitored vitals and assessed pt status.   Changed:  Running: Heparin gtt at 1800,hep 10 a therap.  PRN:    A: A0x4. VSS, on RA. Afib.  Afebrile. Midline incision healing,open to air.       Temp:  [98  F (36.7  C)-99.1  F (37.3  C)] 98.9  F (37.2  C)  Heart Rate:  [] 105  Resp:  [18-20] 20  BP: ()/(51-73) 96/73  SpO2:  [90 %-94 %] 90 %      P: Continue to monitor Pt status and report changes to treatment team. Will dc when INR  >1.9. TCU vs home with assist.

## 2020-05-18 NOTE — PROGRESS NOTES
Cardiovascular Surgery Progress Note  5/18/2020         Assessment and Plan:     Rudi Schilling is a 70 year old male with a PMH of morbid obesity, recently diagnosed atrial fibrillation, multivessel CAD, and biventricular HFrEF (EF 10% on 4/21/2020 TTE) who was transferred from an OSH ([place], [place]) on 5/5/2020 for further management and evaluation for possible revascularization. Thallium study from [facility] showed viability in all 3 territories.   Upon arrival, patient was noted to be in A. fib with RVR with heart rates in the 140s volume overload 2+ lower extremity edema, and labs suggestive of YAHIR, congestive hepatopathy and hyperkalemia.  Patient was started on IV diuretics, heparin drip, and digoxin for rate control (Eliquis and metoprolol were held at that time).  Patient was continued on IV diuresis and medical optimization with up titration of beta-blockers and afterload reduction.  YAHIR, congestive hepatopathy and hyperkalemia resolved with continued diuresis. He had preliminary limited workup for LVAD/transplant placement as back-up plan.   He is now S/P CABG with bilateral pulmonary vein isolation and TORIBIO ligation 5/8/2020 with Dr Holloway.        Cardiovascular:   ICM with multivessel CAD, now S/P 3 v CABG  Severe biventricular HFrEF, NYHA Class II, CHF Stage D  HTN  Most recent echo (May 17 post-op) showed LV EF 35-40% with mild-moderate RV dysfunction.   - Aspirin 81 mg daily. Atorvastatin daily  - Coreg 6.25 mg BID (PTA 6.25 mg), Losartan 75 mg daily (last increased 5/15)  - Repeat post-op echo 5/17 for new baseline  Atrial Fibrillation   Pre-op diagnosis, now s/p TORIBIO ligation and pulm vein isolation. Had been on Amio, now just digoxin 125 mg daily. CHADSVASC of 3 (age, vascular disease, and CHF). Anticoagulation with coumadin for 3 months before restarting Eliquis, (PTA was treat with Eliquis).    - Continue coumadin and heparin bridge, most recent INR 1.87       Pulmonary:    - Extubated  POD 1. Now stable on RA. Continue Pulm toilet, IS, activity and deep breathing.    - Supplemental O2 PRN only to keep sats > 92%.     Neurology / MSK:  - Hx chronic back pain but is stable and controlled without PTA baclofen.   - Neuro intact; slight L sided facial droop that he says is normal for him  - Acute post-operative pain; pain well controlled with acetaminophen, PO oxycodone PRN      / Renal:  Hypervolemia  No Hx of renal disease. Creatinine WNL, adequate UOP on diuretics. Improved dependent edema and JVD. Leg edema worse on left as expected (had saphenous vein harvest). Transition to Lasix 40 mg PO BID per Cardiology 5/15, weight stable on po lasix  - Continue lasix 40 mg PO BID      GI / FEN:   GERD  - 2 gram sodium diet, continue bowel regimen.   - Replace electrolytes as needed, hepatic enzymes WNL.   - Abdominal x-ray 5/16 with moderately dilated loop of large and small bowel, having bowel movements, soft, improving, no abdominal pain or bloating, lactate WNL   - continue to monitor.      Endocrine:  - Stress induced hyperglycemia initially managed on insulin drip postop, transitioned to sliding scale for goal BG <180.     Infectious Disease:  Positive BCx w/ staph hominis, suspected contamination  Pre-operative Fevers - resolved  - No further + blood cultures at our facility.   - Completed perioperative antibiotics.   Sternal drainage/leukocytosis  Treating with Ancef (started 5/14) while inpt, WBC 12.8 and trending down, remains afebrile, no signs or symptoms of infection. Low risk of infection and on ancef for past 24 hrs. CXR with mild increase in retrocardiac opacity, likely atelectasis, procal low risk of bacterial infection, UA unremarkable.   - Continue PO augmentin today for sternal drainage.       Hematology:   - Stable aute blood loss anemia; Hgb stable  - Plt WNL, no signs or symptoms of active bleeding     Prophylaxis:   - Stress ulcer prophylaxis: Pantoprazole 40 mg daily for 30  "days  - DVT prophylaxis: SCD     Disposition:   - Transferred to  on 5/9   - Rehab currently recommending discharge to TCU but potentially home with assist   - [facility] in 1-2 weeks, [phone].    - Await therapeutic INR. Coumadin for 3 months (goal 2-3) for A-fib before restarting Eliquis   - Planning discharge tomorrow (5/19) to home    Discussed with CVTS Fellow as needed.  Staff surgeons have been informed of changes through both verbal and written communication.      Rodríguez Soriano PA-C  Cardiothoracic Surgery  Pager [phone]    11:15 AM   May 18, 2020          Interval History:     No overnight events.  States pain is well managed on current regimen. Slept well  overnight.  Tolerating diet, is passing flatus, + BM. No nausea or vomiting.  Breathing well without complaints.   Working with therapies and ambulating in halls with assistance.   Denies chest pain, palpitations, dizziness, syncopal symptoms, fevers, chills, myalgias, or sternal popping/clicking.         Physical Exam:   Blood pressure (!) 78/49, pulse 105, temperature 98.2  F (36.8  C), temperature source Oral, resp. rate 20, height 1.778 m (5' 10\"), weight 124.6 kg (274 lb 12.8 oz), SpO2 94 %.  Vitals:    05/16/20 0338 05/17/20 0005 05/18/20 0120   Weight: 128.6 kg (283 lb 8 oz) 129.2 kg (284 lb 14.4 oz) 124.6 kg (274 lb 12.8 oz)      Weight; - 5.4 kg since admit and trending down.   24 hr Fluid status; net gain 700 mL with 3 unrecorded UOP.   MAPs: 57 - 87     Gen: A&Ox4, NAD  Neuro: no focal deficits   CV: irregular, normal S1 S2, no murmurs, rubs or gallops.   Pulm: CTA, no wheezing or rhonchi, normal breathing on RA  Abd: nondistended, normal BS, soft, nontender  Ext: trace periperal edema, non-pitting  Incision: clean, dry, intact, no erythema, sternum stable  Tubes/drain sites: dressing clean and dry         Data:    Imaging:  reviewed recent imaging, no acute " concerns    Labs:  BMP  Recent Labs   Lab 05/18/20  0629 05/17/20  0500 05/16/20  0444 05/15/20  0532    134 135 134   POTASSIUM 5.1 5.2 4.7 4.6   CHLORIDE 98 100 99 98   SIDRA 9.2 8.9 8.7 8.8   CO2 31 32 31 31   BUN 19 16 17 21   CR 0.85 0.81 0.80 0.85   * 110* 125* 114*     CBC  Recent Labs   Lab 05/18/20  0629 05/17/20  0500 05/16/20  0444 05/15/20  0532   WBC 12.8* 13.3* 13.7* 14.6*   RBC 3.46* 3.48* 3.23* 3.38*   HGB 9.7* 9.5* 9.2* 9.6*   HCT 32.6* 33.2* 31.1* 32.0*   MCV 94 95 96 95   MCH 28.0 27.3 28.5 28.4   MCHC 29.8* 28.6* 29.6* 30.0*   RDW 15.0 14.9 14.8 14.6    356 314 348     INR  Recent Labs   Lab 05/18/20  0629 05/17/20  0500 05/16/20  0444 05/15/20  0532   INR 1.87* 1.80* 1.43* 1.29*      Liver Function Studies -   Recent Labs   Lab Test 05/13/20  0618   PROTTOTAL 6.8   ALBUMIN 2.6*   BILITOTAL 0.7   ALKPHOS 42   AST 30   ALT 25     GLUCOSE:   Recent Labs   Lab 05/18/20  0629 05/17/20  0500 05/16/20  0444 05/15/20  0532 05/14/20  0606 05/13/20  1110 05/13/20  0805 05/13/20  0618 05/13/20  0411 05/12/20  2132 05/12/20  1701  05/12/20  1230   * 110* 125* 114* 148*  --   --  120*  --   --   --    < >  --    BGM  --   --   --   --   --  143* 109*  --  119* 138* 106*  --  116*    < > = values in this interval not displayed.

## 2020-05-18 NOTE — PLAN OF CARE
6C PT  Discharge Planner PT   Patient plan for discharge: Home, OP CR  Current status: Pt declines OOB activity despite encouragement 2/2 just returning to bed from walking. Facilitated discussion to promote safe discharge planning, discussed AD/AE options.  Barriers to return to prior living situation: Medical status, sternal precautions, decreased activity tolerance, weakness  Recommendations for discharge: TCU   Rationale for recommendations: Pt would benefit from continued skilled rehab to maximize safety and independence with mobility within precautions. Pt declining rehab stay therefore would recommend 24hr supervision, HHPT/OT, and FWW use.       Entered by: Ginette Peng 05/18/2020 4:19 PM

## 2020-05-18 NOTE — DISCHARGE INSTRUCTIONS
Discharge RN: Please fax discharge orders and summary to [facility] (Fax: [phone])    Hospital Follow Up appointment at [facility] ([phone]) on Wednesday, May 27, 2020 at 10:30am. Their office is not open on Mondays or Fridays in May, if you need to call the office.       [facility]      AFTER YOU GO HOME FROM YOUR HEART SURGERY  (3 vessel coronary artery bypass on 5/8/20 with Dr Yobany Holloway)    You had a sternotomy, avoid lifting anything greater than ten pounds for 6 weeks after surgery and then less than 20 pounds for an additional 6 weeks. Do not reach backwards or use arms to push out of chair. Do not let people pull on your arms to assist with standing. Avoid twisting or reaching too far across your body.  Avoid strenuous activities such as bowling, vacuuming, raking, shoveling, golf or tennis for 12 weeks after your surgery. It is okay to resume sex if you feel comfortable in doing so. You may have to try different positions with your partner.  Splint your chest incision by hugging a pillow or bringing your arms across your chest when coughing or sneezing.     No driving for 4 weeks after surgery or while on pain medication.     Shower or wash your incisions daily with soap and water (or as instructed), pat dry. Keep wound clean and dry, showers are okay after discharge, but don't let spray hit directly on incision. No baths or swimming for 1 month. Cover chest tube sites with gauze until they stop draining, then leave open to air. It is not abnormal for chest tube sites to drain yellowish/clear fluid for up to 2-3 weeks after surgery.   Watch for signs of infection: increased redness, tenderness, warmth or any drainage that appears infected (pus like) or is persistent.  Also a temperature > 100.5 F or chills. Call your surgeon or primary care provider's office immediately. Remove any skin glue left on incisions after 10-14 days. This will  not affect your incision and can speed up healing.    Exercise is very important in your recovery. Please follow the guidelines set up for you in your cardiac rehab classes at the hospital. If outpatient cardiac rehab was ordered for you, we highly recommend you participate. If you have problems arranging your cardiac rehab, please call [phone] for all locations, with the exception of [place], please call [phone] and [place], please call [phone].    Avoid sitting for prolonged periods of time, try to walk every hour during the day. If you have a leg incision, elevate your leg often when you are not walking.    Check your weight when you get home from the hospital and continue to check it daily through your recovery for at least a month. If you notice a weight gain of 2-3 pounds in a week, notify your primary care physician, cardiologist or surgeon.    Bowel activity may be slow after surgery. If necessary, you may take an over the counter laxative such as Milk of Magnesia or Miralax. You may have stool softeners prescribed (docusate sodium, Senokot). We recommend using stool softeners while using narcotics for pain (oxycodone/percocet, hydrocodone/vicodin, hydromorphone/dilaudid).      Wean OFF of narcotics (oxycodone, dilaudid, hydrocodone) as soon as possible. You should continue taking acetaminophen as long as you have any surgical pain as the first choice for pain control and add narcotics as necessary for pain to be tolerable.      DO NOT SMOKE.  IF YOU NEED HELP QUITTING, PLEASE TALK WITH YOUR CARDIOLOGIST OR PRIMARY DOCTOR.    You are on a blood thinner (warfarin, coumadin, jantuven), follow the instructions you were given in the hospital and DO NOT SKIP this medication. Try and take it the same time everyday. Your primary care physician or coumadin clinic will manage the dosing. INR goal is 2-3 for atrial fibrillation, ok to restart Eliquis 3 months after surgery.      REGARDING  PRESCRIPTION REFILLS.  If you need a refill on your pain medication contact us to discuss your pain and a possible one time refill.   All other medications will be adjusted, discontinued and re-filled by your primary care physician and/or your cardiologist as they were prior to your surgery. We have given you enough for one to three month with possibly one refill.    POST-OPERATIVE CLINIC VISITS  You have a follow up visit with [facility] Advance Care Practitioners on 5/26/20 at 2 pm for a Telemedicine Video Visit.  You will then return to the care of your primary provider and your cardiologist. Future medication refills should come from your PCP or Cardiologist.    You should see your primary care provider on Wed 5/20 for INR check.    It is important to see your [facility] Cardiologist team about 2 weeks after discharge. You need to follow up with the [facility] in 1-2 weeks, [phone].    If you do not hear from a  in 7 days, please call [phone] (choose option 1) and request to be seen with a general cardiologist or someone that you have seen in the past.   If there is a need to return to see CT Surgery please call our  at [phone].    SURGICAL QUESTIONS  Please call Evelia Fox or Ann Marie Tolentino with surgical recovery and medication questions, the phone number is listed below.  They can assist you with your needs and contact other surgery care team members as indicated.    On weekends or after hours, please call [phone] and ask the  to   page the Cardiothoracic Surgery fellow on call.      Thank you,    Your Cardiothoracic Surgery Team  Evelia Fox RN Care Coordinator-  [phone]   Ann Marie Tolentino RN Care Coordinator-  [phone]  Kayli Kebede PA-C

## 2020-05-18 NOTE — PLAN OF CARE
OT/CR 6C: Discharge Planner OT   Patient plan for discharge: Home with family assist and OP CR  Current status: Pt continues to require cues for compliance with post surgical precautions.  Min A for bed mobility, CGA for sit to stand transfers, able to ambulate approx 350ft with FWW - limited by fatigue, mod A for LE dressing without AE.  Barriers to return to prior living situation: assist required for ADLs, post surgical precautions, deconditioning, acute medical needs  Recommendations for discharge: While pt would benefit from TCU stay as pt requires assist for dressing, bathing and bed mobility and is ambulating with FWW which is below pt baseline; pt is declining - therefore, pt to discharge to home with 24hr assist from family, obtain walker and shower chair, and home OT/PT progressing to OP CR as able.  Rationale for recommendations: see above         Entered by: Honey Alvarez 05/18/2020 10:29 AM

## 2020-05-19 ENCOUNTER — PATIENT OUTREACH (OUTPATIENT)
Dept: CARE COORDINATION | Facility: CLINIC | Age: 70
End: 2020-05-19

## 2020-05-19 ENCOUNTER — APPOINTMENT (OUTPATIENT)
Dept: OCCUPATIONAL THERAPY | Facility: CLINIC | Age: 70
DRG: 234 | End: 2020-05-19
Attending: THORACIC SURGERY (CARDIOTHORACIC VASCULAR SURGERY)
Payer: MEDICARE

## 2020-05-19 VITALS
DIASTOLIC BLOOD PRESSURE: 61 MMHG | BODY MASS INDEX: 38.47 KG/M2 | WEIGHT: 268.7 LBS | OXYGEN SATURATION: 94 % | TEMPERATURE: 98.5 F | HEIGHT: 70 IN | RESPIRATION RATE: 18 BRPM | SYSTOLIC BLOOD PRESSURE: 96 MMHG | HEART RATE: 85 BPM

## 2020-05-19 LAB
ANION GAP SERPL CALCULATED.3IONS-SCNC: 3 MMOL/L (ref 3–14)
BACTERIA SPEC CULT: ABNORMAL
BACTERIA SPEC CULT: ABNORMAL
BASOPHILS # BLD AUTO: 0.1 10E9/L (ref 0–0.2)
BASOPHILS NFR BLD AUTO: 0.4 %
BUN SERPL-MCNC: 20 MG/DL (ref 7–30)
CALCIUM SERPL-MCNC: 8.8 MG/DL (ref 8.5–10.1)
CHLORIDE SERPL-SCNC: 99 MMOL/L (ref 94–109)
CO2 SERPL-SCNC: 32 MMOL/L (ref 20–32)
CREAT SERPL-MCNC: 0.79 MG/DL (ref 0.66–1.25)
CRP SERPL-MCNC: 44 MG/L (ref 0–8)
DIFFERENTIAL METHOD BLD: ABNORMAL
EOSINOPHIL # BLD AUTO: 1.1 10E9/L (ref 0–0.7)
EOSINOPHIL NFR BLD AUTO: 8.7 %
ERYTHROCYTE [DISTWIDTH] IN BLOOD BY AUTOMATED COUNT: 15.2 % (ref 10–15)
GFR SERPL CREATININE-BSD FRML MDRD: >90 ML/MIN/{1.73_M2}
GLUCOSE SERPL-MCNC: 102 MG/DL (ref 70–99)
HCT VFR BLD AUTO: 32.9 % (ref 40–53)
HGB BLD-MCNC: 9.6 G/DL (ref 13.3–17.7)
IMM GRANULOCYTES # BLD: 0.3 10E9/L (ref 0–0.4)
IMM GRANULOCYTES NFR BLD: 2.5 %
INR PPP: 2.29 (ref 0.86–1.14)
LMWH PPP CHRO-ACNC: 0.15 IU/ML
LYMPHOCYTES # BLD AUTO: 1 10E9/L (ref 0.8–5.3)
LYMPHOCYTES NFR BLD AUTO: 8 %
MCH RBC QN AUTO: 28 PG (ref 26.5–33)
MCHC RBC AUTO-ENTMCNC: 29.2 G/DL (ref 31.5–36.5)
MCV RBC AUTO: 96 FL (ref 78–100)
MONOCYTES # BLD AUTO: 1.2 10E9/L (ref 0–1.3)
MONOCYTES NFR BLD AUTO: 9.7 %
NEUTROPHILS # BLD AUTO: 9 10E9/L (ref 1.6–8.3)
NEUTROPHILS NFR BLD AUTO: 70.7 %
PLATELET # BLD AUTO: 263 10E9/L (ref 150–450)
POTASSIUM SERPL-SCNC: 5.1 MMOL/L (ref 3.4–5.3)
RBC # BLD AUTO: 3.43 10E12/L (ref 4.4–5.9)
SODIUM SERPL-SCNC: 134 MMOL/L (ref 133–144)
SPECIMEN SOURCE: ABNORMAL
WBC # BLD AUTO: 13.8 10E9/L (ref 4–11)

## 2020-05-19 PROCEDURE — 85520 HEPARIN ASSAY: CPT | Performed by: PHYSICIAN ASSISTANT

## 2020-05-19 PROCEDURE — 85027 COMPLETE CBC AUTOMATED: CPT | Performed by: PHYSICIAN ASSISTANT

## 2020-05-19 PROCEDURE — 25000132 ZZH RX MED GY IP 250 OP 250 PS 637: Mod: GY | Performed by: STUDENT IN AN ORGANIZED HEALTH CARE EDUCATION/TRAINING PROGRAM

## 2020-05-19 PROCEDURE — 86140 C-REACTIVE PROTEIN: CPT | Performed by: PHYSICIAN ASSISTANT

## 2020-05-19 PROCEDURE — 25000132 ZZH RX MED GY IP 250 OP 250 PS 637: Mod: GY | Performed by: PHYSICIAN ASSISTANT

## 2020-05-19 PROCEDURE — 85004 AUTOMATED DIFF WBC COUNT: CPT | Performed by: PHYSICIAN ASSISTANT

## 2020-05-19 PROCEDURE — 36592 COLLECT BLOOD FROM PICC: CPT | Performed by: PHYSICIAN ASSISTANT

## 2020-05-19 PROCEDURE — 85610 PROTHROMBIN TIME: CPT | Performed by: PHYSICIAN ASSISTANT

## 2020-05-19 PROCEDURE — 25000132 ZZH RX MED GY IP 250 OP 250 PS 637: Mod: GY

## 2020-05-19 PROCEDURE — 25000128 H RX IP 250 OP 636: Performed by: PHYSICIAN ASSISTANT

## 2020-05-19 PROCEDURE — 97535 SELF CARE MNGMENT TRAINING: CPT | Mod: GO | Performed by: OCCUPATIONAL THERAPIST

## 2020-05-19 PROCEDURE — 80048 BASIC METABOLIC PNL TOTAL CA: CPT | Performed by: PHYSICIAN ASSISTANT

## 2020-05-19 RX ORDER — DIGOXIN 125 MCG
125 TABLET ORAL DAILY
Qty: 45 TABLET | Refills: 0 | Status: SHIPPED | OUTPATIENT
Start: 2020-05-20

## 2020-05-19 RX ORDER — PANTOPRAZOLE SODIUM 40 MG/1
40 TABLET, DELAYED RELEASE ORAL
Qty: 14 TABLET | Refills: 0 | Status: SHIPPED | OUTPATIENT
Start: 2020-05-20

## 2020-05-19 RX ORDER — ATORVASTATIN CALCIUM 40 MG/1
40 TABLET, FILM COATED ORAL AT BEDTIME
Qty: 60 TABLET | Refills: 0 | Status: SHIPPED | OUTPATIENT
Start: 2020-05-19

## 2020-05-19 RX ORDER — CARVEDILOL 6.25 MG/1
6.25 TABLET ORAL 2 TIMES DAILY WITH MEALS
Qty: 60 TABLET | Refills: 0 | Status: SHIPPED | OUTPATIENT
Start: 2020-05-19

## 2020-05-19 RX ORDER — WARFARIN SODIUM 2.5 MG/1
TABLET ORAL
Qty: 100 TABLET | Refills: 0 | Status: SHIPPED | OUTPATIENT
Start: 2020-05-19

## 2020-05-19 RX ORDER — FUROSEMIDE 40 MG
40 TABLET ORAL
Qty: 60 TABLET | Refills: 0 | Status: SHIPPED | OUTPATIENT
Start: 2020-05-19

## 2020-05-19 RX ORDER — WARFARIN SODIUM 7.5 MG/1
7.5 TABLET ORAL
Status: DISCONTINUED | OUTPATIENT
Start: 2020-05-19 | End: 2020-05-19 | Stop reason: HOSPADM

## 2020-05-19 RX ORDER — ACETAMINOPHEN 325 MG/1
650 TABLET ORAL EVERY 6 HOURS PRN
Qty: 1 BOTTLE | Refills: 0 | Status: SHIPPED | OUTPATIENT
Start: 2020-05-19

## 2020-05-19 RX ORDER — LOSARTAN POTASSIUM 25 MG/1
75 TABLET ORAL DAILY
Qty: 90 TABLET | Refills: 0 | Status: SHIPPED | OUTPATIENT
Start: 2020-05-20

## 2020-05-19 RX ADMIN — HEPARIN SODIUM 1800 UNITS/HR: 10000 INJECTION, SOLUTION INTRAVENOUS at 04:33

## 2020-05-19 RX ADMIN — CARVEDILOL 6.25 MG: 6.25 TABLET, FILM COATED ORAL at 07:51

## 2020-05-19 RX ADMIN — AMOXICILLIN AND CLAVULANATE POTASSIUM 1 TABLET: 875; 125 TABLET, FILM COATED ORAL at 07:51

## 2020-05-19 RX ADMIN — LOSARTAN POTASSIUM 75 MG: 25 TABLET, FILM COATED ORAL at 07:50

## 2020-05-19 RX ADMIN — PANTOPRAZOLE SODIUM 40 MG: 40 TABLET, DELAYED RELEASE ORAL at 07:51

## 2020-05-19 RX ADMIN — FUROSEMIDE 40 MG: 40 TABLET ORAL at 07:51

## 2020-05-19 RX ADMIN — ASPIRIN 81 MG: 81 TABLET, COATED ORAL at 07:51

## 2020-05-19 RX ADMIN — DIGOXIN 125 MCG: 0.06 TABLET ORAL at 07:51

## 2020-05-19 ASSESSMENT — ACTIVITIES OF DAILY LIVING (ADL)
ADLS_ACUITY_SCORE: 14

## 2020-05-19 ASSESSMENT — MIFFLIN-ST. JEOR: SCORE: 1995.07

## 2020-05-19 NOTE — PROGRESS NOTES
Patient has clinic visit within 24-48 hours of discharge so no post DC follow up call is needed    5/20/2020 at 8:45am with Dr. Hima Ag for INR lab draw, Warfarin monitoring, post hospitalization follow up.      Molly Doyle CMA   Post Hospital Discharge Team

## 2020-05-19 NOTE — PLAN OF CARE
Occupational Therapy Discharge Summary    Reason for therapy discharge:    Discharged to home with home therapy.    Progress towards therapy goal(s). See goals on Care Plan in [facility] electronic health record for goal details.  Goals partially met.  Barriers to achieving goals:   discharge from facility.    Therapy recommendation(s):    Continued therapy is recommended.  Rationale/Recommendations:  Recommend continued skilled OT/PT intervention to progress pt activity tolerance, safety and independence with mobility and self cares.

## 2020-05-19 NOTE — PLAN OF CARE
Status: Patient admitted from OSH, CABG x3 on 5/8.   VS: VSS on RA. SBPs soft 80-100s. MAPs >65, asymptomatic.   Neuros: A&Ox4.   Cardiovascular: A fib 90-100s.   GI/: Tolerating 2 g Na+ diet. Denies nausea. BM x1. Voiding spontaneously.   IV: R PIV infusing w/ Heparin gtt @ 1800 units/hr. R PICC SL.   Activity: Up w/ SBA and walker.  Pain: Denies.  Respiratory/Trach: Dyspnea on exertion.   Skin: Midline sternal incision w/ liquid bandage.   Plan of Care: Discharge home today pending therapeutic INR. Continue to monitor and follow POC.

## 2020-05-19 NOTE — PLAN OF CARE
OT/CR 6C: Discharge Planner OT   Patient plan for discharge: home to his brother's house with assist and home OT/PT  Current status: Reviewed discharge recommendations with pt, assisted with obtain recommended adaptive equipment for safe mobility and self cares.    Barriers to return to prior living situation: pt lives alone, deconditioning, sternal precautions, impaired cognition  Recommendations for discharge: TCU  Rationale for recommendations: While pt would benefit from TCU at discharge, pt is declining, therefore recommend family assist, use of walker and shower chair, assist with all medication management and home OT/PT progressing to OP CR as able.         Entered by: Honey Alvarez 05/19/2020 10:22 AM

## 2020-05-19 NOTE — PROGRESS NOTES
Shift: 0700 - 1100  VS: Temp: 98.5  F (36.9  C) Temp src: Oral BP: 96/61   Heart Rate: 107 Resp: 18 SpO2: 94 % O2 Device: None (Room air)    Pain: Denies pain.   Neuro: A&Ox4, flat affect, cooperative with care. Calls appropriately.  Cardiac:   A-fib, rate controlled. Asymptomatic.   Respiratory: Lung sounds clear/diminished on RA.   GI/Diet/Appetite: 2gm Na diet with good appetite. LBM 5/18.   :  Voiding w/o difficulty.   LDA's: PICC and PIV to RUE removed prior to discharge. Vaseline gauze dressing applied to PICC line site upon removal. Pressure dressing applied to PIV site upon removal .   Skin: Midline chest incision is intact with liquid bandage, no drainage. CT sites to upper abdomen cleansed with wound cleanser and dressing applied to protect incisions from friction, otherwise no drainage from these sites.   Activity: SBA with walker.   Tests/Procedures:   Pertinent Labs/Lab Collection:      Plan: Patient discharged home today with his brother. Discharge orders were reviewed and questions and concerns were addressed. Walker was sent with the patient as well. Patient had a shower prior to discharge and all belongings were sent with the patient. Wound care supplies sent with patient and care instructions given. Patient did report that he has family members who will assist with care at home. Discharge meds picked up at pharmacy upon discharge.

## 2020-05-19 NOTE — PLAN OF CARE
Physical Therapy Discharge Summary    Reason for therapy discharge:    Discharged to home with home therapy.    Progress towards therapy goal(s). See goals on Care Plan in [facility] electronic health record for goal details.  Goals partially met.  Barriers to achieving goals:   discharge from facility.    Therapy recommendation(s):    Continued therapy is recommended.  Rationale/Recommendations:  TCU recommended though pt declining therefore 24/7 supervision and HHPT/OT recommended, FWW issued.

## 2020-05-21 ENCOUNTER — CARE COORDINATION (OUTPATIENT)
Dept: CARDIOLOGY | Facility: CLINIC | Age: 70
End: 2020-05-21

## 2020-05-21 LAB
BACTERIA SPEC CULT: NO GROWTH
BACTERIA SPEC CULT: NO GROWTH
SPECIMEN SOURCE: NORMAL
SPECIMEN SOURCE: NORMAL

## 2020-05-21 NOTE — PROGRESS NOTES
HOW ARE YOU DOING  Tell me how you have been doing since you were discharged from the hospital?  Patient states he is doing well, feels good.     ACTIVITY  How is your activity tolerance? Patient is independent with his personal cares and is walking inside and outside.     POST OP MONITORING  How is your pain on a 0-10 scale, how are you managing your pain? Patient states he has no pain and is not taking any pain meds including Tylenol.  He might have to take Tylenol tonight as he was letting the dog in and out today.     Are you still doing sternal precautions? Yes   Do you hear any clicking when you are moving or taking a deep breath?  No    Are you weighing yourself daily? No, Instructed patient on daily weights every am when he gets up and to call if weight goes up as he may be retaining fluid.  Patient verbalized understanding.      SIGNS AND SYMPTOMS OF INFECTION  1. INCREASE IN PAIN No  2. FEVER No  3. DRAINAGE No   If Yes, color:                 5. REDNESS No   6. SWELLING No    Patient states incisions are closed and has no drainage    ASSISTANCE   Do you have someone at home to assist you with your daily activities?  Patient's sister in law Allison and the patient's brother.       MEDICATIONS  Is someone helping you to set up your medications?  Allison   Do you have any questions about your medications?  No     Are you on a blood thinner?  Yes   Who is managing your INRs?  Home care has been drawing INRs, so far they are therapeutic.     FOLLOW UP  Are you scheduled for cardiac rehab? Patient is getting home care physical therapy.     You are scheduled to see our surgery advanced practice provider for post operative follow up on, video visit on 05/29.    You are scheduled to see your cardiologist on 05/27.  You are scheduled to see your primary care physician, patient saw PCP on 05/21.         CONTACT INFORMATION  Please feel free to call us with any other questions or symptoms that are concerning for you at  [phone], if it is after 4:30 in the afternoon, or a weekend please call [phone] and ask for the on call specialist.  We want to do everything we can to help prevent you needing to return to the ED, so please do not hesitate to call us.

## 2020-05-29 ENCOUNTER — VIRTUAL VISIT (OUTPATIENT)
Dept: CARDIOLOGY | Facility: CLINIC | Age: 70
End: 2020-05-29
Attending: THORACIC SURGERY (CARDIOTHORACIC VASCULAR SURGERY)
Payer: MEDICARE

## 2020-05-29 VITALS
DIASTOLIC BLOOD PRESSURE: 60 MMHG | WEIGHT: 281 LBS | SYSTOLIC BLOOD PRESSURE: 108 MMHG | BODY MASS INDEX: 40.32 KG/M2 | HEART RATE: 100 BPM

## 2020-05-29 DIAGNOSIS — Z95.1 S/P CABG (CORONARY ARTERY BYPASS GRAFT): Primary | ICD-10-CM

## 2020-05-29 ASSESSMENT — PAIN SCALES - GENERAL: PAINLEVEL: NO PAIN (0)

## 2020-05-29 NOTE — LETTER
Date:June 5, 2020      Patient was self referred, no letter generated. Do not send.        [facility] Information

## 2020-05-29 NOTE — LETTER
"5/29/2020      RE: Rudi Schilling  [address] [place] [id number]       Dear Colleague,    Thank you for the opportunity to participate in the care of your patient, Rudi Schilling, at the [facility]. Please see a copy of my visit note below.    Rudi Schilling is a 70 year old male who is being evaluated via a billable video visit.      The patient has been notified of following:     \"This video visit will be conducted via a call between you and your physician/provider. We have found that certain health care needs can be provided without the need for an in-person physical exam.  This service lets us provide the care you need with a video conversation.  If a prescription is necessary we can send it directly to your pharmacy.  If lab work is needed we can place an order for that and you can then stop by our lab to have the test done at a later time.    Video visits are billed at different rates depending on your insurance coverage.  Please reach out to your insurance provider with any questions.    If during the course of the call the physician/provider feels a video visit is not appropriate, you will not be charged for this service.\"    Patient has given verbal consent for Video visit? Yes    How would you like to obtain your AVS? Mail a copy    Patient would like the video invitation sent by: Text to cell phone: [phone]    Will anyone else be joining your video visit? No            Patient's visit had to be changed to telephone visit due to technically difficulties.     Rudi Schilling is a 70 year old male who is being evaluated via a billable telephone visit.      The patient has been notified of following:     \"This telephone visit will be conducted via a call between you and your physician/provider. We have found that certain health care needs can be provided without the need for a physical exam.  This service lets us provide the care you " "need with a short phone conversation.  If a prescription is necessary we can send it directly to your pharmacy.  If lab work is needed we can place an order for that and you can then stop by our lab to have the test done at a later time.    Telephone visits are billed at different rates depending on your insurance coverage. During this emergency period, for some insurers they may be billed the same as an in-person visit.  Please reach out to your insurance provider with any questions.    If during the course of the call the physician/provider feels a telephone visit is not appropriate, you will not be charged for this service.\"    Patient has given verbal consent for Telephone visit?  Yes      Phone call duration: 20 minutes    Kayli Serna PA-C      Reason for visit: Post-Op CABG x 3 on 5/8/2020 by Dr. Holloway     HPI: Rudi Schilling is a 70 year old male seen in clinic for follow-up appointment after surgery. Patient has past medical history of CAD, a-fib, heart failure with EF of 35-40%. Hospital course was remarkable for a-fib and sternal drainage.    Patient has been doing well since discharge and is now being seen for post op video visit.     Patient reports incision is healing well. He denies any current drainage. Patient denies any fever, chills, chest pain, palpitations, edema, SOB, lightheadedness, nausea and vomiting.  Normal bowel movements.  Voiding without problems.    He will be attending cardiac rehab.     Patient is on Coumadin and Coumadin is therapeutic.  Weight  stable .  Blood glucose levels have been under good control.  Patient reports seeing his cardiologist by video visit and his coreg was increased.     PAST MEDICAL HISTORY:  No past medical history on file.    PAST SURGICAL HISTORY:  Past Surgical History:   Procedure Laterality Date     BYPASS GRAFT ARTERY CORONARY N/A 5/8/2020    Procedure: Median sternotomy.  Coronary artery bypass grafts x3 using harvested left internal " mammary artery and left endoscopically harvested greater saphenous vein. Bilateral  Pulmonary vein isolation using atricure.  Left atrial appendage ligation using Atricure clip size 45.  Cardiopulmonary bypass.  Intraoperative transesophageal echocardiogram per anesthsia;  Surgeon: Yobany Holloway,      CV INTRA-AORTIC BALLOON PUMP INSERTION N/A 5/7/2020    Procedure: Intra-Aortic Balloon Pump Insertion;  Surgeon: Sebastian Heck MD;  Location:  HEART CARDIAC CATH LAB     CV RIGHT HEART CATH N/A 5/7/2020    Procedure: Right Heart Cath;  Surgeon: Sebastian Heck MD;  Location: [facility] CARDIAC CATH LAB     CV SWAN DAVY PROCEDURE N/A 5/7/2020    Procedure: [place] Davy Procedure;  Surgeon: Sebastian Heck MD;  Location: [facility] CARDIAC CATH LAB     PICC DOUBLE LUMEN PLACEMENT Right 05/10/2020    Right basilic, 53 cm       CURRENT MEDICATIONS:   Current Outpatient Medications:      acetaminophen (TYLENOL) 325 MG tablet, Take 2 tablets (650 mg) by mouth every 6 hours as needed for mild pain or fever, Disp: 1 Bottle, Rfl: 0     aspirin (ASA) 81 MG EC tablet, Take 1 tablet (81 mg) by mouth daily, Disp: 120 tablet, Rfl: 0     atorvastatin (LIPITOR) 40 MG tablet, Take 1 tablet (40 mg) by mouth At Bedtime, Disp: 60 tablet, Rfl: 0     carvedilol (COREG) 6.25 MG tablet, Take 1 tablet (6.25 mg) by mouth 2 times daily (with meals), Disp: 60 tablet, Rfl: 0     digoxin (LANOXIN) 125 MCG tablet, Take 1 tablet (125 mcg) by mouth daily, Disp: 45 tablet, Rfl: 0     furosemide (LASIX) 40 MG tablet, Take 1 tablet (40 mg) by mouth 2 times daily, Disp: 60 tablet, Rfl: 0     losartan (COZAAR) 25 MG tablet, Take 3 tablets (75 mg) by mouth daily, Disp: 90 tablet, Rfl: 0     pantoprazole (PROTONIX) 40 MG EC tablet, Take 1 tablet (40 mg) by mouth every morning (before breakfast) then stop, Disp: 14 tablet, Rfl: 0     warfarin ANTICOAGULANT (COUMADIN) 2.5 MG tablet, Take 7.5 mg (3 tabs) PO daily at 6 pm until next INR draw, then dose  per INR goal 2-3, Disp: 100 tablet, Rfl: 0    ALLERGIES:  No Known Allergies      LABS:  CBC RESULTS:  Lab Results   Component Value Date    WBC 13.8 (H) 05/19/2020    RBC 3.43 (L) 05/19/2020    HGB 9.6 (L) 05/19/2020    HCT 32.9 (L) 05/19/2020    MCV 96 05/19/2020    MCH 28.0 05/19/2020    MCHC 29.2 (L) 05/19/2020    RDW 15.2 (H) 05/19/2020     05/19/2020       BMP RESULTS:  Lab Results   Component Value Date     05/19/2020    POTASSIUM 5.1 05/19/2020    CHLORIDE 99 05/19/2020    CO2 32 05/19/2020    ANIONGAP 3 05/19/2020     (H) 05/19/2020    BUN 20 05/19/2020    CR 0.79 05/19/2020    GFRESTIMATED >90 05/19/2020    GFRESTBLACK >90 05/19/2020    SIDRA 8.8 05/19/2020        INR RESULTS:  Lab Results   Component Value Date    INR 2.29 (H) 05/19/2020     ASSESSMENT/PLAN:  Rudi is a 70 year old male  Status post CABG seen for post op video visit.     1. Patient doing well. Patient has no concerns today. Patient reports healing well surgically.   2. No further visits needed with CV surgery. Continue to follow with primary and cardiologist.        Approximately 20 minutes spent with the patient at telephone visit today.     CC  Patient Care Team:  No Ref-Primary, Physician as PCP - General  SELF, REFERRED    Please do not hesitate to contact me if you have any questions/concerns.     Sincerely,     Cardiovascular Thoracic Surgery

## 2020-05-29 NOTE — PROGRESS NOTES
"Rudi Schilling is a 70 year old male who is being evaluated via a billable video visit.      The patient has been notified of following:     \"This video visit will be conducted via a call between you and your physician/provider. We have found that certain health care needs can be provided without the need for an in-person physical exam.  This service lets us provide the care you need with a video conversation.  If a prescription is necessary we can send it directly to your pharmacy.  If lab work is needed we can place an order for that and you can then stop by our lab to have the test done at a later time.    Video visits are billed at different rates depending on your insurance coverage.  Please reach out to your insurance provider with any questions.    If during the course of the call the physician/provider feels a video visit is not appropriate, you will not be charged for this service.\"    Patient has given verbal consent for Video visit? Yes    How would you like to obtain your AVS? Mail a copy    Patient would like the video invitation sent by: Text to cell phone: [phone]    Will anyone else be joining your video visit? No          "

## 2020-05-29 NOTE — PROGRESS NOTES
"Patient's visit had to be changed to telephone visit due to technically difficulties.     Rudi Schilling is a 70 year old male who is being evaluated via a billable telephone visit.      The patient has been notified of following:     \"This telephone visit will be conducted via a call between you and your physician/provider. We have found that certain health care needs can be provided without the need for a physical exam.  This service lets us provide the care you need with a short phone conversation.  If a prescription is necessary we can send it directly to your pharmacy.  If lab work is needed we can place an order for that and you can then stop by our lab to have the test done at a later time.    Telephone visits are billed at different rates depending on your insurance coverage. During this emergency period, for some insurers they may be billed the same as an in-person visit.  Please reach out to your insurance provider with any questions.    If during the course of the call the physician/provider feels a telephone visit is not appropriate, you will not be charged for this service.\"    Patient has given verbal consent for Telephone visit?  Yes      Phone call duration: 20 minutes    Kayli Serna PA-C      Reason for visit: Post-Op CABG x 3 on 5/8/2020 by Dr. Holloway     HPI: Rudi Schilling is a 70 year old male seen in clinic for follow-up appointment after surgery. Patient has past medical history of CAD, a-fib, heart failure with EF of 35-40%. Hospital course was remarkable for a-fib and sternal drainage.    Patient has been doing well since discharge and is now being seen for post op video visit.     Patient reports incision is healing well. He denies any current drainage. Patient denies any fever, chills, chest pain, palpitations, edema, SOB, lightheadedness, nausea and vomiting.  Normal bowel movements.  Voiding without problems.    He will be attending cardiac rehab.     Patient is on " Coumadin and Coumadin is therapeutic.  Weight  stable .  Blood glucose levels have been under good control.  Patient reports seeing his cardiologist by video visit and his coreg was increased.     PAST MEDICAL HISTORY:  No past medical history on file.    PAST SURGICAL HISTORY:  Past Surgical History:   Procedure Laterality Date     BYPASS GRAFT ARTERY CORONARY N/A 5/8/2020    Procedure: Median sternotomy.  Coronary artery bypass grafts x3 using harvested left internal mammary artery and left endoscopically harvested greater saphenous vein. Bilateral  Pulmonary vein isolation using atricure.  Left atrial appendage ligation using Atricure clip size 45.  Cardiopulmonary bypass.  Intraoperative transesophageal echocardiogram per anesthsia;  Surgeon: Yobany Holloway,      CV INTRA-AORTIC BALLOON PUMP INSERTION N/A 5/7/2020    Procedure: Intra-Aortic Balloon Pump Insertion;  Surgeon: Sebastian Heck MD;  Location:  HEART CARDIAC CATH LAB     CV RIGHT HEART CATH N/A 5/7/2020    Procedure: Right Heart Cath;  Surgeon: Sebastian Heck MD;  Location:  HEART CARDIAC CATH LAB     CV SWAN DAVY PROCEDURE N/A 5/7/2020    Procedure: [place] Davy Procedure;  Surgeon: Sebastian Heck MD;  Location:  HEART CARDIAC CATH LAB     PICC DOUBLE LUMEN PLACEMENT Right 05/10/2020    Right basilic, 53 cm       CURRENT MEDICATIONS:   Current Outpatient Medications:      acetaminophen (TYLENOL) 325 MG tablet, Take 2 tablets (650 mg) by mouth every 6 hours as needed for mild pain or fever, Disp: 1 Bottle, Rfl: 0     aspirin (ASA) 81 MG EC tablet, Take 1 tablet (81 mg) by mouth daily, Disp: 120 tablet, Rfl: 0     atorvastatin (LIPITOR) 40 MG tablet, Take 1 tablet (40 mg) by mouth At Bedtime, Disp: 60 tablet, Rfl: 0     carvedilol (COREG) 6.25 MG tablet, Take 1 tablet (6.25 mg) by mouth 2 times daily (with meals), Disp: 60 tablet, Rfl: 0     digoxin (LANOXIN) 125 MCG tablet, Take 1 tablet (125 mcg) by mouth daily, Disp: 45 tablet, Rfl:  0     furosemide (LASIX) 40 MG tablet, Take 1 tablet (40 mg) by mouth 2 times daily, Disp: 60 tablet, Rfl: 0     losartan (COZAAR) 25 MG tablet, Take 3 tablets (75 mg) by mouth daily, Disp: 90 tablet, Rfl: 0     pantoprazole (PROTONIX) 40 MG EC tablet, Take 1 tablet (40 mg) by mouth every morning (before breakfast) then stop, Disp: 14 tablet, Rfl: 0     warfarin ANTICOAGULANT (COUMADIN) 2.5 MG tablet, Take 7.5 mg (3 tabs) PO daily at 6 pm until next INR draw, then dose per INR goal 2-3, Disp: 100 tablet, Rfl: 0    ALLERGIES:  No Known Allergies      LABS:  CBC RESULTS:  Lab Results   Component Value Date    WBC 13.8 (H) 05/19/2020    RBC 3.43 (L) 05/19/2020    HGB 9.6 (L) 05/19/2020    HCT 32.9 (L) 05/19/2020    MCV 96 05/19/2020    MCH 28.0 05/19/2020    MCHC 29.2 (L) 05/19/2020    RDW 15.2 (H) 05/19/2020     05/19/2020       BMP RESULTS:  Lab Results   Component Value Date     05/19/2020    POTASSIUM 5.1 05/19/2020    CHLORIDE 99 05/19/2020    CO2 32 05/19/2020    ANIONGAP 3 05/19/2020     (H) 05/19/2020    BUN 20 05/19/2020    CR 0.79 05/19/2020    GFRESTIMATED >90 05/19/2020    GFRESTBLACK >90 05/19/2020    SIDRA 8.8 05/19/2020        INR RESULTS:  Lab Results   Component Value Date    INR 2.29 (H) 05/19/2020     ASSESSMENT/PLAN:  Rudi is a 70 year old male  Status post CABG seen for post op video visit.     1. Patient doing well. Patient has no concerns today. Patient reports healing well surgically.   2. No further visits needed with CV surgery. Continue to follow with primary and cardiologist.        Approximately 20 minutes spent with the patient at telephone visit today.     CC  Patient Care Team:  No Ref-Primary, Physician as PCP - General  SELF, REFERRED

## 2020-06-22 ENCOUNTER — TELEPHONE (OUTPATIENT)
Dept: CARDIOLOGY | Facility: CLINIC | Age: 70
End: 2020-06-22

## 2020-07-08 ENCOUNTER — TELEPHONE (OUTPATIENT)
Dept: OTHER | Facility: CLINIC | Age: 70
End: 2020-07-08

## (undated) DEVICE — SU PROLENE 4-0 RB-1DA 36" 8557H

## (undated) DEVICE — SU PROLENE 8-0 BV130-5DA 24" 8732H

## (undated) DEVICE — WIRE GUIDE 0.035"X150CM EMERALD J TIP 502521

## (undated) DEVICE — SUCTION CATH AIRLIFE TRI-FLO W/CONTROL PORT 14FR  T60C

## (undated) DEVICE — BLADE KNIFE BEAVER MICROSHARP GREEN 377515

## (undated) DEVICE — BLADE CLIPPER SGL USE 9680

## (undated) DEVICE — SU STEEL MYO/WIRE II STERNOTOMY 8 BE-1 3X14" 048-217

## (undated) DEVICE — TUBING SUCTION 10'X3/16" N510

## (undated) DEVICE — ESU HOLSTER PLASTIC DISP E2400

## (undated) DEVICE — PREP CHLORAPREP 26ML TINTED ORANGE  260815

## (undated) DEVICE — SYR 01ML 27GA 0.5" NDL TBC 309623

## (undated) DEVICE — WIPES FOLEY CARE SURESTEP PROVON DFC100

## (undated) DEVICE — DRSG TEGADERM 8X12" 1629

## (undated) DEVICE — DRAIN CHEST TUBE 32FR STR 8032

## (undated) DEVICE — BNDG ELASTIC 4"X5YDS STERILE 6611-4S

## (undated) DEVICE — SU PROLENE 4-0 SHDA 36" 8521H

## (undated) DEVICE — SU STEEL 6 CCS 4X18" M654G

## (undated) DEVICE — SOL NACL 0.9% IRRIG 3000ML BAG 2B7477

## (undated) DEVICE — SURGICEL POWDER ABSORBABLE HEMOSTAT 3GM 3013SP

## (undated) DEVICE — SU VICRYL 0 CTX 36" J370H

## (undated) DEVICE — ADPT 5 IN 1 360

## (undated) DEVICE — LINEN TOWEL PACK X6 WHITE 5487

## (undated) DEVICE — BLOWER/MISTER CLEARVIEW 22150

## (undated) DEVICE — LINEN TOWEL PACK X30 5481

## (undated) DEVICE — SPONGE RAY-TEC 4X8" 7318

## (undated) DEVICE — CANNULA VESSEL DLP  30001

## (undated) DEVICE — SUCTION DRY CHEST DRAIN OASIS 3600-100

## (undated) DEVICE — KIT INTRODUCER DL 9FR 11.5CM J-TIP 0.35"X45CM CDC-21242-1A

## (undated) DEVICE — CLIP HORIZON LG ORANGE 004200

## (undated) DEVICE — DRSG DRAIN 4X4" 7086

## (undated) DEVICE — PACK ADULT HEART UMMC PV15CG92D

## (undated) DEVICE — Device

## (undated) DEVICE — ADH SKIN CLOSURE PREMIERPRO EXOFIN 1.0ML 3470

## (undated) DEVICE — PREP SKIN SCRUB TRAY 4461A

## (undated) DEVICE — DRSG ABDOMINAL 07 1/2X8" 7197D

## (undated) DEVICE — INTRO SHEATH 9FRX10CM PINNACLE RSS902

## (undated) DEVICE — BLADE SAW STERNAL 20X30MM KM-32

## (undated) DEVICE — DEFIB PRO-PADZ LVP LQD GEL ADULT 8900-2105-01

## (undated) DEVICE — DRAIN CHEST TUBE RIGHT ANGLED 28FR 8128

## (undated) DEVICE — DRSG TELFA 3X8" 1238

## (undated) DEVICE — PUNCH AORTIC 5.0MM LONG AP-550

## (undated) DEVICE — CLIP HORIZON SM RED WIDE SLOT 001201

## (undated) DEVICE — CLIP HORIZON MED BLUE 002200

## (undated) DEVICE — DRSG BIOPATCH GERMICIDAL SPLIT SPONGE 4MM MED 4150

## (undated) DEVICE — TUBING INSUFFLATION W/FILTER CPC TO LUER 620-030-301

## (undated) DEVICE — TAPE MEDIPORE 4"X2YD 2864

## (undated) DEVICE — SU PROLENE 5-0 RB-1DA 36"  8556H

## (undated) DEVICE — PROTECTOR ARM ONE-STEP TRENDELENBURG 40418

## (undated) DEVICE — LEAD PACER MYOCARDIAL BIPOLAR TEMPORARY 53CM 6495F

## (undated) DEVICE — ESU ELEC BLADE 2.75" COATED/INSULATED E1455

## (undated) DEVICE — SU PROLENE 7-0 BV-1DA 4X24" M8702

## (undated) DEVICE — ESU ELEC BLADE 6" COATED E1450-6

## (undated) DEVICE — SPONGE LAP 12X12" X8425

## (undated) DEVICE — BLADE KNIFE BEAVER MINI STR BEAVER6900

## (undated) DEVICE — BNDG ELASTIC 6"X5YDS STERILE 6611-6S

## (undated) DEVICE — SU ETHIBOND 2-0 SHDA 30" X563H

## (undated) DEVICE — SU PROLENE 7-0 BV-1DA 30" 8703H

## (undated) DEVICE — PUNCH AORTIC 4.0MM LONG AP-540

## (undated) DEVICE — SURGICEL HEMOSTAT 4X8" 1952

## (undated) DEVICE — INTRO SHEATH 7FRX10CM PINNACLE RSS702

## (undated) DEVICE — KIT RIGHT HEART CATH 60130719

## (undated) DEVICE — GLOVE SENSICARE 7.5 MSG1075 LATEX FREE

## (undated) DEVICE — SU PLEDGET SOFT TFE 3/8"X3/26"X1/16" PCP40

## (undated) DEVICE — CLIP SPRING FOGARTY SOFTJAW CSOFT6

## (undated) DEVICE — DRAPE IOBAN INCISE 23X17" 6650EZ

## (undated) DEVICE — PACK HEART RIGHT CUSTOM SAN32RHF18

## (undated) DEVICE — ESU PENCIL W/COATED BLADE E2450H

## (undated) DEVICE — PREP CHLORHEXIDINE 4% 4OZ (HIBICLENS) 57504

## (undated) DEVICE — SU SILK 0 TIE 6X30" A306H

## (undated) DEVICE — SU SILK 0 SH 30" K834H

## (undated) DEVICE — KIT ENDO VASOVIEW HEMOPRO 2 VH-4000

## (undated) DEVICE — DRSG TEGADERM 2 3/8X2 3/4" 1624W

## (undated) DEVICE — ANTIFOG SOLUTION W/FOAM PAD 31142527

## (undated) DEVICE — SU PROLENE 6-0 C-1DA 4X24" M8726

## (undated) DEVICE — DRAPE SLUSH/WARMER 66X44" ORS-320

## (undated) DEVICE — SU PROLENE 6-0 C-1DA 30" 8706H

## (undated) DEVICE — SU ETHIBOND 0 CT-1 CR 8X18" CX21D

## (undated) DEVICE — SU VICRYL 3-0 FS-1 27" J442H

## (undated) DEVICE — SPECIMEN CONTAINER 5OZ STERILE 2600SA

## (undated) DEVICE — SU RETRACT-O-TAPE 1041

## (undated) DEVICE — SU ETHIBOND 3-0 BBDA 36" X588H

## (undated) DEVICE — HANDPIECE ABLATION OPEN LONG JAW LT CURVE  OLL2

## (undated) DEVICE — TIES BANDING T50R

## (undated) DEVICE — CONNECTOR DRAIN CHEST Y EXTENSION SET 19909

## (undated) DEVICE — DRSG TEGADERM 4X4 3/4" 1626W

## (undated) DEVICE — WAND SUCTION LP SOFT 15.2CM SU-22702

## (undated) DEVICE — SOL NACL 0.9% IRRIG 1000ML BOTTLE 2F7124

## (undated) RX ORDER — HYDROMORPHONE HYDROCHLORIDE 1 MG/ML
INJECTION, SOLUTION INTRAMUSCULAR; INTRAVENOUS; SUBCUTANEOUS
Status: DISPENSED
Start: 2020-05-08

## (undated) RX ORDER — LIDOCAINE HYDROCHLORIDE 20 MG/ML
INJECTION, SOLUTION EPIDURAL; INFILTRATION; INTRACAUDAL; PERINEURAL
Status: DISPENSED
Start: 2020-05-08

## (undated) RX ORDER — PROPOFOL 10 MG/ML
INJECTION, EMULSION INTRAVENOUS
Status: DISPENSED
Start: 2020-05-08

## (undated) RX ORDER — HEPARIN SODIUM 1000 [USP'U]/ML
INJECTION, SOLUTION INTRAVENOUS; SUBCUTANEOUS
Status: DISPENSED
Start: 2020-05-08

## (undated) RX ORDER — CEFAZOLIN SODIUM 1 G/3ML
INJECTION, POWDER, FOR SOLUTION INTRAMUSCULAR; INTRAVENOUS
Status: DISPENSED
Start: 2020-05-08

## (undated) RX ORDER — LIDOCAINE HYDROCHLORIDE 10 MG/ML
INJECTION, SOLUTION EPIDURAL; INFILTRATION; INTRACAUDAL; PERINEURAL
Status: DISPENSED
Start: 2020-05-10

## (undated) RX ORDER — PAPAVERINE HYDROCHLORIDE 30 MG/ML
INJECTION INTRAMUSCULAR; INTRAVENOUS
Status: DISPENSED
Start: 2020-05-08

## (undated) RX ORDER — FENTANYL CITRATE 50 UG/ML
INJECTION, SOLUTION INTRAMUSCULAR; INTRAVENOUS
Status: DISPENSED
Start: 2020-05-08

## (undated) RX ORDER — FENTANYL CITRATE 50 UG/ML
INJECTION, SOLUTION INTRAMUSCULAR; INTRAVENOUS
Status: DISPENSED
Start: 2020-05-07